# Patient Record
Sex: MALE | Race: WHITE | NOT HISPANIC OR LATINO | Employment: FULL TIME | URBAN - METROPOLITAN AREA
[De-identification: names, ages, dates, MRNs, and addresses within clinical notes are randomized per-mention and may not be internally consistent; named-entity substitution may affect disease eponyms.]

---

## 2017-01-10 ENCOUNTER — SLEEP CENTER VISIT (OUTPATIENT)
Dept: SLEEP MEDICINE | Facility: MEDICAL CENTER | Age: 47
End: 2017-01-10
Payer: COMMERCIAL

## 2017-01-10 VITALS
TEMPERATURE: 97.9 F | RESPIRATION RATE: 16 BRPM | DIASTOLIC BLOOD PRESSURE: 88 MMHG | BODY MASS INDEX: 38.19 KG/M2 | SYSTOLIC BLOOD PRESSURE: 140 MMHG | HEART RATE: 84 BPM | WEIGHT: 252 LBS | HEIGHT: 68 IN

## 2017-01-10 DIAGNOSIS — R53.82 CHRONIC FATIGUE: ICD-10-CM

## 2017-01-10 DIAGNOSIS — I48.0 PAROXYSMAL ATRIAL FIBRILLATION (HCC): ICD-10-CM

## 2017-01-10 DIAGNOSIS — I10 ESSENTIAL HYPERTENSION: ICD-10-CM

## 2017-01-10 DIAGNOSIS — Z79.01 CHRONIC ANTICOAGULATION: ICD-10-CM

## 2017-01-10 DIAGNOSIS — Z95.2 HISTORY OF MECHANICAL AORTIC VALVE REPLACEMENT: ICD-10-CM

## 2017-01-10 DIAGNOSIS — G47.33 OSA ON CPAP: Chronic | ICD-10-CM

## 2017-01-10 DIAGNOSIS — I34.2 NON-RHEUMATIC MITRAL VALVE STENOSIS: ICD-10-CM

## 2017-01-10 DIAGNOSIS — Z95.0 PRESENCE OF PERMANENT CARDIAC PACEMAKER: ICD-10-CM

## 2017-01-10 DIAGNOSIS — Z86.73 HISTORY OF TIA (TRANSIENT ISCHEMIC ATTACK): ICD-10-CM

## 2017-01-10 DIAGNOSIS — E66.9 OBESITY (BMI 35.0-39.9 WITHOUT COMORBIDITY): ICD-10-CM

## 2017-01-10 PROCEDURE — 99204 OFFICE O/P NEW MOD 45 MIN: CPT | Performed by: NURSE PRACTITIONER

## 2017-01-10 NOTE — PATIENT INSTRUCTIONS
1. We will request prior sleep study  2. Titration study ASAP  3. Follow-up after study to review results

## 2017-01-10 NOTE — MR AVS SNAPSHOT
"        Brandon Winters   1/10/2017 3:20 PM   Sleep Center Visit   MRN: [id number]    Department:  [facility]   Dept Phone:  [phone]    Description:  Male : 1970   Provider:  MARIBEL Champion           Reason for Visit     Establish Care TRIP Evaluation       Allergies as of 1/10/2017     Allergen Noted Reactions    Lactose 2016         You were diagnosed with     Chronic anticoagulation   [188394]       History of mechanical aortic valve replacement   [518494]       History of TIA (transient ischemic attack)   [732021]       Essential hypertension   [5053772]       Non-rheumatic mitral valve stenosis   [952886]       Obesity (BMI 35.0-39.9 without comorbidity) (HCC)   [515201]       TRIP on CPAP   [248654]       Paroxysmal atrial fibrillation (HCC)   [887135]       Presence of permanent cardiac pacemaker   [197995]       Chronic fatigue   [010155]         Vital Signs     Blood Pressure Pulse Temperature Respirations Height Weight    140/88 mmHg 84 36.6 °C (97.9 °F) (Temporal) 16 1.727 m (5' 8\") 114.306 kg (252 lb)    Body Mass Index Smoking Status                38.33 kg/m2 Never Smoker           Basic Information     Date Of Birth Sex Race Ethnicity Preferred Language    1970 Male White Non- English      Your appointments     2017  3:45 PM   Established Patient with [facility] EXAM 5   [facility]  (--)    [address] [address]   [phone]            2017  8:00 PM   Sleep Study with SLEEP TECH   [facility] (--)    [address] [id number] [phone]            2017  3:20 PM   Follow UP with MARIBEL Champion   [facility] (--)    [address]  [facility] [address] [phone]            2017  4:20 PM   FOLLOW UP with MARIBEL Isaacs   [facility] [place] [facility] (--)    [address], [address]  [place] [id number] [phone]              Problem List              ICD-10-CM Priority Class Noted - Resolved    Hypertension I10 Medium  Unknown - Present    History of mechanical aortic valve replacement Z95.2 Medium  Unknown - Present    Hyperlipoproteinemia E78.5 Medium  Unknown - Present    Presence of permanent cardiac pacemaker Z95.0 Medium  Unknown - Present    Chronic anticoagulation Z79.01 Medium  Unknown - Present    History of TIA (transient ischemic attack) Z86.73 Medium  Unknown - Present    TRIP on CPAP (Chronic) G47.33 Medium  Unknown - Present    Paroxysmal atrial fibrillation (HCC) I48.0 Medium  9/16/2015 - Present    Shortness of breath R06.02 Medium  9/9/2016 - Present    Generalized edema R60.1 Medium  9/9/2016 - Present    Non-rheumatic mitral valve stenosis I34.2 Medium  10/11/2016 - Present    Dyspnea on exertion R06.09   10/13/2016 - Present    Ventral hernia without obstruction or gangrene K43.9   10/13/2016 - Present    Obesity (BMI 35.0-39.9 without comorbidity) (HCC) E66.9   10/14/2016 - Present    Complications, mechanical, pacemaker, cardiac T82.111A   12/29/2016 - Present    Chronic fatigue R53.82   1/10/2017 - Present      Health Maintenance        Date Due Completion Dates    IMM DTaP/Tdap/Td Vaccine (1 - Tdap) 6/19/1990 ---    IMM INFLUENZA (1) 9/1/2016 ---            Current Immunizations     No immunizations on file.      Below and/or attached are the medications your provider expects you to take. Review all of your home medications and newly ordered medications with your provider and/or pharmacist. Follow medication instructions as directed by your provider and/or pharmacist. Please keep your medication list with you and share with your provider. Update the information when medications are discontinued, doses are changed, or new medications (including over-the-counter products) are added; and carry  medication information at all times in the event of emergency situations     Allergies:  LACTOSE - (reactions not documented)               Medications  Valid as of: January 10, 2017 -  4:01 PM    Generic Name Brand Name Tablet Size Instructions for use    Aspirin (Tablet Delayed Response) ECOTRIN 81 MG Take 1 Tab by mouth every day.        Atorvastatin Calcium (Tab) LIPITOR 20 MG Take 2 Tabs by mouth every day.        Doxycycline Hyclate (Tab) VIBRAMYCIN 100 MG Take 1 Tab by mouth 2 times a day.        Pantoprazole Sodium (Tablet Delayed Response) PROTONIX 40 MG Take 1 Tab by mouth every day.        Warfarin Sodium (Tab) COUMADIN 5 MG Take 1 Tab by mouth every day.        Warfarin Sodium (Tab) COUMADIN 2.5 MG Take 1 Tab by mouth every Monday, Wednesday, and Friday.        Warfarin Sodium (Tab) COUMADIN 5 MG Take 1.5 tablets (7.5 mg) on Mondays and Fridays and 5mg the rest of week or as directed by coumadin clinic        .                 Medicines prescribed today were sent to:     [facility] [address]    [facility] [address]    Phone: [phone] Fax: [phone]    Open 24 Hours?: No      Medication refill instructions:       If your prescription bottle indicates you have medication refills left, it is not necessary to call your provider’s office. Please contact your pharmacy and they will refill your medication.    If your prescription bottle indicates you do not have any refills left, you may request refills at any time through one of the following ways: The online [employer] system (except Urgent Care), by calling your provider’s office, or by asking your pharmacy to contact your provider’s office with a refill request. Medication refills are processed only during regular business hours and may not be available until the next business day. Your provider may request additional information or to have a follow-up visit with you prior to refilling your medication.   *Please Note:  Medication refills are assigned a new Rx number when refilled electronically. Your pharmacy may indicate that no refills were authorized even though a new prescription for the same medication is available at the pharmacy. Please request the medicine by name with the pharmacy before contacting your provider for a refill.        Your To Do List     Future Labs/Procedures Complete By Expires    POLYSOMNOGRAPHY TITRATION  As directed 1/10/2018      Instructions    1. We will request prior sleep study  2. Titration study ASAP  3. Follow-up after study to review results          [employer] Access Code: Activation code not generated  Current [employer] Status: Active

## 2017-01-11 NOTE — PROGRESS NOTES
Chief Complaint   Patient presents with   • Establish Care     TRIP Evaluation          HPI: This patient is a 46 y.o. male, who presents for evaluation and management of known sleep-disordered breathing. They moved here from [place] a couple of years ago. He has not had new mask or supplies in quite some time. Patient is currently using CPAP 10 cm H2O. His former sleep study was approximately 5 years ago through Dr. Zuñiga. We will try to obtain those records. Patient has a significant cardiac history including A. fib, permanent pacemaker, mechanical aortic valve replacement, anticoagulated with Coumadin. He is followed by Dr. Brandon Chawla cardiology. Despite using CPAP the patient complains of occasional resuscitative gasps, morning headaches, restless sleep, significant daytime fatigue. He tells me he will not off easily during quiet movements of the day and naps on his days off. ESS is 18. He is a never smoker. Denies ETOH use. He typically drinks 8 carbonated beverages per day but is trying to cut back. He denies symptoms of narcolepsy or cataplexy. He complains of symptoms of restless leg. He has gained approximately 17 pounds in the past year. He understands the importance of routine exercise and the relation of weight gain to sleep apnea. He is hoping once fatigue improves he will be able to exercise more frequently.    Past Medical History   Diagnosis Date   • Hypertension    • Hyperlipoproteinemia    • Sleep apnea      CPAP   • Anticoagulation monitoring, special range    • Paroxysmal atrial fibrillation (HCC)    • Myocardial infarct (HCC) 11/2006   • Pacemaker      AICD   • Breath shortness    • Snoring    • Stroke (HCC) 2010     TIA- no deficits   • Congestive heart failure (HCC) 2/2007     complication following heart surgery, resolved now       Social History   Substance Use Topics   • Smoking status: Never Smoker    • Smokeless tobacco: Never Used   • Alcohol Use: No      Comment: 2-3 times a  "year       Family History   Problem Relation Age of Onset   • Other Father      Hernia   • Heart Disease Father      Ascending Aortic Aneurysm Repair   • No Known Problems Mother    • No Known Problems Sister    • Cancer Paternal Uncle      Prostate Cancer   • Heart Disease Maternal Grandmother      ?Open Heart Surgery   • Dementia Maternal Grandmother    • Heart Disease Paternal Grandmother      ?Open Heart Surgery (CABG)   • Alzheimer's Disease Paternal Grandmother    • Heart Disease Paternal Grandfather      ?Aortic Aneurysm       Current medications as of today   Current Outpatient Prescriptions   Medication Sig Dispense Refill   • atorvastatin (LIPITOR) 20 MG Tab Take 2 Tabs by mouth every day. 180 Tab 3   • aspirin EC (ECOTRIN) 81 MG Tablet Delayed Response Take 1 Tab by mouth every day. 30 Tab    • warfarin (COUMADIN) 5 MG Tab Take 1 Tab by mouth every day. 100 Tab 3   • warfarin (COUMADIN) 2.5 MG Tab Take 1 Tab by mouth every Monday, Wednesday, and Friday. 100 Tab 3   • pantoprazole (PROTONIX) 40 MG Tablet Delayed Response Take 1 Tab by mouth every day. 90 Tab 3   • doxycycline (VIBRAMYCIN) 100 MG Tab Take 1 Tab by mouth 2 times a day. 10 Tab 0   • warfarin (COUMADIN) 5 MG Tab Take 1.5 tablets (7.5 mg) on Mondays and Fridays and 5mg the rest of week or as directed by coumadin clinic 35 Tab 2     No current facility-administered medications for this visit.       Allergies: Lactose    Blood pressure 140/88, pulse 84, temperature 36.6 °C (97.9 °F), temperature source Temporal, resp. rate 16, height 1.727 m (5' 8\"), weight 114.306 kg (252 lb).      ROS:   Constitutional: Denies fevers, chills, night sweats, weight loss. Positive for fatigue.  HEENT: Denies earache, difficulty hearing, tinnitus,hoarseness. Positive for nasal congestion.  Cardiovascular: Denies chest pain, tightness, palpitations, orthopnea. Positive for bilateral lower extremity edema.  Respiratory: Positive for exertional dyspnea, denies cough, " wheeze, hemoptysis.  Sleep: See HPI  GI: Denies heartburn, dysphagia, nausea, abdominal pain, diarrhea or constipation  : Denies frequent urination, hematuria, discharge or painful urination  Musculoskeletal: Positive for chronic back pain  Neurological: Denies weakness or headaches  Skin: No rashes    Physical exam:   Appearance: Well-nourished, well-developed, in no acute distress  HEENT: Normocephalic, atraumatic, white sclera, PERRLA  Respiratory: no intercostal retractions or accessory muscle use   Lungs auscultation: Clear to auscultation bilaterally  Cardiovascular: Regular rate and rhythm, positive for mechanical valve click  Gait: Normal  Digits: No clubbing, cyanosis  Motor: No focal deficits  Orientation: Oriented to time, person and place    Diagnosis:  1. Chronic anticoagulation     2. History of mechanical aortic valve replacement     3. History of TIA (transient ischemic attack)     4. Essential hypertension     5. Non-rheumatic mitral valve stenosis     6. Obesity (BMI 35.0-39.9 without comorbidity) ([facility])     7. TRIP on CPAP  POLYSOMNOGRAPHY TITRATION   8. Paroxysmal atrial fibrillation ([facility])     9. Presence of permanent cardiac pacemaker     10. Chronic fatigue  POLYSOMNOGRAPHY TITRATION       Plan:    The patient has a known history of sleep apnea currently being treated with CPAP. Patient's compliance download shows 100% compliance, average use of 6-1/2 hours per night, AHI of 2.7. He has not had new supplies in quite some time. We need to locate his initial sleep study in order to obtain new mask and supplies for him. Given his continued symptoms and weight gain, titration study is recommended, not only to verify that sleep apnea is adequately treated but to verify nocturnal saturations.    1. We will try to obtain initial sleep study from prior sleep physician  2. Titration study ASAP  3. Patient will continue CPAP 10 cm H2O in the meantime  4. Follow-up with cardiology as scheduled  5.  Follow-up here after sleep study to review results, patient's machine is approximately 5 years old. May require new machine or maintenance of his current machine.

## 2017-01-12 ENCOUNTER — ANTICOAGULATION VISIT (OUTPATIENT)
Dept: VASCULAR LAB | Facility: MEDICAL CENTER | Age: 47
End: 2017-01-12
Attending: NURSE PRACTITIONER
Payer: COMMERCIAL

## 2017-01-12 DIAGNOSIS — I48.0 PAROXYSMAL ATRIAL FIBRILLATION (HCC): ICD-10-CM

## 2017-01-12 DIAGNOSIS — Z79.01 CHRONIC ANTICOAGULATION: ICD-10-CM

## 2017-01-12 DIAGNOSIS — Z95.2 HISTORY OF MECHANICAL AORTIC VALVE REPLACEMENT: ICD-10-CM

## 2017-01-12 LAB — INR PPP: 3.5 (ref 2–3.5)

## 2017-01-12 PROCEDURE — 99212 OFFICE O/P EST SF 10 MIN: CPT | Performed by: NURSE PRACTITIONER

## 2017-01-12 PROCEDURE — 85610 PROTHROMBIN TIME: CPT

## 2017-01-12 NOTE — MR AVS SNAPSHOT
Brandon Winters   2017 3:45 PM   Anticoagulation Visit   MRN: [id number]    Department:  Vascular Medicine   Dept Phone:  [phone]    Description:  Male : 1970   Provider:  [facility] EXAM 5           Allergies as of 2017     Allergen Noted Reactions    Lactose 2016         You were diagnosed with     Paroxysmal atrial fibrillation (HCC)   [522964]       Chronic anticoagulation   [276832]       History of mechanical aortic valve replacement   [084656]         Vital Signs     Smoking Status                   Never Smoker            Basic Information     Date Of Birth Sex Race Ethnicity Preferred Language    1970 Male White Non- English      Your appointments     2017  3:45 PM   Established Patient with [facility] EXAM 5   [facility]  (--)    [address]  [place] [address]   [phone]            2017  3:45 PM   Established Patient with [facility] EXAM 4   [facility]  (--)    [address]  [place] [address]   [phone]            2017  8:00 PM   Sleep Study with SLEEP TECH   [facility] (--)    [address]  [place] [address] [phone]            2017  3:20 PM   Follow UP with ASHLEY ChampionPSOPHIA   [facility] (--)    [address]  [place] [address] [phone]            2017  4:20 PM   FOLLOW UP with MARIBEL Isaacs   [facility] (--)    [address], [address]  [place] [id number] [phone]              Problem List              ICD-10-CM Priority Class Noted - Resolved    Hypertension I10 Medium  Unknown - Present    History of mechanical aortic valve replacement Z95.2 Medium  Unknown - Present    Hyperlipoproteinemia E78.5 Medium  Unknown - Present    Presence of permanent cardiac pacemaker Z95.0 Medium  Unknown - Present    Chronic anticoagulation Z79.01 Medium  Unknown - Present    History of TIA (transient ischemic attack) Z86.73 Medium  Unknown - Present    TRIP on CPAP (Chronic) G47.33 Medium  Unknown - Present    Paroxysmal atrial fibrillation (HCC) I48.0 Medium  9/16/2015 - Present    Shortness of breath R06.02 Medium  9/9/2016 - Present    Generalized edema R60.1 Medium  9/9/2016 - Present    Non-rheumatic mitral valve stenosis I34.2 Medium  10/11/2016 - Present    Dyspnea on exertion R06.09   10/13/2016 - Present    Ventral hernia without obstruction or gangrene K43.9   10/13/2016 - Present    Obesity (BMI 35.0-39.9 without comorbidity) (HCC) E66.9   10/14/2016 - Present    Complications, mechanical, pacemaker, cardiac T82.111A   12/29/2016 - Present    Chronic fatigue R53.82   1/10/2017 - Present      Health Maintenance        Date Due Completion Dates    IMM DTaP/Tdap/Td Vaccine (1 - Tdap) 6/19/1990 ---    IMM INFLUENZA (1) 9/1/2016 ---            Results     POCT Protime      Component    INR    3.5                        Current Immunizations     No immunizations on file.      Below and/or attached are the medications your provider expects you to take. Review all of your home medications and newly ordered medications with your provider and/or pharmacist. Follow medication instructions as directed by your provider and/or pharmacist. Please keep your medication list with you and share with your provider. Update the information when medications are discontinued, doses are changed, or new medications (including over-the-counter products) are added; and carry medication information at all times in the event of emergency situations     Allergies:  LACTOSE - (reactions not documented)               Medications  Valid as of: January 12, 2017 -  3:44 PM    Generic Name Brand Name Tablet Size Instructions for use    Aspirin (Tablet Delayed Response) ECOTRIN 81  MG Take 1 Tab by mouth every day.        Atorvastatin Calcium (Tab) LIPITOR 20 MG Take 2 Tabs by mouth every day.        Doxycycline Hyclate (Tab) VIBRAMYCIN 100 MG Take 1 Tab by mouth 2 times a day.        Pantoprazole Sodium (Tablet Delayed Response) PROTONIX 40 MG Take 1 Tab by mouth every day.        Warfarin Sodium (Tab) COUMADIN 5 MG Take 1 Tab by mouth every day.        Warfarin Sodium (Tab) COUMADIN 2.5 MG Take 1 Tab by mouth every Monday, Wednesday, and Friday.        Warfarin Sodium (Tab) COUMADIN 5 MG Take 1.5 tablets (7.5 mg) on Mondays and Fridays and 5mg the rest of week or as directed by coumadin clinic        .                 Medicines prescribed today were sent to:     [facility] - [address]    [address] [place] [address]    Phone: [phone] Fax: [phone]    Open 24 Hours?: No      Medication refill instructions:       If your prescription bottle indicates you have medication refills left, it is not necessary to call your provider’s office. Please contact your pharmacy and they will refill your medication.    If your prescription bottle indicates you do not have any refills left, you may request refills at any time through one of the following ways: The online [employer] system (except Urgent Care), by calling your provider’s office, or by asking your pharmacy to contact your provider’s office with a refill request. Medication refills are processed only during regular business hours and may not be available until the next business day. Your provider may request additional information or to have a follow-up visit with you prior to refilling your medication.   *Please Note: Medication refills are assigned a new Rx number when refilled electronically. Your pharmacy may indicate that no refills were authorized even though a new prescription for the same medication is available at the pharmacy. Please request the medicine by name with the pharmacy before contacting your provider  for a refill.        Warfarin Dosing Calendar   January 2017 Details    Sun Mon Tue Wed Thu Fri Sat     1               2               3               4               5               6               7                 8               9               10               11               12   3.5   5 mg   See details      13      5 mg         14      5 mg           15      5 mg         16      7.5 mg         17      5 mg         18      5 mg         19      5 mg         20      5 mg         21      5 mg           22      5 mg         23      7.5 mg         24      5 mg         25      5 mg         26      5 mg         27               28                 29               30               31                    Date Details   01/12 This INR check   INR: 3.5       Date of next INR:  1/26/2017         How to take your warfarin dose     To take:  5 mg Take 1 of the 5 mg tablets.    To take:  7.5 mg Take 1.5 of the 5 mg tablets.              [employer] Access Code: Activation code not generated  Current [employer] Status: Active

## 2017-01-12 NOTE — PROGRESS NOTES
Anticoagulation Summary as of 1/12/2017     INR goal 2.5-3.5   Selected INR 3.5 (1/12/2017)   Maintenance plan 7.5 mg (5 mg x 1.5) on Mon; 5 mg (5 mg x 1) all other days   Weekly total 37.5 mg   Plan last modified ASHLEY BarryPARMANI (1/12/2017)   Next INR check 1/26/2017   Target end date Indefinite    Indications   Paroxysmal atrial fibrillation (HCC) [I48.0]  Chronic anticoagulation [Z79.01]  History of mechanical aortic valve replacement [Z95.2]         Anticoagulation Episode Summary     INR check location [facility]    Preferred lab     Send INR reminders to     Comments [facility]      Anticoagulation Care Providers     Provider Role Specialty Phone number    Brandon Chawla M.D. Referring Cardiology [phone]    [facility] Responsible  [phone]    Rashi Shook, PHARMD Responsible          Anticoagulation Patient Findings    Pt is therapeutic today. Denies any changes in medications or diet. Med rec completed and reviewed. Patient denies any s/sx of bleeding or clotting. Pt would like to try decreasing his dose for a couple weeks as he has been historically running supratherapeutic.  He states he does not eat much Vit K.  I feel this is a reasonable request with close follow up.  Will decrease dosing as outlined in calendar. Will follow-up with pt in 2 weeks.    Sadaf JENSEN

## 2017-01-13 LAB — INR BLD: 3.5 (ref 0.9–1.2)

## 2017-01-18 DIAGNOSIS — K21.9 GASTROESOPHAGEAL REFLUX DISEASE WITHOUT ESOPHAGITIS: ICD-10-CM

## 2017-01-18 RX ORDER — PANTOPRAZOLE SODIUM 40 MG/1
40 TABLET, DELAYED RELEASE ORAL DAILY
Qty: 90 TAB | Refills: 3 | OUTPATIENT
Start: 2017-01-18

## 2017-01-20 ENCOUNTER — NON-PROVIDER VISIT (OUTPATIENT)
Dept: CARDIOLOGY | Facility: MEDICAL CENTER | Age: 47
End: 2017-01-20
Payer: COMMERCIAL

## 2017-01-20 DIAGNOSIS — Z95.0 PRESENCE OF PERMANENT CARDIAC PACEMAKER: ICD-10-CM

## 2017-01-20 PROCEDURE — 93280 PM DEVICE PROGR EVAL DUAL: CPT | Performed by: INTERNAL MEDICINE

## 2017-01-20 NOTE — MR AVS SNAPSHOT
Brandon Winters   2017 3:00 PM   Appointment   MRN: [id number]    Department:  [facility]   Dept Phone:  [phone]    Description:  Male : 1970   Provider:  [facility]           Allergies as of 2017     Allergen Noted Reactions    Lactose 2016         Vital Signs     Smoking Status                   Never Smoker            Basic Information     Date Of Birth Sex Race Ethnicity Preferred Language    1970 Male White Non- English      Your appointments     2017  3:45 PM   Established Patient with [facility] EXAM 4   [facility]  (--)    [address]  [place] [address]   [phone]            2017  8:00 PM   Sleep Study with SLEEP TECH   [facility] Sleep Medicine (--)    [address]  [place] [address] [phone]            2017  3:20 PM   Follow UP with ASHLEY ChampionPSameeraRARMANI   [facility] Sleep Medicine (--)    [address]  [place] [address] [phone]            2017  4:20 PM   FOLLOW UP with ASHLEY IsaacsPSameeraRSameeraNSameera   [facility] (--)    [address], [address]  [place] [address] [phone]              Problem List              ICD-10-CM Priority Class Noted - Resolved    Hypertension I10 Medium  Unknown - Present    History of mechanical aortic valve replacement Z95.2 Medium  Unknown - Present    Hyperlipoproteinemia E78.5 Medium  Unknown - Present    Presence of permanent cardiac pacemaker Z95.0 Medium  Unknown - Present    Chronic anticoagulation Z79.01 Medium  Unknown - Present    History of TIA (transient ischemic attack) Z86.73 Medium  Unknown - Present    TRIP on CPAP (Chronic) G47.33 Medium  Unknown - Present    Paroxysmal atrial fibrillation (CMS-HCC) I48.0 Medium  2015 - Present    Shortness of breath R06.02 Medium  2016 -  Present    Generalized edema R60.1 Medium  9/9/2016 - Present    Non-rheumatic mitral valve stenosis I34.2 Medium  10/11/2016 - Present    Dyspnea on exertion R06.09   10/13/2016 - Present    Ventral hernia without obstruction or gangrene K43.9   10/13/2016 - Present    Obesity (BMI 35.0-39.9 without comorbidity) (HCC) E66.9   10/14/2016 - Present    Complications, mechanical, pacemaker, cardiac T82.111A   12/29/2016 - Present    Chronic fatigue R53.82   1/10/2017 - Present      Health Maintenance        Date Due Completion Dates    IMM DTaP/Tdap/Td Vaccine (1 - Tdap) 6/19/1990 ---    IMM INFLUENZA (1) 9/1/2016 ---            Current Immunizations     No immunizations on file.      Below and/or attached are the medications your provider expects you to take. Review all of your home medications and newly ordered medications with your provider and/or pharmacist. Follow medication instructions as directed by your provider and/or pharmacist. Please keep your medication list with you and share with your provider. Update the information when medications are discontinued, doses are changed, or new medications (including over-the-counter products) are added; and carry medication information at all times in the event of emergency situations     Allergies:  LACTOSE - (reactions not documented)               Medications  Valid as of: January 20, 2017 -  3:33 PM    Generic Name Brand Name Tablet Size Instructions for use    Aspirin (Tablet Delayed Response) ECOTRIN 81 MG Take 1 Tab by mouth every day.        Atorvastatin Calcium (Tab) LIPITOR 20 MG Take 2 Tabs by mouth every day.        Doxycycline Hyclate (Tab) VIBRAMYCIN 100 MG Take 1 Tab by mouth 2 times a day.        Pantoprazole Sodium (Tablet Delayed Response) PROTONIX 40 MG Take 1 Tab by mouth every day.        Warfarin Sodium (Tab) COUMADIN 5 MG Take 1 Tab by mouth every day.        Warfarin Sodium (Tab) COUMADIN 2.5 MG Take 1 Tab by mouth every Monday, Wednesday, and  Friday.        Warfarin Sodium (Tab) COUMADIN 5 MG Take 1.5 tablets (7.5 mg) on Mondays and Fridays and 5mg the rest of week or as directed by coumadin clinic        .                 Medicines prescribed today were sent to:     [facility] [place] [place] - [address]    [address] [place] [address]    Phone: [phone] Fax: [phone]    Open 24 Hours?: No      Medication refill instructions:       If your prescription bottle indicates you have medication refills left, it is not necessary to call your provider’s office. Please contact your pharmacy and they will refill your medication.    If your prescription bottle indicates you do not have any refills left, you may request refills at any time through one of the following ways: The online [employer] system (except Urgent Care), by calling your provider’s office, or by asking your pharmacy to contact your provider’s office with a refill request. Medication refills are processed only during regular business hours and may not be available until the next business day. Your provider may request additional information or to have a follow-up visit with you prior to refilling your medication.   *Please Note: Medication refills are assigned a new Rx number when refilled electronically. Your pharmacy may indicate that no refills were authorized even though a new prescription for the same medication is available at the pharmacy. Please request the medicine by name with the pharmacy before contacting your provider for a refill.           TrackR Access Code: Activation code not generated  Current TrackR Status: Active

## 2017-01-21 NOTE — NON-PROVIDER
S/p pacemaker generator replacement, implanted on 12-. Appropriate device function demonstrated. Wound site appears clear. Advised to watch for redness,swelling, oozing or fever. Pt verbalized understanding. See back in 2-3 weeks along with Dr. Recio to review parameters changes.

## 2017-01-26 ENCOUNTER — ANTICOAGULATION VISIT (OUTPATIENT)
Dept: VASCULAR LAB | Facility: MEDICAL CENTER | Age: 47
End: 2017-01-26
Attending: NURSE PRACTITIONER
Payer: COMMERCIAL

## 2017-01-26 DIAGNOSIS — Z95.2 HISTORY OF MECHANICAL AORTIC VALVE REPLACEMENT: ICD-10-CM

## 2017-01-26 DIAGNOSIS — I48.0 PAROXYSMAL ATRIAL FIBRILLATION (HCC): ICD-10-CM

## 2017-01-26 DIAGNOSIS — Z79.01 CHRONIC ANTICOAGULATION: ICD-10-CM

## 2017-01-26 LAB
INR BLD: 3.9 (ref 0.9–1.2)
INR PPP: 3.9 (ref 2–3.5)

## 2017-01-26 PROCEDURE — 85610 PROTHROMBIN TIME: CPT

## 2017-01-26 PROCEDURE — 99212 OFFICE O/P EST SF 10 MIN: CPT

## 2017-01-26 NOTE — PROGRESS NOTES
OP Anticoagulation Service Note    Date: 1/26/2017    Anticoagulation Summary as of 1/26/2017     INR goal 2.5-3.5   Selected INR 3.9! (1/26/2017)   Maintenance plan 5 mg (5 mg x 1) every day   Weekly total 35 mg   Plan last modified Ginger Nguyen PHARMD (1/26/2017)   Next INR check 2/9/2017   Target end date Indefinite    Indications   Paroxysmal atrial fibrillation (CMS-HCC) [I48.0]  Chronic anticoagulation [Z79.01]  History of mechanical aortic valve replacement [Z95.2]         Anticoagulation Episode Summary     INR check location [facility]    Preferred lab     Send INR reminders to     Comments [facility]      Anticoagulation Care Providers     Provider Role Specialty Phone number    Brandon Chawla M.D. Referring Cardiology [phone]    [facility] Responsible  [phone]    Rashi Shook, PHARMD Responsible          Anticoagulation Patient Findings   Negatives Missed Doses, Extra Doses, Medication Changes, Antibiotic Use, Diet Changes, Dental/Other Procedures, Hospitalization, Bleeding Gums, Nose Bleeds, Blood in Urine, Blood in Stool, Any Bruising, Other Complaints          Plan:  INR is high today. Confirmed dosing regimen. No missed or extra doses taken. Patient denies sign/symptoms of bleeding/clotting. No recent medication changes and patient has been eating a consistent diet. Instructed pt to call clinic with any concerns of bleeding or thrombosis. Will have pt take 2.5 mg tonight ONLY then begin decreased weekly regimen.  Follow up in 2 weeks        Govind Otto D

## 2017-01-26 NOTE — MR AVS SNAPSHOT
Brandon Winters   2017 3:45 PM   Anticoagulation Visit   MRN: [id number]    Department:  Vascular Medicine   Dept Phone:  [phone]    Description:  Male : 1970   Provider:  [facility] EXAM 4           Allergies as of 2017     Allergen Noted Reactions    Lactose 2016         You were diagnosed with     Paroxysmal atrial fibrillation (CMS-HCC)   [453817]       Chronic anticoagulation   [165808]       History of mechanical aortic valve replacement   [484911]         Vital Signs     Smoking Status                   Never Smoker            Basic Information     Date Of Birth Sex Race Ethnicity Preferred Language    1970 Male White Non- English      Your appointments     2017  3:45 PM   PACER CHECK ONLY with [facility]   [facility] (--)    [address]  [place] [address] [phone]            2017  4:00 PM   FOLLOW UP with Marcia Recio M.D.   [facility] (--)    [address]  [place] [address] [phone]            2017  4:30 PM   Established Patient with [facility] EXAM 5   [facility] for [facility] and [facility] Health  (--)    [address]  [place] [address]   [phone]            2017  8:00 PM   Sleep Study with SLEEP TECH   [facility] (--)    [address]  [place] [address] [phone]            2017  3:20 PM   Follow UP with MARIBEL Champion   [facility] (--)    [address]  [place] [address] [phone]            2017  4:20 PM   FOLLOW UP with MARIBEL Isaacs   [facility] (--)    [address]  [place] [address] [phone]              Problem List              ICD-10-CM Priority Class Noted - Resolved    Hypertension I10 Medium  Unknown - Present    History of mechanical aortic valve replacement Z95.2 Medium  Unknown - Present    Hyperlipoproteinemia E78.5 Medium  Unknown - Present    Presence of permanent cardiac pacemaker Z95.0 Medium  Unknown - Present    Chronic anticoagulation Z79.01 Medium  Unknown - Present    History of TIA (transient ischemic attack) Z86.73 Medium  Unknown - Present    TRIP on CPAP (Chronic) G47.33 Medium  Unknown - Present    Paroxysmal atrial fibrillation (CMS-[facility]) I48.0 Medium  9/16/2015 - Present    Shortness of breath R06.02 Medium  9/9/2016 - Present    Generalized edema R60.1 Medium  9/9/2016 - Present    Non-rheumatic mitral valve stenosis I34.2 Medium  10/11/2016 - Present    Dyspnea on exertion R06.09   10/13/2016 - Present    Ventral hernia without obstruction or gangrene K43.9   10/13/2016 - Present    Obesity (BMI 35.0-39.9 without comorbidity) ([facility]) E66.9   10/14/2016 - Present    Complications, mechanical, pacemaker, cardiac T82.111A   12/29/2016 - Present    Chronic fatigue R53.82   1/10/2017 - Present      Health Maintenance        Date Due Completion Dates    IMM DTaP/Tdap/Td Vaccine (1 - Tdap) 6/19/1990 ---    IMM INFLUENZA (1) 9/1/2016 ---            Results     POCT Protime      Component    INR    3.9                        Current Immunizations     No immunizations on file.      Below and/or attached are the medications your provider expects you to take. Review all of your home medications and newly ordered medications with your provider and/or pharmacist. Follow medication instructions as directed by your provider and/or pharmacist. Please keep your medication list with you and share with your provider. Update the information when medications are discontinued, doses are changed, or new medications (including over-the-counter products) are added; and carry medication information at all times in the event of emergency situations     Allergies:  LACTOSE - (reactions not  documented)               Medications  Valid as of: January 26, 2017 -  3:48 PM    Generic Name Brand Name Tablet Size Instructions for use    Aspirin (Tablet Delayed Response) ECOTRIN 81 MG Take 1 Tab by mouth every day.        Atorvastatin Calcium (Tab) LIPITOR 20 MG Take 2 Tabs by mouth every day.        Doxycycline Hyclate (Tab) VIBRAMYCIN 100 MG Take 1 Tab by mouth 2 times a day.        Pantoprazole Sodium (Tablet Delayed Response) PROTONIX 40 MG Take 1 Tab by mouth every day.        Warfarin Sodium (Tab) COUMADIN 5 MG Take 1 Tab by mouth every day.        Warfarin Sodium (Tab) COUMADIN 2.5 MG Take 1 Tab by mouth every Monday, Wednesday, and Friday.        Warfarin Sodium (Tab) COUMADIN 5 MG Take 1.5 tablets (7.5 mg) on Mondays and Fridays and 5mg the rest of week or as directed by coumadin clinic        .                 Medicines prescribed today were sent to:     [facility] [place] [place] - [address]    [address] [place] [address]    Phone: [phone] Fax: [phone]    Open 24 Hours?: No      Medication refill instructions:       If your prescription bottle indicates you have medication refills left, it is not necessary to call your provider’s office. Please contact your pharmacy and they will refill your medication.    If your prescription bottle indicates you do not have any refills left, you may request refills at any time through one of the following ways: The online [employer] system (except Urgent Care), by calling your provider’s office, or by asking your pharmacy to contact your provider’s office with a refill request. Medication refills are processed only during regular business hours and may not be available until the next business day. Your provider may request additional information or to have a follow-up visit with you prior to refilling your medication.   *Please Note: Medication refills are assigned a new Rx number when refilled electronically. Your pharmacy may indicate that no  refills were authorized even though a new prescription for the same medication is available at the pharmacy. Please request the medicine by name with the pharmacy before contacting your provider for a refill.        Warfarin Dosing Calendar   January 2017 Details    Sun Mon Tue Wed Thu Fri Sat     1               2               3               4               5               6               7                 8               9               10               11               12               13               14                 15               16               17               18               19               20               21                 22               23               24               25               26   3.9   2.5 mg   See details      27      5 mg         28      5 mg           29      5 mg         30      5 mg         31      5 mg              Date Details   01/26 This INR check   INR: 3.9               How to take your warfarin dose     To take:  2.5 mg Take 0.5 of a 5 mg tablet.    To take:  5 mg Take 1 of the 5 mg tablets.           Warfarin Dosing Calendar   February 2017 Details    Sun Mon Tue Wed Thu Fri Sat        1      5 mg         2      5 mg         3      5 mg         4      5 mg           5      5 mg         6      5 mg         7      5 mg         8      5 mg         9      5 mg         10               11                 12               13               14               15               16               17               18                 19               20               21               22               23               24               25                 26               27               28                    Date Details   No additional details    Date of next INR:  2/9/2017         How to take your warfarin dose     To take:  5 mg Take 1 of the 5 mg tablets.              [employer] Access Code: Activation code not generated  Current [employer] Status: Active

## 2017-02-03 ENCOUNTER — NON-PROVIDER VISIT (OUTPATIENT)
Dept: CARDIOLOGY | Facility: MEDICAL CENTER | Age: 47
End: 2017-02-03
Payer: COMMERCIAL

## 2017-02-03 ENCOUNTER — OFFICE VISIT (OUTPATIENT)
Dept: CARDIOLOGY | Facility: MEDICAL CENTER | Age: 47
End: 2017-02-03
Payer: COMMERCIAL

## 2017-02-03 VITALS
BODY MASS INDEX: 39.39 KG/M2 | HEIGHT: 67 IN | SYSTOLIC BLOOD PRESSURE: 122 MMHG | HEART RATE: 65 BPM | DIASTOLIC BLOOD PRESSURE: 84 MMHG | OXYGEN SATURATION: 95 % | WEIGHT: 251 LBS

## 2017-02-03 DIAGNOSIS — Z95.2 HISTORY OF MECHANICAL AORTIC VALVE REPLACEMENT: ICD-10-CM

## 2017-02-03 DIAGNOSIS — Z95.0 CARDIAC PACEMAKER IN SITU: ICD-10-CM

## 2017-02-03 DIAGNOSIS — Z95.0 PRESENCE OF PERMANENT CARDIAC PACEMAKER: ICD-10-CM

## 2017-02-03 DIAGNOSIS — I44.2 COMPLETE HEART BLOCK (HCC): ICD-10-CM

## 2017-02-03 PROCEDURE — 99024 POSTOP FOLLOW-UP VISIT: CPT | Mod: 25 | Performed by: INTERNAL MEDICINE

## 2017-02-03 PROCEDURE — 93280 PM DEVICE PROGR EVAL DUAL: CPT | Performed by: INTERNAL MEDICINE

## 2017-02-03 NOTE — MR AVS SNAPSHOT
Brandon Winters   2/3/2017 3:45 PM   Appointment   MRN: [id number]    Department:  [facility]   Dept Phone:  [phone]    Description:  Male : 1970   Provider:  [facility]           Allergies as of 2/3/2017     Allergen Noted Reactions    Lactose 2016         Vital Signs     Smoking Status                   Never Smoker            Basic Information     Date Of Birth Sex Race Ethnicity Preferred Language    1970 Male White Non- English      Your appointments     2017  4:30 PM   Established Patient with [facility] EXAM 5   [facility] for Heart and Vascular Health  (--)    [address]  [place] [address]   [phone]            2017  8:00 PM   Sleep Study with SLEEP TECH   [facility] Sleep Medicine (--)    [address]  [place] [address] [phone]            2017  3:20 PM   Follow UP with ASHLEY ChampionPSameeraRARMANI   [facility] Sleep [facility] (--)    [address]  [place] [address] [phone]            2017  4:20 PM   FOLLOW UP with Padmini Roche, ASHLEYPSameeraRSameeraNSameera   [facility] for Heart and Vascular Health[facility] (--)    [address]  [place] [address] [phone]              Problem List              ICD-10-CM Priority Class Noted - Resolved    Hypertension I10 Medium  Unknown - Present    History of mechanical aortic valve replacement Z95.2 Medium  Unknown - Present    Hyperlipoproteinemia E78.5 Medium  Unknown - Present    Presence of permanent cardiac pacemaker Z95.0 Medium  Unknown - Present    Chronic anticoagulation Z79.01 Medium  Unknown - Present    History of TIA (transient ischemic attack) Z86.73 Medium  Unknown - Present    TRIP on CPAP (Chronic) G47.33 Medium  Unknown - Present    Paroxysmal atrial fibrillation (CMS-HCC) I48.0 Medium  2015 - Present    Shortness of breath R06.02 Medium  2016 -  Present    Generalized edema R60.1 Medium  9/9/2016 - Present    Non-rheumatic mitral valve stenosis I34.2 Medium  10/11/2016 - Present    Dyspnea on exertion R06.09   10/13/2016 - Present    Ventral hernia without obstruction or gangrene K43.9   10/13/2016 - Present    Obesity (BMI 35.0-39.9 without comorbidity) (HCC) E66.9   10/14/2016 - Present    Complications, mechanical, pacemaker, cardiac T82.111A   12/29/2016 - Present    Chronic fatigue R53.82   1/10/2017 - Present      Health Maintenance        Date Due Completion Dates    IMM DTaP/Tdap/Td Vaccine (1 - Tdap) 6/19/1990 ---    IMM INFLUENZA (1) 9/1/2016 ---            Current Immunizations     No immunizations on file.      Below and/or attached are the medications your provider expects you to take. Review all of your home medications and newly ordered medications with your provider and/or pharmacist. Follow medication instructions as directed by your provider and/or pharmacist. Please keep your medication list with you and share with your provider. Update the information when medications are discontinued, doses are changed, or new medications (including over-the-counter products) are added; and carry medication information at all times in the event of emergency situations     Allergies:  LACTOSE - (reactions not documented)               Medications  Valid as of: February 03, 2017 -  4:09 PM    Generic Name Brand Name Tablet Size Instructions for use    Aspirin (Tablet Delayed Response) ECOTRIN 81 MG Take 1 Tab by mouth every day.        Atorvastatin Calcium (Tab) LIPITOR 20 MG Take 2 Tabs by mouth every day.        Doxycycline Hyclate (Tab) VIBRAMYCIN 100 MG Take 1 Tab by mouth 2 times a day.        Pantoprazole Sodium (Tablet Delayed Response) PROTONIX 40 MG Take 1 Tab by mouth every day.        Warfarin Sodium (Tab) COUMADIN 5 MG Take 1 Tab by mouth every day.        Warfarin Sodium (Tab) COUMADIN 2.5 MG Take 1 Tab by mouth every Monday, Wednesday, and  Friday.        Warfarin Sodium (Tab) COUMADIN 5 MG Take 1.5 tablets (7.5 mg) on Mondays and Fridays and 5mg the rest of week or as directed by coumadin clinic        .                 Medicines prescribed today were sent to:     [facility] [place] [place] - [address]    [address] [place] [address]    Phone: [phone] Fax: [phone]    Open 24 Hours?: No      Medication refill instructions:       If your prescription bottle indicates you have medication refills left, it is not necessary to call your provider’s office. Please contact your pharmacy and they will refill your medication.    If your prescription bottle indicates you do not have any refills left, you may request refills at any time through one of the following ways: The online [employer] system (except Urgent Care), by calling your provider’s office, or by asking your pharmacy to contact your provider’s office with a refill request. Medication refills are processed only during regular business hours and may not be available until the next business day. Your provider may request additional information or to have a follow-up visit with you prior to refilling your medication.   *Please Note: Medication refills are assigned a new Rx number when refilled electronically. Your pharmacy may indicate that no refills were authorized even though a new prescription for the same medication is available at the pharmacy. Please request the medicine by name with the pharmacy before contacting your provider for a refill.           [employer] Access Code: Activation code not generated  Current [employer] Status: Active

## 2017-02-03 NOTE — PROGRESS NOTES
"Arrhythmia Clinic Note (Established Patient)    Date of service:    Reason for visit/Chief complaint:    HPI:   Patient  Is a 47 yo male with history of mechanical AVR in 2007 (w/ MAZE), HTN, TIA, TRIP on CPAP, pAF, who is s/p PPM at the time of mechanical AVR. I did a generator change on him a couple of weeks ago. There was a question of him being 80% V paced before and now after gen change 100%. He is back for device interrogation and perhaps change in device parameters to avoid ventricular pacing.    ROS: Negative for orthopnea, PND, palpitations, chest pain    Physical Exam:  Filed Vitals:    02/03/17 1542   BP: 122/84   Pulse: 65   Height: 1.704 m (5' 7.09\")   Weight: 113.853 kg (251 lb)   SpO2: 95%     Gen: NAD, conversant  HEENT: PERRL, EOMI  LUNGS: CTA B, no w/r/r  CV: RRR, no m/r/g, no JVD  Abd: Soft, NT/ND, +BS  Ext: no edema, warm and well perfused    Labs reviewed    Device interrogation reviewed by me: stable lead parameters, no av conduction, 100% V paced    Impression/Recs:  1. History of mechanical aortic valve replacement     2. Complete heart block (CMS-HCC)     3. Cardiac pacemaker in situ       -He has no underlying conduction with VIP on and AV delay stretched out to 350 msec  -I think he will be completely PPM dependent  -Otherwise wound looks fine  -F/u as scheduled with device clinic    Marcia Recio MD    "

## 2017-02-03 NOTE — MR AVS SNAPSHOT
"Brandon Winters   2/3/2017 4:00 PM   Office Visit   MRN: [id number]    Department:  [facility]   Dept Phone:  [phone]    Description:  Male : 1970   Provider:  Marcia Recio M.D.           Reason for Visit     Follow-Up           Allergies as of 2/3/2017     Allergen Noted Reactions    Lactose 2016         You were diagnosed with     History of mechanical aortic valve replacement   [714289]       Complete heart block (CMS-HCC)   [666884]       Cardiac pacemaker in situ   [V45.01.ICD-9-CM]         Vital Signs     Blood Pressure Pulse Height Weight Body Mass Index Oxygen Saturation    122/84 mmHg 65 1.704 m (5' 7.09\") 113.853 kg (251 lb) 39.21 kg/m2 95%    Smoking Status                   Never Smoker            Basic Information     Date Of Birth Sex Race Ethnicity Preferred Language    1970 Male White Non- English      Your appointments     2017  4:30 PM   Established Patient with [facility] EXAM 5   [facility]  (--)    [address]  [place] [address]   [phone]            2017  8:00 PM   Sleep Study with SLEEP TECH   [facility] (--)    [address]  [place] [address] [phone]            2017  3:20 PM   Follow UP with MIRANDA ChampionRARMANI   [facility] (--)    [address]  [place] [address] [phone]            2017  4:20 PM   FOLLOW UP with MIRANDA IsaacsRARMANI   [facility] (--)    [address], [address]  [place] [address] [phone]              Problem List              ICD-10-CM Priority Class Noted - Resolved    Hypertension I10 Medium  Unknown - Present    History of mechanical aortic valve replacement Z95.2 Medium  Unknown - Present    Hyperlipoproteinemia E78.5 Medium  Unknown - Present    Presence of permanent " cardiac pacemaker Z95.0 Medium  Unknown - Present    Chronic anticoagulation Z79.01 Medium  Unknown - Present    History of TIA (transient ischemic attack) Z86.73 Medium  Unknown - Present    TRIP on CPAP (Chronic) G47.33 Medium  Unknown - Present    Paroxysmal atrial fibrillation (CMS-[facility]) I48.0 Medium  9/16/2015 - Present    Shortness of breath R06.02 Medium  9/9/2016 - Present    Generalized edema R60.1 Medium  9/9/2016 - Present    Non-rheumatic mitral valve stenosis I34.2 Medium  10/11/2016 - Present    Dyspnea on exertion R06.09   10/13/2016 - Present    Ventral hernia without obstruction or gangrene K43.9   10/13/2016 - Present    Obesity (BMI 35.0-39.9 without comorbidity) ([facility]) E66.9   10/14/2016 - Present    Complications, mechanical, pacemaker, cardiac T82.111A   12/29/2016 - Present    Chronic fatigue R53.82   1/10/2017 - Present      Health Maintenance        Date Due Completion Dates    IMM DTaP/Tdap/Td Vaccine (1 - Tdap) 6/19/1990 ---    IMM INFLUENZA (1) 9/1/2016 ---            Current Immunizations     No immunizations on file.      Below and/or attached are the medications your provider expects you to take. Review all of your home medications and newly ordered medications with your provider and/or pharmacist. Follow medication instructions as directed by your provider and/or pharmacist. Please keep your medication list with you and share with your provider. Update the information when medications are discontinued, doses are changed, or new medications (including over-the-counter products) are added; and carry medication information at all times in the event of emergency situations     Allergies:  LACTOSE - (reactions not documented)               Medications  Valid as of: February 03, 2017 -  4:09 PM    Generic Name Brand Name Tablet Size Instructions for use    Aspirin (Tablet Delayed Response) ECOTRIN 81 MG Take 1 Tab by mouth every day.        Atorvastatin Calcium (Tab) LIPITOR 20 MG Take 2 Tabs  by mouth every day.        Doxycycline Hyclate (Tab) VIBRAMYCIN 100 MG Take 1 Tab by mouth 2 times a day.        Pantoprazole Sodium (Tablet Delayed Response) PROTONIX 40 MG Take 1 Tab by mouth every day.        Warfarin Sodium (Tab) COUMADIN 5 MG Take 1 Tab by mouth every day.        Warfarin Sodium (Tab) COUMADIN 2.5 MG Take 1 Tab by mouth every Monday, Wednesday, and Friday.        Warfarin Sodium (Tab) COUMADIN 5 MG Take 1.5 tablets (7.5 mg) on Mondays and Fridays and 5mg the rest of week or as directed by coumadin clinic        .                 Medicines prescribed today were sent to:     [facility] [place] [place] - [address]    [address] [place] [address]    Phone: [phone] Fax: [phone]    Open 24 Hours?: No      Medication refill instructions:       If your prescription bottle indicates you have medication refills left, it is not necessary to call your provider’s office. Please contact your pharmacy and they will refill your medication.    If your prescription bottle indicates you do not have any refills left, you may request refills at any time through one of the following ways: The online [employer] system (except Urgent Care), by calling your provider’s office, or by asking your pharmacy to contact your provider’s office with a refill request. Medication refills are processed only during regular business hours and may not be available until the next business day. Your provider may request additional information or to have a follow-up visit with you prior to refilling your medication.   *Please Note: Medication refills are assigned a new Rx number when refilled electronically. Your pharmacy may indicate that no refills were authorized even though a new prescription for the same medication is available at the pharmacy. Please request the medicine by name with the pharmacy before contacting your provider for a refill.           [employer] Access Code: Activation code not generated  Current [employer]  Status: Active

## 2017-02-06 DIAGNOSIS — Z79.01 CHRONIC ANTICOAGULATION: ICD-10-CM

## 2017-02-06 RX ORDER — WARFARIN SODIUM 2.5 MG/1
TABLET ORAL
Qty: 100 TAB | Refills: 2 | Status: SHIPPED | OUTPATIENT
Start: 2017-02-06 | End: 2017-06-01

## 2017-02-09 ENCOUNTER — ANTICOAGULATION VISIT (OUTPATIENT)
Dept: VASCULAR LAB | Facility: MEDICAL CENTER | Age: 47
End: 2017-02-09
Attending: NURSE PRACTITIONER
Payer: COMMERCIAL

## 2017-02-09 DIAGNOSIS — Z95.2 HISTORY OF MECHANICAL AORTIC VALVE REPLACEMENT: ICD-10-CM

## 2017-02-09 DIAGNOSIS — I48.0 PAROXYSMAL ATRIAL FIBRILLATION (HCC): ICD-10-CM

## 2017-02-09 DIAGNOSIS — Z79.01 CHRONIC ANTICOAGULATION: ICD-10-CM

## 2017-02-09 LAB — INR PPP: 2.9 (ref 2–3.5)

## 2017-02-09 PROCEDURE — 99211 OFF/OP EST MAY X REQ PHY/QHP: CPT

## 2017-02-09 PROCEDURE — 85610 PROTHROMBIN TIME: CPT

## 2017-02-09 NOTE — MR AVS SNAPSHOT
Brandon Winters   2017 4:30 PM   Anticoagulation Visit   MRN: [id number]    Department:  Vascular Medicine   Dept Phone:  [phone]    Description:  Male : 1970   Provider:  [facility] EXAM 5           Allergies as of 2017     Allergen Noted Reactions    Lactose 2016         You were diagnosed with     Paroxysmal atrial fibrillation (CMS-HCC)   [647083]       Chronic anticoagulation   [188151]       History of mechanical aortic valve replacement   [692178]         Vital Signs     Smoking Status                   Never Smoker            Basic Information     Date Of Birth Sex Race Ethnicity Preferred Language    1970 Male White Non- English      Your appointments     2017  8:00 PM   Sleep Study with SLEEP TECH   [facility] (--)    [address]  [place] [address] [phone]            2017  3:20 PM   Follow UP with MARIBEL Champion   [facility] (--)    [address]  [place] [address] [phone]            Mar 09, 2017  4:00 PM   Established Patient with [facility] EXAM 5   [facility]  (--)    [address]  [place] [address]   [phone]            2017  4:20 PM   FOLLOW UP with MARIBEL Isaacs   [facility] (--)    [address], [address]  [place] [address] [phone]            May 03, 2017  4:00 PM   PACER CHECK ONLY with PACER CHECK-[facility]   [facility] (--)    [address], [address]  [place] [address] [phone]              Problem List              ICD-10-CM Priority Class Noted - Resolved    Hypertension I10 Medium  Unknown - Present    History of mechanical aortic valve replacement Z95.2 Medium  Unknown - Present    Hyperlipoproteinemia E78.5 Medium  Unknown - Present    Presence  of permanent cardiac pacemaker Z95.0 Medium  Unknown - Present    Chronic anticoagulation Z79.01 Medium  Unknown - Present    History of TIA (transient ischemic attack) Z86.73 Medium  Unknown - Present    TRIP on CPAP (Chronic) G47.33 Medium  Unknown - Present    Paroxysmal atrial fibrillation (CMS-[facility]) I48.0 Medium  9/16/2015 - Present    Shortness of breath R06.02 Medium  9/9/2016 - Present    Generalized edema R60.1 Medium  9/9/2016 - Present    Non-rheumatic mitral valve stenosis I34.2 Medium  10/11/2016 - Present    Dyspnea on exertion R06.09   10/13/2016 - Present    Ventral hernia without obstruction or gangrene K43.9   10/13/2016 - Present    Obesity (BMI 35.0-39.9 without comorbidity) ([facility]) E66.9   10/14/2016 - Present    Complications, mechanical, pacemaker, cardiac T82.111A   12/29/2016 - Present    Chronic fatigue R53.82   1/10/2017 - Present      Health Maintenance        Date Due Completion Dates    IMM DTaP/Tdap/Td Vaccine (1 - Tdap) 6/19/1990 ---    IMM INFLUENZA (1) 9/1/2016 ---            Results     POCT Protime      Component    INR    2.9                        Current Immunizations     No immunizations on file.      Below and/or attached are the medications your provider expects you to take. Review all of your home medications and newly ordered medications with your provider and/or pharmacist. Follow medication instructions as directed by your provider and/or pharmacist. Please keep your medication list with you and share with your provider. Update the information when medications are discontinued, doses are changed, or new medications (including over-the-counter products) are added; and carry medication information at all times in the event of emergency situations     Allergies:  LACTOSE - (reactions not documented)               Medications  Valid as of: February 09, 2017 -  4:40 PM    Generic Name Brand Name Tablet Size Instructions for use    Aspirin (Tablet Delayed Response) ECOTRIN 81 MG  Take 1 Tab by mouth every day.        Atorvastatin Calcium (Tab) LIPITOR 20 MG Take 2 Tabs by mouth every day.        Doxycycline Hyclate (Tab) VIBRAMYCIN 100 MG Take 1 Tab by mouth 2 times a day.        Pantoprazole Sodium (Tablet Delayed Response) PROTONIX 40 MG Take 1 Tab by mouth every day.        Warfarin Sodium (Tab) COUMADIN 5 MG Take 1 Tab by mouth every day.        Warfarin Sodium (Tab) COUMADIN 5 MG Take 1.5 tablets (7.5 mg) on Mondays and Fridays and 5mg the rest of week or as directed by coumadin clinic        Warfarin Sodium (Tab) COUMADIN 2.5 MG Take one tablet by mouth every monday, wednesday, and friday.        .                 Medicines prescribed today were sent to:     [facility] [place] - [address]    [address] [place] [address]    Phone: [phone] Fax: [phone]    Open 24 Hours?: No      Medication refill instructions:       If your prescription bottle indicates you have medication refills left, it is not necessary to call your provider’s office. Please contact your pharmacy and they will refill your medication.    If your prescription bottle indicates you do not have any refills left, you may request refills at any time through one of the following ways: The online [employer] system (except Urgent Care), by calling your provider’s office, or by asking your pharmacy to contact your provider’s office with a refill request. Medication refills are processed only during regular business hours and may not be available until the next business day. Your provider may request additional information or to have a follow-up visit with you prior to refilling your medication.   *Please Note: Medication refills are assigned a new Rx number when refilled electronically. Your pharmacy may indicate that no refills were authorized even though a new prescription for the same medication is available at the pharmacy. Please request the medicine by name with the pharmacy before contacting your  provider for a refill.        Warfarin Dosing Calendar   February 2017 Details    Sun Mon Tue Wed Thu Fri Sat        1               2               3               4                 5               6               7               8               9   2.9   5 mg   See details      10      5 mg         11      5 mg           12      5 mg         13      5 mg         14      5 mg         15      5 mg         16      5 mg         17      5 mg         18      5 mg           19      5 mg         20      5 mg         21      5 mg         22      5 mg         23      5 mg         24      5 mg         25      5 mg           26      5 mg         27      5 mg         28      5 mg              Date Details   02/09 This INR check   INR: 2.9               How to take your warfarin dose     To take:  5 mg Take 1 of the 5 mg tablets.           Warfarin Dosing Calendar March 2017 Details    Sun Mon Tue Wed Thu Fri Sat        1      5 mg         2      5 mg         3      5 mg         4      5 mg           5      5 mg         6      5 mg         7      5 mg         8      5 mg         9      5 mg         10               11                 12               13               14               15               16               17               18                 19               20               21               22               23               24               25                 26               27               28               29               30               31                 Date Details   No additional details    Date of next INR:  3/9/2017         How to take your warfarin dose     To take:  5 mg Take 1 of the 5 mg tablets.              [employer] Access Code: Activation code not generated  Current [employer] Status: Active

## 2017-02-10 NOTE — PROGRESS NOTES
Anticoagulation Summary as of 2/9/2017     INR goal 2.5-3.5   Selected INR 2.9 (2/9/2017)   Maintenance plan 5 mg (5 mg x 1) every day   Weekly total 35 mg   Plan last modified Ginger Nguyen, PHARMD (1/26/2017)   Next INR check 3/9/2017   Target end date Indefinite    Indications   Paroxysmal atrial fibrillation (CMS-HCC) [I48.0]  Chronic anticoagulation [Z79.01]  History of mechanical aortic valve replacement [Z95.2]         Anticoagulation Episode Summary     INR check location [facility]    Preferred lab     Send INR reminders to     Comments [facility]      Anticoagulation Care Providers     Provider Role Specialty Phone number    Brandon Chawla M.D. Referring Cardiology [phone]    [facility] Responsible  [phone]    Rashi Shook, PHARMD Responsible          Anticoagulation Patient Findings   Negatives Missed Doses, Extra Doses, Medication Changes, Antibiotic Use, Diet Changes, Dental/Other Procedures, Hospitalization, Bleeding Gums, Nose Bleeds, Blood in Urine, Blood in Stool, Any Bruising, Other Complaints          Brandon Winters seen in clinic today  INR  therapeutic.    Denies signs/symptoms of bleeding and/or thrombosis.    Denies changes to diet or medications.   Follow up appointment in 4 week(s).      Continue weekly warfarin dose as noted    Figueroa Mata, PHARMD

## 2017-02-11 ENCOUNTER — SLEEP STUDY (OUTPATIENT)
Dept: SLEEP MEDICINE | Facility: MEDICAL CENTER | Age: 47
End: 2017-02-11
Attending: NURSE PRACTITIONER
Payer: COMMERCIAL

## 2017-02-11 DIAGNOSIS — R53.82 CHRONIC FATIGUE: ICD-10-CM

## 2017-02-11 DIAGNOSIS — G47.33 OSA ON CPAP: Chronic | ICD-10-CM

## 2017-02-11 PROCEDURE — 95811 POLYSOM 6/>YRS CPAP 4/> PARM: CPT | Performed by: INTERNAL MEDICINE

## 2017-02-14 LAB — INR BLD: 2.9 (ref 0.9–1.2)

## 2017-02-14 NOTE — PROCEDURES
CLINICAL COMMENTS:  The patient underwent a split night polysomnogram with a CPAP titration using the standard montage for measurement of parameters of sleep, respiratory events, movement abnormalities, heart rate and rhythm. A microphone was used to monitor snoring.    INTERPRETATION: The diagnostic recording time was 190.6 minutes with a sleep period of 183.9 minutes.  Total sleep time was 140.4 minutes with a sleep efficiency of 73.6%.  The sleep latency was 6.7 minutes, and REM latency was N/A minutes.  The patient had 222 arousals in total, for an arousal index of 94.9.        RESPIRATORY: The patient had 264 apneas in total.  Of these, 9 were obstructive apneas, and 255 were central apneas.  This resulted in an apnea index (AI) of 112.8.  The patient had 29 hypopneas in total, which resulted in a hypopnea index of 12.4.  The overall AHI was 125.2, while the AHI during REM was N/A.  The supine AHI = 123.2.    OXIMETRY: Oxygen saturation monitoring showed a mean SpO2 of 90.4% for the diagnostic part of the study, with a minimum oxygen saturation of 72.0%.  Oxygen saturations were below 89% for 44.9% of sleep time.    CARDIAC: The highest heart rate for the first part of the study was 131.0 beats per minute.  The average heart rate during sleep was 60.0 bpm, while the highest heart rate was 64.0 bpm.    LIMB MOVEMENTS: There were a total of 32 periodic limb movements during sleep, of which 2 were PLMS arousals.  This resulted in a PLMS index of 13.7 and a PLMS arousal index of 0.9.    TREATMENT    Treatment recording time was 311.5 minutes with a total sleep time of 279.0min.  The patient had an arousal index of 7.1.      RESPIRATORY: The patient had 1 obstructive apneas, 13 central apneas, and 38 hypopneas for an overall AHI was 11.2.    OXIMETRY: The mean SpO2 during treatment was 93.2%, with a minimum oxygen saturation of 83.0%.      CPAP was tried from 9 to [id number].    Impression:    During the diagnostic  phase the patient experienced severe central apnea with an AHI of 125.2, a mean event duration of 16.2 seconds, and the longest event lasted 24.4 seconds. The patient did not experience any REM. The lowest saturation during sleep was 72% and saturations were less than 90% for 40% of the diagnostic recording.    Once the patient met protocol for split-night study, the technician performed a positive airway pressure titration. CPAP was initiated at 9 cm and increased to 14 cm in an attempt to control respiratory events and desaturations. In absolute terms patient did well on CPAP from 10-14 cm although respiratory events did not normalize. During the treatment phase, central events comprised 25% of the 52 respiratory events.  For example on CPAP at 13 cm the patient's apnea hypopnea index was 8.5, the minimum saturation 88.0, and the mean saturation 93.8%.    Recommendation:    Recommended a trial of auto titrating CPAP 10-15 cm using a mask of choice and heated humidification followed by clinical and compliance card review in 8 weeks. If the patient fares poorly, recommend an ASV titration.

## 2017-03-08 ENCOUNTER — SLEEP CENTER VISIT (OUTPATIENT)
Dept: SLEEP MEDICINE | Facility: MEDICAL CENTER | Age: 47
End: 2017-03-08
Payer: COMMERCIAL

## 2017-03-08 VITALS
HEART RATE: 90 BPM | BODY MASS INDEX: 38.04 KG/M2 | SYSTOLIC BLOOD PRESSURE: 116 MMHG | HEIGHT: 68 IN | RESPIRATION RATE: 14 BRPM | DIASTOLIC BLOOD PRESSURE: 84 MMHG | WEIGHT: 251 LBS

## 2017-03-08 DIAGNOSIS — I10 ESSENTIAL HYPERTENSION: ICD-10-CM

## 2017-03-08 DIAGNOSIS — E66.9 OBESITY (BMI 35.0-39.9 WITHOUT COMORBIDITY): ICD-10-CM

## 2017-03-08 DIAGNOSIS — Z95.0 PRESENCE OF PERMANENT CARDIAC PACEMAKER: ICD-10-CM

## 2017-03-08 DIAGNOSIS — Z95.2 HISTORY OF MECHANICAL AORTIC VALVE REPLACEMENT: ICD-10-CM

## 2017-03-08 DIAGNOSIS — Z86.73 HISTORY OF TIA (TRANSIENT ISCHEMIC ATTACK): ICD-10-CM

## 2017-03-08 DIAGNOSIS — G47.33 OSA ON CPAP: Chronic | ICD-10-CM

## 2017-03-08 DIAGNOSIS — R53.82 CHRONIC FATIGUE: ICD-10-CM

## 2017-03-08 DIAGNOSIS — Z79.01 CHRONIC ANTICOAGULATION: ICD-10-CM

## 2017-03-08 DIAGNOSIS — G47.31 CENTRAL SLEEP APNEA: ICD-10-CM

## 2017-03-08 DIAGNOSIS — I48.0 PAROXYSMAL ATRIAL FIBRILLATION (HCC): ICD-10-CM

## 2017-03-08 PROCEDURE — 99213 OFFICE O/P EST LOW 20 MIN: CPT | Performed by: NURSE PRACTITIONER

## 2017-03-08 NOTE — MR AVS SNAPSHOT
"        Brandon Winters   3/8/2017 2:00 PM   Sleep Center Visit   MRN: [id number]    Department:  [facility]   Dept Phone:  [phone]    Description:  Male : 1970   Provider:  MARIBEL Lundberg           Reason for Visit     Results Slep Study Results       Allergies as of 3/8/2017     Allergen Noted Reactions    Lactose 2016         You were diagnosed with     Central sleep apnea   [550548]       TRIP on CPAP   [747717]       Obesity (BMI 35.0-39.9 without comorbidity) ([facility])   [962160]       History of mechanical aortic valve replacement   [494318]       History of TIA (transient ischemic attack)   [097225]       Essential hypertension   [4176392]       Paroxysmal atrial fibrillation (CMS-HCC)   [766351]       Chronic anticoagulation   [413136]       Presence of permanent cardiac pacemaker   [604622]       Chronic fatigue   [644275]         Vital Signs     Blood Pressure Pulse Respirations Height Weight Body Mass Index    116/84 mmHg 90 14 1.727 m (5' 8\") 113.853 kg (251 lb) 38.17 kg/m2    Smoking Status                   Never Smoker            Basic Information     Date Of Birth Sex Race Ethnicity Preferred Language    1970 Male White Non- English      Your appointments     Mar 09, 2017  4:00 PM   Established Patient with [facility] EXAM 5   [facility]  (--)    [address] [facility] [address]   [phone]            2017  4:20 PM   FOLLOW UP with MARIBEL Isaacs   [facility] (--)    [address] [place] [id number] [phone]            May 03, 2017  4:00 PM   PACER CHECK ONLY with PACER CHECK-[facility]   [facility] (--)    [address] [place] [address] [id number] [phone]            May 09, 2017  3:40 PM   Follow UP with MARIBEL Lundberg   [facility] (--)    [address] [facility] [place] [address]   [phone]              Problem List              ICD-10-CM Priority Class Noted - Resolved    Hypertension I10 Medium  Unknown - Present    History of mechanical aortic valve replacement Z95.2 Medium  Unknown - Present    Hyperlipoproteinemia E78.5 Medium  Unknown - Present    Presence of permanent cardiac pacemaker Z95.0 Medium  Unknown - Present    Chronic anticoagulation Z79.01 Medium  Unknown - Present    History of TIA (transient ischemic attack) Z86.73 Medium  Unknown - Present    TRIP on CPAP (Chronic) G47.33 Medium  Unknown - Present    Paroxysmal atrial fibrillation (CMS-[facility]) I48.0 Medium  9/16/2015 - Present    Shortness of breath R06.02 Medium  9/9/2016 - Present    Generalized edema R60.1 Medium  9/9/2016 - Present    Non-rheumatic mitral valve stenosis I34.2 Medium  10/11/2016 - Present    Dyspnea on exertion R06.09   10/13/2016 - Present    Ventral hernia without obstruction or gangrene K43.9   10/13/2016 - Present    Obesity (BMI 35.0-39.9 without comorbidity) ([facility]) E66.9   10/14/2016 - Present    Complications, mechanical, pacemaker, cardiac T82.111A   12/29/2016 - Present    Chronic fatigue R53.82   1/10/2017 - Present      Health Maintenance        Date Due Completion Dates    IMM DTaP/Tdap/Td Vaccine (1 - Tdap) 6/19/1990 ---    IMM INFLUENZA (1) 9/1/2016 ---            Current Immunizations     No immunizations on file.      Below and/or attached are the medications your provider expects you to take. Review all of your home medications and newly ordered medications with your provider and/or pharmacist. Follow medication instructions as directed by your provider and/or pharmacist. Please keep your medication list with you and share with your provider. Update the information when medications are discontinued, doses are changed, or new medications (including over-the-counter products) are added; and carry medication information at  all times in the event of emergency situations     Allergies:  LACTOSE - (reactions not documented)               Medications  Valid as of: March 08, 2017 -  2:36 PM    Generic Name Brand Name Tablet Size Instructions for use    Aspirin (Tablet Delayed Response) ECOTRIN 81 MG Take 1 Tab by mouth every day.        Atorvastatin Calcium (Tab) LIPITOR 20 MG Take 2 Tabs by mouth every day.        Doxycycline Hyclate (Tab) VIBRAMYCIN 100 MG Take 1 Tab by mouth 2 times a day.        Pantoprazole Sodium (Tablet Delayed Response) PROTONIX 40 MG Take 1 Tab by mouth every day.        Warfarin Sodium (Tab) COUMADIN 5 MG Take 1 Tab by mouth every day.        Warfarin Sodium (Tab) COUMADIN 5 MG Take 1.5 tablets (7.5 mg) on Mondays and Fridays and 5mg the rest of week or as directed by coumadin clinic        Warfarin Sodium (Tab) COUMADIN 2.5 MG Take one tablet by mouth every monday, wednesday, and friday.        .                 Medicines prescribed today were sent to:     [facility] [place] [facility] [address]    [facility] [place] [address]    Phone: [phone] Fax: [phone]    Open 24 Hours?: No      Medication refill instructions:       If your prescription bottle indicates you have medication refills left, it is not necessary to call your provider’s office. Please contact your pharmacy and they will refill your medication.    If your prescription bottle indicates you do not have any refills left, you may request refills at any time through one of the following ways: The online [employer] system (except Urgent Care), by calling your provider’s office, or by asking your pharmacy to contact your provider’s office with a refill request. Medication refills are processed only during regular business hours and may not be available until the next business day. Your provider may request additional information or to have a follow-up visit with you prior to refilling your medication.   *Please Note: Medication refills are  assigned a new Rx number when refilled electronically. Your pharmacy may indicate that no refills were authorized even though a new prescription for the same medication is available at the pharmacy. Please request the medicine by name with the pharmacy before contacting your provider for a refill.           MyChart Access Code: Activation code not generated  Current [employer] Status: Active

## 2017-03-08 NOTE — PROGRESS NOTES
Chief Complaint   Patient presents with   • Results     Slep Study Results        HPI:  Brandon Winters is a 46 y.o. year old male here today for follow-up on sleep study results. They moved here from [place] a couple of years ago. He has not had new mask or supplies in quite some time. Patient is currently using CPAP 10 cm H2O. His former sleep study was approximately 5 years ago through Dr. Zuñiga.  Patient has a significant cardiac history including A. fib, permanent pacemaker, mechanical aortic valve replacement, anticoagulated with Coumadin. He is followed by Dr. Brandon Chawla cardiology. Despite using CPAP the patient complains of occasional resuscitative gasps, morning headaches, restless sleep, significant daytime fatigue. He tells me he will not off easily during quiet movements of the day and naps on his days off. ESS is 18. He is a never smoker. Denies ETOH use. He typically drinks 8 carbonated beverages per day but is trying to cut back. He denies symptoms of narcolepsy or cataplexy. He complains of symptoms of restless leg. He has gained approximately 17 pounds in the past year. He understands the importance of routine exercise and the relation of weight gain to sleep apnea. He is hoping once fatigue improves he will be able to exercise more frequently.    PSG split-night 2/11/2017 indicated severe central sleep apnea with an AHI of 125.2 and a minimum oxygen saturation of 72%. Patient spent 44.9% sleep time less than 89% oxygen saturation. PLMS index of 13.7. Patient was titrated on CPAP. Recommendation was auto CPAP 10-15 cm H2O. Oxygen levels were adequate. He also achieved REM sleep. IF patient has a poor response, may need ASV titration due to 87% central events. I reviewed testing with patient and he is amenable to a new machine.    He denies any changes in health since last OV. He denies fever, chills, night sweats, chest pain, dyspnea, cough, wheezing or hemoptysis. He has noted  pedal edema that is worse by the end of the day and mostly resolves by the morning. INR levels have been stable.    ROS: As per HPI and otherwise negative if not stated.    Past Medical History   Diagnosis Date   • Hypertension    • Hyperlipoproteinemia    • Sleep apnea      CPAP   • Anticoagulation monitoring, special range    • Paroxysmal atrial fibrillation (CMS-HCC)    • Myocardial infarct (CMS-HCC) 11/2006   • Pacemaker      AICD   • Breath shortness    • Snoring    • Stroke (CMS-HCC) 2010     TIA- no deficits   • Congestive heart failure (CMS-HCC) 2/2007     complication following heart surgery, resolved now       Past Surgical History   Procedure Laterality Date   • Aortic valve replacement       2007   • Aortic valve replacement       mechanical   • Pacemaker insertion     • Mitral valve repair         Family History   Problem Relation Age of Onset   • Other Father      Hernia   • Heart Disease Father      Ascending Aortic Aneurysm Repair   • No Known Problems Mother    • No Known Problems Sister    • Cancer Paternal Uncle      Prostate Cancer   • Heart Disease Maternal Grandmother      ?Open Heart Surgery   • Dementia Maternal Grandmother    • Heart Disease Paternal Grandmother      ?Open Heart Surgery (CABG)   • Alzheimer's Disease Paternal Grandmother    • Heart Disease Paternal Grandfather      ?Aortic Aneurysm       Social History     Social History   • Marital Status: Single     Spouse Name: N/A   • Number of Children: N/A   • Years of Education: N/A     Occupational History   • Not on file.     Social History Main Topics   • Smoking status: Never Smoker    • Smokeless tobacco: Never Used   • Alcohol Use: No      Comment: 2-3 times a year   • Drug Use: No   • Sexual Activity:     Partners: Female     Birth Control/ Protection: Surgical     Other Topics Concern   • Not on file     Social History Narrative       Allergies as of 03/08/2017 - Yamil as Reviewed 03/08/2017   Allergen Reaction Noted   •  "Lactose  12/27/2016        @Vital signs for this encounter:  Filed Vitals:    03/08/17 1407   Height: 1.727 m (5' 8\")   Weight: 113.853 kg (251 lb)   Weight % change since last entry.: 0 %   BP: 116/84   Pulse: 90   BMI (Calculated): 38.16   Resp: 14   O2 sat % room air: 95 %       Current medications as of today   Current Outpatient Prescriptions   Medication Sig Dispense Refill   • warfarin (COUMADIN) 2.5 MG Tab Take one tablet by mouth every monday, wednesday, and friday. 100 Tab 2   • doxycycline (VIBRAMYCIN) 100 MG Tab Take 1 Tab by mouth 2 times a day. 10 Tab 0   • warfarin (COUMADIN) 5 MG Tab Take 1.5 tablets (7.5 mg) on Mondays and Fridays and 5mg the rest of week or as directed by coumadin clinic 35 Tab 2   • atorvastatin (LIPITOR) 20 MG Tab Take 2 Tabs by mouth every day. 180 Tab 3   • aspirin EC (ECOTRIN) 81 MG Tablet Delayed Response Take 1 Tab by mouth every day. 30 Tab    • warfarin (COUMADIN) 5 MG Tab Take 1 Tab by mouth every day. 100 Tab 3   • pantoprazole (PROTONIX) 40 MG Tablet Delayed Response Take 1 Tab by mouth every day. 90 Tab 3     No current facility-administered medications for this visit.         Physical Exam:   Gen:           Alert and oriented, No apparent distress. Mood and affect appropriate, normal interaction with examiner.  Eyes:          PERRL, EOM intact, sclere white, conjunctive moist. Glasses.  Ears:          Not examined.   Hearing:     Grossly intact.  Nose:          Normal, no lesions or deformities.  Dentition:    Good dentition.  Oropharynx:   Tongue normal, posterior pharynx without erythema or exudate.  Mallampati Classification: 3  Neck:        Supple, trachea midline, no masses.  Respiratory Effort: No intercostal retractions or use of accessory muscles.   Lung Auscultation:      Clear to auscultation bilaterally; no rales, rhonchi or wheezing.  CV:            Regular rate and rhythm. No murmurs, rubs or gallops. Pacemaker - valve replaced.  Abd:           Not " examined. Overweight.  Lymphadenopathy: Not examined.  Gait and Station: Normal.  Digits and Nails: No clubbing, cyanosis, petechiae, or nodes.   Cranial Nerves: II-XII grossly intact.  Skin:        No rashes, lesions or ulcers noted.               Ext:           No cyanosis or edema.      Assessment:  1. TRIP on CPAP     2. Obesity (BMI 35.0-39.9 without comorbidity) ([facility])     3. History of mechanical aortic valve replacement     4. History of TIA (transient ischemic attack)     5. Essential hypertension     6. Paroxysmal atrial fibrillation (CMS-[facility])     7. Chronic anticoagulation     8. Presence of permanent cardiac pacemaker     9. Chronic fatigue         Immunizations:    Flu:not given  Pneumovax 23:not given  Prevnar 13:not given    Plan:  1. DME order Autocpap 10-15cm H20 to use nightly.  2. Discussed sleep hygiene.  3. Encouraged weight loss.  4. Follow up in 2 months with compliance card, sooner if needed. If poor response, recommend ASV titration study.

## 2017-03-09 ENCOUNTER — ANTICOAGULATION VISIT (OUTPATIENT)
Dept: VASCULAR LAB | Facility: MEDICAL CENTER | Age: 47
End: 2017-03-09
Attending: INTERNAL MEDICINE
Payer: COMMERCIAL

## 2017-03-09 DIAGNOSIS — I48.0 PAROXYSMAL ATRIAL FIBRILLATION (HCC): ICD-10-CM

## 2017-03-09 DIAGNOSIS — Z95.2 HISTORY OF MECHANICAL AORTIC VALVE REPLACEMENT: ICD-10-CM

## 2017-03-09 DIAGNOSIS — Z79.01 CHRONIC ANTICOAGULATION: ICD-10-CM

## 2017-03-09 LAB — INR PPP: 1.4 (ref 2–3.5)

## 2017-03-09 PROCEDURE — 85610 PROTHROMBIN TIME: CPT

## 2017-03-09 PROCEDURE — 99212 OFFICE O/P EST SF 10 MIN: CPT

## 2017-03-09 NOTE — MR AVS SNAPSHOT
Brandon Winters   3/9/2017 4:00 PM   Anticoagulation Visit   MRN: [id number]    Department:  Vascular Medicine   Dept Phone:  [phone]    Description:  Male : 1970   Provider:  [facility] EXAM 5           Allergies as of 3/9/2017     Allergen Noted Reactions    Lactose 2016         You were diagnosed with     Paroxysmal atrial fibrillation (CMS-HCC)   [902443]       Chronic anticoagulation   [271860]       History of mechanical aortic valve replacement   [366653]         Vital Signs     Smoking Status                   Never Smoker            Basic Information     Date Of Birth Sex Race Ethnicity Preferred Language    1970 Male White Non- English      Your appointments     Mar 09, 2017  4:00 PM   Established Patient with [facility] EXAM 5   [facility]  (--)    [address] [facility]  [place] [address]   [phone]            Mar 16, 2017  4:00 PM   Established Patient with [facility] EXAM 5   [facility]  (--)    [address] [facility]  [place] [address]   [phone]            2017  4:20 PM   FOLLOW UP with MARIBEL Isaacs   [facility] (--)    [address] [facility] [facility] [address]  [place] [address] [phone]            May 03, 2017  4:00 PM   PACER CHECK ONLY with PACER CHECK-CAM B   [facility] (--)    [address] [facility] [address]  [place] [id number] [phone]            May 09, 2017  3:40 PM   Follow UP with MARIBEL Lundberg   [facility] (--)    [address] [facility] [facility]  [place] [id number] [phone]              Problem List              ICD-10-CM Priority Class Noted - Resolved    Hypertension I10 Medium  Unknown - Present    History of mechanical aortic valve replacement Z95.2 Medium  Unknown - Present    Hyperlipoproteinemia E78.5 Medium   Unknown - Present    Presence of permanent cardiac pacemaker Z95.0 Medium  Unknown - Present    Chronic anticoagulation Z79.01 Medium  Unknown - Present    History of TIA (transient ischemic attack) Z86.73 Medium  Unknown - Present    TRIP on CPAP (Chronic) G47.33 Medium  Unknown - Present    Paroxysmal atrial fibrillation (CMS-[facility]) I48.0 Medium  9/16/2015 - Present    Shortness of breath R06.02 Medium  9/9/2016 - Present    Generalized edema R60.1 Medium  9/9/2016 - Present    Non-rheumatic mitral valve stenosis I34.2 Medium  10/11/2016 - Present    Dyspnea on exertion R06.09   10/13/2016 - Present    Ventral hernia without obstruction or gangrene K43.9   10/13/2016 - Present    Obesity (BMI 35.0-39.9 without comorbidity) ([facility]) E66.9   10/14/2016 - Present    Complications, mechanical, pacemaker, cardiac T82.111A   12/29/2016 - Present    Chronic fatigue R53.82   1/10/2017 - Present      Health Maintenance        Date Due Completion Dates    IMM DTaP/Tdap/Td Vaccine (1 - Tdap) 6/19/1990 ---    IMM INFLUENZA (1) 9/1/2016 ---            Results     POCT Protime      Component    INR    1.4                        Current Immunizations     No immunizations on file.      Below and/or attached are the medications your provider expects you to take. Review all of your home medications and newly ordered medications with your provider and/or pharmacist. Follow medication instructions as directed by your provider and/or pharmacist. Please keep your medication list with you and share with your provider. Update the information when medications are discontinued, doses are changed, or new medications (including over-the-counter products) are added; and carry medication information at all times in the event of emergency situations     Allergies:  LACTOSE - (reactions not documented)               Medications  Valid as of: March 09, 2017 -  3:48 PM    Generic Name Brand Name Tablet Size Instructions for use    Aspirin (Tablet  Delayed Response) ECOTRIN 81 MG Take 1 Tab by mouth every day.        Atorvastatin Calcium (Tab) LIPITOR 20 MG Take 2 Tabs by mouth every day.        Doxycycline Hyclate (Tab) VIBRAMYCIN 100 MG Take 1 Tab by mouth 2 times a day.        Pantoprazole Sodium (Tablet Delayed Response) PROTONIX 40 MG Take 1 Tab by mouth every day.        Warfarin Sodium (Tab) COUMADIN 5 MG Take 1 Tab by mouth every day.        Warfarin Sodium (Tab) COUMADIN 5 MG Take 1.5 tablets (7.5 mg) on Mondays and Fridays and 5mg the rest of week or as directed by coumadin clinic        Warfarin Sodium (Tab) COUMADIN 2.5 MG Take one tablet by mouth every monday, wednesday, and friday.        .                 Medicines prescribed today were sent to:     [facility] [place] - [address]    [address] [place] [address]    Phone: [phone] Fax: [phone]    Open 24 Hours?: No      Medication refill instructions:       If your prescription bottle indicates you have medication refills left, it is not necessary to call your provider’s office. Please contact your pharmacy and they will refill your medication.    If your prescription bottle indicates you do not have any refills left, you may request refills at any time through one of the following ways: The online [employer] system (except Urgent Care), by calling your provider’s office, or by asking your pharmacy to contact your provider’s office with a refill request. Medication refills are processed only during regular business hours and may not be available until the next business day. Your provider may request additional information or to have a follow-up visit with you prior to refilling your medication.   *Please Note: Medication refills are assigned a new Rx number when refilled electronically. Your pharmacy may indicate that no refills were authorized even though a new prescription for the same medication is available at the pharmacy. Please request the medicine by name with the  pharmacy before contacting your provider for a refill.        Warfarin Dosing Calendar   March 2017 Details    Sun Mon Tue Wed Thu Fri Sat        1               2               3               4                 5               6               7               8               9   1.4   10 mg   See details      10      10 mg         11      10 mg           12      5 mg         13      5 mg         14      5 mg         15      5 mg         16      5 mg         17               18                 19               20               21               22               23               24               25                 26               27               28               29               30               31                 Date Details   03/09 This INR check   INR: 1.4       Date of next INR:  3/16/2017         How to take your warfarin dose     To take:  5 mg Take 1 of the 5 mg tablets.    To take:  10 mg Take 2 of the 5 mg tablets.              [employer] Access Code: Activation code not generated  Current [employer] Status: Active

## 2017-03-09 NOTE — PROGRESS NOTES
Anticoagulation Summary as of 3/9/2017     INR goal 2.5-3.5   Selected INR 1.4! (3/9/2017)   Maintenance plan 5 mg (5 mg x 1) every day   Weekly total 35 mg   Plan last modified Ginger Nguyen, PHARMD (1/26/2017)   Next INR check 3/16/2017   Target end date Indefinite    Indications   Paroxysmal atrial fibrillation (CMS-HCC) [I48.0]  Chronic anticoagulation [Z79.01]  History of mechanical aortic valve replacement [Z95.2]         Anticoagulation Episode Summary     INR check location [facility]    Preferred lab     Send INR reminders to     Comments [facility]      Anticoagulation Care Providers     Provider Role Specialty Phone number    Brandon Chawla M.D. Referring Cardiology [phone]    [facility] Responsible  [phone]    Rashi Shook, PHARMD Responsible          Anticoagulation Patient Findings      Brandon Winters seen in clinic today  INR  sub-therapeutic.    Denies signs/symptoms of bleeding and/or thrombosis.    Denies changes to diet or medications.   Follow up appointment in 1 week(s).    10mg x 3 days then continue weekly warfarin dose as noted    Figueroa Mata, PHARMD

## 2017-03-16 ENCOUNTER — APPOINTMENT (OUTPATIENT)
Dept: VASCULAR LAB | Facility: MEDICAL CENTER | Age: 47
End: 2017-03-16
Attending: INTERNAL MEDICINE
Payer: COMMERCIAL

## 2017-03-23 ENCOUNTER — TELEPHONE (OUTPATIENT)
Dept: VASCULAR LAB | Facility: MEDICAL CENTER | Age: 47
End: 2017-03-23

## 2017-03-23 NOTE — TELEPHONE ENCOUNTER
[facility]    Left a voicemail to remind pt to obtain next INR.     Rashi Shook, PHARMD

## 2017-04-13 ENCOUNTER — TELEPHONE (OUTPATIENT)
Dept: VASCULAR LAB | Facility: MEDICAL CENTER | Age: 47
End: 2017-04-13

## 2017-04-26 ENCOUNTER — TELEPHONE (OUTPATIENT)
Dept: VASCULAR LAB | Facility: MEDICAL CENTER | Age: 47
End: 2017-04-26

## 2017-04-26 DIAGNOSIS — Z79.01 CHRONIC ANTICOAGULATION: ICD-10-CM

## 2017-04-26 DIAGNOSIS — I48.0 PAROXYSMAL ATRIAL FIBRILLATION (HCC): ICD-10-CM

## 2017-04-26 DIAGNOSIS — Z95.2 HISTORY OF MECHANICAL AORTIC VALVE REPLACEMENT: ICD-10-CM

## 2017-04-26 RX ORDER — WARFARIN SODIUM 5 MG/1
TABLET ORAL
Qty: 30 TAB | Refills: 0 | Status: SHIPPED | OUTPATIENT
Start: 2017-04-26 | End: 2017-06-01

## 2017-04-26 NOTE — TELEPHONE ENCOUNTER
[facility]    Spoke with pt and refilled warfarin for 1 month while awaiting pt to obtain INR.  Pt states he is too busy to obtain INR, but agreed to going into a [facility] lab in the next week on his free time to obtain INR.      Rashi Shook, PHARMD

## 2017-05-05 ENCOUNTER — TELEPHONE (OUTPATIENT)
Dept: VASCULAR LAB | Facility: MEDICAL CENTER | Age: 47
End: 2017-05-05

## 2017-05-05 NOTE — TELEPHONE ENCOUNTER
[facility]    Spoke with pt.  He still has not obtained an INR even though he committed to if his warfarin was refilled.  Educated pt importance of monitoring.  Pt states he is well aware because he has been on the medication for 12 years.    Rashi Shook, PHARMD

## 2017-05-09 ENCOUNTER — SLEEP CENTER VISIT (OUTPATIENT)
Dept: SLEEP MEDICINE | Facility: MEDICAL CENTER | Age: 47
End: 2017-05-09
Payer: COMMERCIAL

## 2017-05-09 VITALS
DIASTOLIC BLOOD PRESSURE: 79 MMHG | WEIGHT: 243 LBS | OXYGEN SATURATION: 96 % | BODY MASS INDEX: 36.83 KG/M2 | RESPIRATION RATE: 16 BRPM | HEIGHT: 68 IN | SYSTOLIC BLOOD PRESSURE: 138 MMHG | HEART RATE: 94 BPM

## 2017-05-09 DIAGNOSIS — Z79.01 CHRONIC ANTICOAGULATION: ICD-10-CM

## 2017-05-09 DIAGNOSIS — Z95.0 PRESENCE OF PERMANENT CARDIAC PACEMAKER: ICD-10-CM

## 2017-05-09 DIAGNOSIS — I48.0 PAROXYSMAL ATRIAL FIBRILLATION (HCC): ICD-10-CM

## 2017-05-09 DIAGNOSIS — Z95.2 HISTORY OF MECHANICAL AORTIC VALVE REPLACEMENT: ICD-10-CM

## 2017-05-09 DIAGNOSIS — I10 ESSENTIAL HYPERTENSION: ICD-10-CM

## 2017-05-09 DIAGNOSIS — E66.9 OBESITY (BMI 35.0-39.9 WITHOUT COMORBIDITY): ICD-10-CM

## 2017-05-09 DIAGNOSIS — G47.33 OSA ON CPAP: Chronic | ICD-10-CM

## 2017-05-09 PROCEDURE — 99213 OFFICE O/P EST LOW 20 MIN: CPT | Performed by: NURSE PRACTITIONER

## 2017-05-09 NOTE — MR AVS SNAPSHOT
"        Brandon Winters   2017 3:40 PM   Sleep Center Visit   MRN: [id number]    Department:  [facility]   Dept Phone:  [phone]    Description:  Male : 1970   Provider:  MARIBEL Lundberg           Reason for Visit     Follow-Up           Allergies as of 2017     Allergen Noted Reactions    Lactose 2016         You were diagnosed with     TRIP on CPAP   [397842]       Obesity (BMI 35.0-39.9 without comorbidity) (HCC)   [680503]       History of mechanical aortic valve replacement   [243090]       Paroxysmal atrial fibrillation (CMS-HCC)   [308734]       Chronic anticoagulation   [659278]       Essential hypertension   [5588011]       Presence of permanent cardiac pacemaker   [555415]         Vital Signs     Blood Pressure Pulse Respirations Height Weight Body Mass Index    138/79 mmHg 94 16 1.727 m (5' 7.99\") 110.224 kg (243 lb) 36.96 kg/m2    Oxygen Saturation Smoking Status                96% Never Smoker           Basic Information     Date Of Birth Sex Race Ethnicity Preferred Language    1970 Male White Non- English      Your appointments     2017  4:20 PM   Follow UP with MARIBEL Alfaro   [facility] (--)    [address]  [place] [id number] [phone]              Problem List              ICD-10-CM Priority Class Noted - Resolved    Hypertension I10 Medium  Unknown - Present    History of mechanical aortic valve replacement Z95.2 Medium  Unknown - Present    Hyperlipoproteinemia E78.5 Medium  Unknown - Present    Presence of permanent cardiac pacemaker Z95.0 Medium  Unknown - Present    Chronic anticoagulation Z79.01 Medium  Unknown - Present    History of TIA (transient ischemic attack) Z86.73 Medium  Unknown - Present    TRIP on CPAP (Chronic) G47.33, Z99.89 Medium  Unknown - Present    Paroxysmal atrial fibrillation (CMS-HCC) I48.0 Medium  2015 - Present    Shortness of " breath R06.02 Medium  9/9/2016 - Present    Generalized edema R60.1 Medium  9/9/2016 - Present    Non-rheumatic mitral valve stenosis I34.2 Medium  10/11/2016 - Present    Dyspnea on exertion R06.09   10/13/2016 - Present    Ventral hernia without obstruction or gangrene K43.9   10/13/2016 - Present    Obesity (BMI 35.0-39.9 without comorbidity) (HCC) E66.9   10/14/2016 - Present    Complications, mechanical, pacemaker, cardiac T82.111A   12/29/2016 - Present    Chronic fatigue R53.82   1/10/2017 - Present      Health Maintenance        Date Due Completion Dates    IMM DTaP/Tdap/Td Vaccine (1 - Tdap) 6/19/1990 ---            Current Immunizations     No immunizations on file.      Below and/or attached are the medications your provider expects you to take. Review all of your home medications and newly ordered medications with your provider and/or pharmacist. Follow medication instructions as directed by your provider and/or pharmacist. Please keep your medication list with you and share with your provider. Update the information when medications are discontinued, doses are changed, or new medications (including over-the-counter products) are added; and carry medication information at all times in the event of emergency situations     Allergies:  LACTOSE - (reactions not documented)               Medications  Valid as of: May 09, 2017 -  4:18 PM    Generic Name Brand Name Tablet Size Instructions for use    Aspirin (Tablet Delayed Response) ECOTRIN 81 MG Take 1 Tab by mouth every day.        Atorvastatin Calcium (Tab) LIPITOR 20 MG Take 2 Tabs by mouth every day.        Doxycycline Hyclate (Tab) VIBRAMYCIN 100 MG Take 1 Tab by mouth 2 times a day.        Pantoprazole Sodium (Tablet Delayed Response) PROTONIX 40 MG Take 1 Tab by mouth every day.        Warfarin Sodium (Tab) COUMADIN 5 MG Take 1 Tab by mouth every day.        Warfarin Sodium (Tab) COUMADIN 2.5 MG Take one tablet by mouth every monday, wednesday, and  friday.        Warfarin Sodium (Tab) COUMADIN 5 MG Take 1 tablet by mouth daily or as directed by coumadin clinic        .                 Medicines prescribed today were sent to:     [facility] [place] [place] - [address]    [address] [place] [address]    Phone: [phone] Fax: [phone]    Open 24 Hours?: No      Medication refill instructions:       If your prescription bottle indicates you have medication refills left, it is not necessary to call your provider’s office. Please contact your pharmacy and they will refill your medication.    If your prescription bottle indicates you do not have any refills left, you may request refills at any time through one of the following ways: The online [employer] system (except Urgent Care), by calling your provider’s office, or by asking your pharmacy to contact your provider’s office with a refill request. Medication refills are processed only during regular business hours and may not be available until the next business day. Your provider may request additional information or to have a follow-up visit with you prior to refilling your medication.   *Please Note: Medication refills are assigned a new Rx number when refilled electronically. Your pharmacy may indicate that no refills were authorized even though a new prescription for the same medication is available at the pharmacy. Please request the medicine by name with the pharmacy before contacting your provider for a refill.           [employer] Access Code: Activation code not generated  Current [employer] Status: Active

## 2017-05-09 NOTE — PROGRESS NOTES
Chief Complaint   Patient presents with   • Follow-Up       HPI:  Yanab Radha Nolasco is a 46 y.o. year old male here today for follow-up on TRIP. This is his first compliance check. He does not have his compliance card ([facility] [place], [place] did not provide one in machine) and unable to download wirelessly.     He moved here from [place] a couple of years ago. He has not had new mask or supplies in quite some time. Patient is currently using CPAP 10 cm H2O. His former sleep study was approximately 5 years ago through Dr. Zuñiga.  Patient has a significant cardiac history including A. fib, permanent pacemaker, mechanical aortic valve replacement, anticoagulated with Coumadin. He is followed by Dr. Brandon Chawla cardiology. Despite using CPAP the patient complains of occasional resuscitative gasps, morning headaches, restless sleep, significant daytime fatigue. He tells me he will not off easily during quiet movements of the day and naps on his days off. ESS is 18. He is a never smoker. Denies ETOH use. He typically drinks 8 carbonated beverages per day but is trying to cut back. He denies symptoms of narcolepsy or cataplexy. He complains of symptoms of restless leg. He has gained approximately 17 pounds in the past year. He understands the importance of routine exercise and the relation of weight gain to sleep apnea. He is hoping once fatigue improves he will be able to exercise more frequently.    PSG split-night 2/11/2017 indicated severe central sleep apnea with an AHI of 125.2 and a minimum oxygen saturation of 72%. Patient spent 44.9% sleep time less than 89% oxygen saturation. PLMS index of 13.7. Patient was titrated on CPAP. Recommendation was auto CPAP 10-15 cm H2O. Oxygen levels were adequate. He also achieved REM sleep. IF patient has a poor response, may need ASV titration due to 87% central events. He was started on Autocpap 10-15cm H20 nightly. He likes his new machine. He is disappointed with service from [facility] [place] [place]  due to one representative at the business that was inappropriate and was not helpful. He is using nightly for 7-8hrs and AHI last night was 1.1 according to [employer]. He tolerates mask/pressure well. He denies EDS or morning headaches.    He denies any changes in health since last OV. He denies fever, chills, night sweats, chest pain, dyspnea, cough, wheezing or hemoptysis. He has noted pedal edema that is worse by the end of the day and mostly resolves by the morning. INR levels have been stable.      ROS: As per HPI and otherwise negative if not stated.    Past Medical History   Diagnosis Date   • Hypertension    • Hyperlipoproteinemia    • Sleep apnea      CPAP   • Anticoagulation monitoring, special range    • Paroxysmal atrial fibrillation (CMS-HCC)    • Myocardial infarct (CMS-HCC) 11/2006   • Pacemaker      AICD   • Breath shortness    • Snoring    • Stroke (CMS-HCC) 2010     TIA- no deficits   • Congestive heart failure (CMS-HCC) 2/2007     complication following heart surgery, resolved now       Past Surgical History   Procedure Laterality Date   • Aortic valve replacement       2007   • Aortic valve replacement       mechanical   • Pacemaker insertion     • Mitral valve repair         Family History   Problem Relation Age of Onset   • Other Father      Hernia   • Heart Disease Father      Ascending Aortic Aneurysm Repair   • No Known Problems Mother    • No Known Problems Sister    • Cancer Paternal Uncle      Prostate Cancer   • Heart Disease Maternal Grandmother      ?Open Heart Surgery   • Dementia Maternal Grandmother    • Heart Disease Paternal Grandmother      ?Open Heart Surgery (CABG)   • Alzheimer's Disease Paternal Grandmother    • Heart Disease Paternal Grandfather      ?Aortic Aneurysm       Social History     Social History   • Marital Status: Single     Spouse Name: N/A   • Number of Children: N/A   • Years of Education: N/A     Occupational History   • Not on file.     Social History  "Main Topics   • Smoking status: Never Smoker    • Smokeless tobacco: Never Used   • Alcohol Use: No      Comment: 2-3 times a year   • Drug Use: No   • Sexual Activity:     Partners: Female     Birth Control/ Protection: Surgical     Other Topics Concern   • Not on file     Social History Narrative       Allergies as of 05/09/2017 - Yamil as Reviewed 05/09/2017   Allergen Reaction Noted   • Lactose  12/27/2016        @Vital signs for this encounter:  Filed Vitals:    05/09/17 1535   Height: 1.727 m (5' 7.99\")   Weight: 110.224 kg (243 lb)   Weight % change since last entry.: 0 %   BP: 138/79   Pulse: 94   BMI (Calculated): 36.96   Resp: 16   O2 sat % room air: 96 %       Current medications as of today   Current Outpatient Prescriptions   Medication Sig Dispense Refill   • warfarin (COUMADIN) 5 MG Tab Take 1 Tab by mouth every day. 100 Tab 3   • warfarin (COUMADIN) 5 MG Tab Take 1 tablet by mouth daily or as directed by coumadin clinic 30 Tab 0   • warfarin (COUMADIN) 2.5 MG Tab Take one tablet by mouth every monday, wednesday, and friday. (Patient not taking: Reported on 5/9/2017) 100 Tab 2   • doxycycline (VIBRAMYCIN) 100 MG Tab Take 1 Tab by mouth 2 times a day. (Patient not taking: Reported on 5/9/2017) 10 Tab 0   • atorvastatin (LIPITOR) 20 MG Tab Take 2 Tabs by mouth every day. (Patient not taking: Reported on 5/9/2017) 180 Tab 3   • aspirin EC (ECOTRIN) 81 MG Tablet Delayed Response Take 1 Tab by mouth every day. 30 Tab    • pantoprazole (PROTONIX) 40 MG Tablet Delayed Response Take 1 Tab by mouth every day. 90 Tab 3     No current facility-administered medications for this visit.         Physical Exam:   Gen:           Alert and oriented, No apparent distress. Mood and affect appropriate, normal interaction with examiner.  Eyes:          PERRL, EOM intact, sclere white, conjunctive moist. Glasses.  Ears:          Not examined.   Hearing:     Grossly intact.  Nose:          Normal, no lesions or " deformities.  Dentition:    Good dentition.  Oropharynx:   Tongue normal, posterior pharynx without erythema or exudate.  Mallampati Classification: 3  Neck:        Supple, trachea midline, no masses.  Respiratory Effort: No intercostal retractions or use of accessory muscles.   Lung Auscultation:      Clear to auscultation bilaterally; no rales, rhonchi or wheezing.  CV:            Regular rate and rhythm. No murmurs, rubs or gallops.  Abd:           Not examined.   Lymphadenopathy: Not examined.  Gait and Station: Normal.  Digits and Nails: No clubbing, cyanosis, petechiae, or nodes.   Cranial Nerves: II-XII grossly intact.  Skin:        No rashes, lesions or ulcers noted.               Ext:           No cyanosis or edema.      Assessment:  1. TRIP on CPAP     2. Obesity (BMI 35.0-39.9 without comorbidity) ([facility])     3. History of mechanical aortic valve replacement     4. Paroxysmal atrial fibrillation (CMS-[facility])     5. Chronic anticoagulation     6. Essential hypertension     7. Presence of permanent cardiac pacemaker         Immunizations:    Flu:not given  Pneumovax 23:not given  Prevnar 13:not given    Plan:  1. Continue CPAP 10-15cm H20 nightly.  2. DME order SIM card now and AHI download.  3. Discussed sleep hygiene.  4. Follow up in 6 months with compliance card, sooner if needed.

## 2017-05-16 ENCOUNTER — TELEPHONE (OUTPATIENT)
Dept: VASCULAR LAB | Facility: MEDICAL CENTER | Age: 47
End: 2017-05-16

## 2017-05-16 NOTE — TELEPHONE ENCOUNTER
Patient was referred to vascular medicine by cardio thoracic surgery for medical management and surveillance of ascending aortic aneurysm status post repair and aVR.  When reached by scheduling, he refused to make an appointment  He does see cardiology on a regular basis    CT surgery is recommending continued medical management and echocardiogram surveillance.    Until or unless patient is willing to make an appointment to see us in the office, we will defer all medical management and risk factor modification to cardiology and PCP.    Based on CT surgery's recommendations, we will continue to assist in making sure the patient has appropriate yearly echocardiograms for review of cardiology    Michael J. Bloch, MD  Vascular Care    CC:  Brandon Recio

## 2017-05-25 ENCOUNTER — TELEPHONE (OUTPATIENT)
Dept: MEDICAL GROUP | Facility: MEDICAL CENTER | Age: 47
End: 2017-05-25

## 2017-05-26 ENCOUNTER — TELEPHONE (OUTPATIENT)
Dept: VASCULAR LAB | Facility: MEDICAL CENTER | Age: 47
End: 2017-05-26

## 2017-05-26 NOTE — TELEPHONE ENCOUNTER
"[facility]    Pt will obtain INR when he \"gets around to it\".  Difficult to motivate pt to obtain INR.    Rashi Shook, REYESD   "

## 2017-05-31 ENCOUNTER — HOSPITAL ENCOUNTER (OUTPATIENT)
Dept: LAB | Facility: MEDICAL CENTER | Age: 47
End: 2017-05-31
Attending: NURSE PRACTITIONER
Payer: COMMERCIAL

## 2017-05-31 LAB
INR PPP: 3.23 (ref 0.87–1.13)
PROTHROMBIN TIME: 34 SEC (ref 12–14.6)

## 2017-05-31 PROCEDURE — 36415 COLL VENOUS BLD VENIPUNCTURE: CPT

## 2017-05-31 PROCEDURE — 85610 PROTHROMBIN TIME: CPT

## 2017-06-01 ENCOUNTER — ANTICOAGULATION MONITORING (OUTPATIENT)
Dept: VASCULAR LAB | Facility: MEDICAL CENTER | Age: 47
End: 2017-06-01

## 2017-06-01 DIAGNOSIS — I48.0 PAROXYSMAL ATRIAL FIBRILLATION (HCC): ICD-10-CM

## 2017-06-01 DIAGNOSIS — Z79.01 CHRONIC ANTICOAGULATION: ICD-10-CM

## 2017-06-01 DIAGNOSIS — Z95.2 MECHANICAL HEART VALVE PRESENT: ICD-10-CM

## 2017-06-01 DIAGNOSIS — Z95.2 HISTORY OF MECHANICAL AORTIC VALVE REPLACEMENT: ICD-10-CM

## 2017-06-01 RX ORDER — WARFARIN SODIUM 5 MG/1
TABLET ORAL
Qty: 90 TAB | Refills: 1 | Status: SHIPPED | OUTPATIENT
Start: 2017-06-01 | End: 2017-12-13 | Stop reason: SDUPTHER

## 2017-06-01 NOTE — PROGRESS NOTES
Anticoagulation Summary as of 6/1/2017     INR goal 2.5-3.5   Selected INR 3.23 (5/31/2017)   Maintenance plan 5 mg (5 mg x 1) every day   Weekly total 35 mg   Plan last modified REYES ZapataD (1/26/2017)   Next INR check 7/12/2017   Target end date Indefinite    Indications   Paroxysmal atrial fibrillation (CMS-HCC) [I48.0]  Chronic anticoagulation [Z79.01]  History of mechanical aortic valve replacement [Z95.2]         Anticoagulation Episode Summary     [facility] check [facility]    Preferred lab     Send INR reminders to     Comments [facility]      Anticoagulation Care Providers     Provider Role Specialty Phone number    Brandon Chawla M.D. Referring Cardiology [phone]    [facility] Responsible  [phone]    Rashi Shook, PHARMD Responsible          Spoke with Shailesh to report a therapeutic INR of 3.23. Pt is to continue with current warfarin dosing regimen.  Pt denies any unusual s/s of bleeding, bruising, clotting or any changes to diet or medications.  Follow up in 6 weeks.    Yanelis Dong, PHARMD

## 2017-07-14 ENCOUNTER — TELEPHONE (OUTPATIENT)
Dept: MEDICAL GROUP | Facility: MEDICAL CENTER | Age: 47
End: 2017-07-14

## 2017-07-14 NOTE — TELEPHONE ENCOUNTER
ESTABLISHED PATIENT PRE-VISIT PLANNING     Note: Patient will not be contacted if there is no indication to call.     1.  Reviewed notes from the last few office visits within the medical group: Yes 10/13/2016    2.  If any orders were placed at last visit or intended to be done for this visit (i.e. 6 mos follow-up), do we have Results/Consult Notes?        •  Labs - Labs were not ordered at last office visit.       •  Imaging - Imaging was not ordered at last office visit.       •  Referrals - referral to cardiology auth     3. Is this appointment scheduled as a Hospital Follow-Up? No    4.  Immunizations were updated in Epic using WebIZ?: No WebIZ record       •  Web Iz Recommendations:n/a    5.  Patient is due for the following Health Maintenance Topics:   Health Maintenance Due   Topic Date Due   • IMM DTaP/Tdap/Td Vaccine (1 - Tdap) 06/19/1990           6.  Patient was NOT informed to arrive 15 min prior to their scheduled appointment and bring in their medication bottles.

## 2017-07-17 ENCOUNTER — APPOINTMENT (OUTPATIENT)
Dept: MEDICAL GROUP | Facility: MEDICAL CENTER | Age: 47
End: 2017-07-17
Payer: COMMERCIAL

## 2017-07-26 ENCOUNTER — HOSPITAL ENCOUNTER (OUTPATIENT)
Dept: LAB | Facility: MEDICAL CENTER | Age: 47
End: 2017-07-26
Attending: NURSE PRACTITIONER
Payer: COMMERCIAL

## 2017-07-26 ENCOUNTER — TELEPHONE (OUTPATIENT)
Dept: VASCULAR LAB | Facility: MEDICAL CENTER | Age: 47
End: 2017-07-26

## 2017-07-26 LAB
INR PPP: 1.87 (ref 0.87–1.13)
PROTHROMBIN TIME: 22.1 SEC (ref 12–14.6)

## 2017-07-26 PROCEDURE — 85610 PROTHROMBIN TIME: CPT

## 2017-07-26 PROCEDURE — 36415 COLL VENOUS BLD VENIPUNCTURE: CPT

## 2017-07-27 ENCOUNTER — ANTICOAGULATION MONITORING (OUTPATIENT)
Dept: VASCULAR LAB | Facility: MEDICAL CENTER | Age: 47
End: 2017-07-27

## 2017-07-27 DIAGNOSIS — I48.0 PAROXYSMAL ATRIAL FIBRILLATION (HCC): ICD-10-CM

## 2017-07-27 DIAGNOSIS — Z79.01 CHRONIC ANTICOAGULATION: ICD-10-CM

## 2017-07-27 DIAGNOSIS — Z95.2 HISTORY OF MECHANICAL AORTIC VALVE REPLACEMENT: ICD-10-CM

## 2017-07-27 NOTE — PROGRESS NOTES
Anticoagulation Summary as of 7/27/2017     INR goal 2.5-3.5   Selected INR 1.87! (7/26/2017)   Maintenance plan 5 mg (5 mg x 1) every day   Weekly total 35 mg   Plan last modified Ginger Nguyen, PHARMD (1/26/2017)   Next INR check 8/2/2017   Target end date Indefinite    Indications   Paroxysmal atrial fibrillation (CMS-HCC) [I48.0]  Chronic anticoagulation [Z79.01]  History of mechanical aortic valve replacement [Z95.2]         Anticoagulation Episode Summary     [facility]    Preferred lab     Send INR reminders to     Comments [facility]      Anticoagulation Care Providers     Provider Role Specialty Phone number    Brandon Chawla M.D. Referring Cardiology [phone]    [facility] Responsible  [phone]    Rashi Shook, PHARMD Responsible          Anticoagulation Patient Findings      Spoke with pateint.  INR is SUB therapeutic.   Pt missed his warfarin dose a couple days ago.   Pt denies any unusual s/s of bleeding, bruising, clotting or any changes to diet or medications. Denies any etoh, cranberries, supplements, or illness.   Pt verifies warfarin weekly dosing.     Discussed benefit vs risk with low INR, and lovenox. Pt declines lovenox, but will boost warfarin dose to 10mg today and then resume normal weekly dose.     Repeat INR in 1 week.     Rosy Mathew, PHARMD    8/11/2017    Concur with plan outlined above    Javier Treviño, GovindD

## 2017-08-08 ENCOUNTER — TELEPHONE (OUTPATIENT)
Dept: VASCULAR LAB | Facility: MEDICAL CENTER | Age: 47
End: 2017-08-08

## 2017-08-16 ENCOUNTER — ANTICOAGULATION MONITORING (OUTPATIENT)
Dept: VASCULAR LAB | Facility: MEDICAL CENTER | Age: 47
End: 2017-08-16

## 2017-08-16 DIAGNOSIS — Z79.01 CHRONIC ANTICOAGULATION: ICD-10-CM

## 2017-08-16 DIAGNOSIS — I48.0 PAROXYSMAL ATRIAL FIBRILLATION (HCC): ICD-10-CM

## 2017-08-16 DIAGNOSIS — Z95.2 HISTORY OF MECHANICAL AORTIC VALVE REPLACEMENT: ICD-10-CM

## 2017-08-17 ENCOUNTER — TELEPHONE (OUTPATIENT)
Dept: VASCULAR LAB | Facility: MEDICAL CENTER | Age: 47
End: 2017-08-17

## 2017-08-17 DIAGNOSIS — Z95.2 HISTORY OF MECHANICAL AORTIC VALVE REPLACEMENT: ICD-10-CM

## 2017-08-17 DIAGNOSIS — I71.21 ASCENDING AORTIC ANEURYSM (HCC): ICD-10-CM

## 2017-08-17 DIAGNOSIS — I34.2 NON-RHEUMATIC MITRAL VALVE STENOSIS: ICD-10-CM

## 2017-09-05 ENCOUNTER — TELEPHONE (OUTPATIENT)
Dept: VASCULAR LAB | Facility: MEDICAL CENTER | Age: 47
End: 2017-09-05

## 2017-09-12 ENCOUNTER — HOSPITAL ENCOUNTER (OUTPATIENT)
Dept: LAB | Facility: MEDICAL CENTER | Age: 47
End: 2017-09-12
Attending: NURSE PRACTITIONER
Payer: COMMERCIAL

## 2017-09-12 DIAGNOSIS — Z95.2 HISTORY OF MECHANICAL AORTIC VALVE REPLACEMENT: ICD-10-CM

## 2017-09-12 LAB
INR PPP: 3.38 (ref 0.87–1.13)
PROTHROMBIN TIME: 35.2 SEC (ref 12–14.6)

## 2017-09-12 PROCEDURE — 36415 COLL VENOUS BLD VENIPUNCTURE: CPT

## 2017-09-12 PROCEDURE — 85610 PROTHROMBIN TIME: CPT

## 2017-09-13 ENCOUNTER — ANTICOAGULATION MONITORING (OUTPATIENT)
Dept: VASCULAR LAB | Facility: MEDICAL CENTER | Age: 47
End: 2017-09-13

## 2017-09-13 DIAGNOSIS — Z95.2 HISTORY OF MECHANICAL AORTIC VALVE REPLACEMENT: ICD-10-CM

## 2017-09-13 DIAGNOSIS — I48.0 PAROXYSMAL ATRIAL FIBRILLATION (HCC): ICD-10-CM

## 2017-09-13 DIAGNOSIS — Z79.01 CHRONIC ANTICOAGULATION: ICD-10-CM

## 2017-09-13 NOTE — PROGRESS NOTES
Anticoagulation Summary  As of 9/13/2017    INR goal:   2.5-3.5   TTR:   47.8 % (2 y)   Today's INR:   3.38 (9/12/2017)   Maintenance plan:   5 mg (5 mg x 1) every day   Weekly total:   35 mg   Plan last modified:   Ginger Nguyen PharmD (1/26/2017)   Next INR check:   10/18/2017   Target end date:   Indefinite    Indications    Paroxysmal atrial fibrillation (CMS-HCC) [I48.0]  Chronic anticoagulation [Z79.01]  History of mechanical aortic valve replacement [Z95.2]             Anticoagulation Episode Summary     [facility]   [facility]    Preferred lab:       Send INR reminders to:       Comments:   [facility]      Anticoagulation Care Providers     Provider Role Specialty Phone number    Brandon Chawla M.D. Referring Cardiology [phone]    [facility] Responsible  [phone]    Govind SolisD Responsible          Anticoagulation Patient Findings    Spoke with patient to report a therapeutic INR.    Pt instructed to continue with current warfarin dosing regimen, confirms dosing.   Pt denies any s/s of bleeding, bruising, clotting or any changes to diet or medication.    Will follow up in 5 weeks.     Rosy Mathew, PharmD

## 2017-09-25 ENCOUNTER — TELEPHONE (OUTPATIENT)
Dept: VASCULAR LAB | Facility: MEDICAL CENTER | Age: 47
End: 2017-09-25

## 2017-09-25 NOTE — TELEPHONE ENCOUNTER
Spoke with the pt, and gave him the number for the scheduling dept and our office number if he has any questions. Reminded that his last echo was 1 yr ago and is due.  GURDEEP Paulino.

## 2017-10-12 ENCOUNTER — TELEPHONE (OUTPATIENT)
Dept: VASCULAR LAB | Facility: MEDICAL CENTER | Age: 47
End: 2017-10-12

## 2017-10-30 ENCOUNTER — TELEPHONE (OUTPATIENT)
Dept: VASCULAR LAB | Facility: MEDICAL CENTER | Age: 47
End: 2017-10-30

## 2017-10-30 NOTE — TELEPHONE ENCOUNTER
LM on VM to schedule his echo through scheduling office or to call our office if he needs the echo ordered through a different facility. GURDEEP Paulino.

## 2017-11-02 ENCOUNTER — TELEPHONE (OUTPATIENT)
Dept: VASCULAR LAB | Facility: MEDICAL CENTER | Age: 47
End: 2017-11-02

## 2017-11-08 ENCOUNTER — APPOINTMENT (OUTPATIENT)
Dept: SLEEP MEDICINE | Facility: MEDICAL CENTER | Age: 47
End: 2017-11-08
Payer: COMMERCIAL

## 2017-11-09 DIAGNOSIS — I48.0 PAROXYSMAL ATRIAL FIBRILLATION (HCC): ICD-10-CM

## 2017-11-16 ENCOUNTER — TELEPHONE (OUTPATIENT)
Dept: VASCULAR LAB | Facility: MEDICAL CENTER | Age: 47
End: 2017-11-16

## 2017-11-16 ENCOUNTER — HOSPITAL ENCOUNTER (OUTPATIENT)
Dept: LAB | Facility: MEDICAL CENTER | Age: 47
End: 2017-11-16
Attending: NURSE PRACTITIONER
Payer: COMMERCIAL

## 2017-11-16 LAB
INR PPP: 2.28 (ref 0.87–1.13)
PROTHROMBIN TIME: 24.8 SEC (ref 12–14.6)

## 2017-11-16 PROCEDURE — 36415 COLL VENOUS BLD VENIPUNCTURE: CPT

## 2017-11-16 PROCEDURE — 85610 PROTHROMBIN TIME: CPT

## 2017-11-17 ENCOUNTER — ANTICOAGULATION MONITORING (OUTPATIENT)
Dept: VASCULAR LAB | Facility: MEDICAL CENTER | Age: 47
End: 2017-11-17

## 2017-11-17 DIAGNOSIS — Z95.2 HISTORY OF MECHANICAL AORTIC VALVE REPLACEMENT: ICD-10-CM

## 2017-11-17 DIAGNOSIS — I48.0 PAROXYSMAL ATRIAL FIBRILLATION (HCC): ICD-10-CM

## 2017-11-17 DIAGNOSIS — Z79.01 CHRONIC ANTICOAGULATION: ICD-10-CM

## 2017-11-17 NOTE — PROGRESS NOTES
Anticoagulation Summary  As of 11/17/2017    INR goal:   2.5-3.5   TTR:   50.5 % (2.1 y)   Today's INR:   2.28! (11/16/2017)   Maintenance plan:   5 mg (5 mg x 1) every day   Weekly total:   35 mg   Plan last modified:   Ginger Nguyen PharmD (1/26/2017)   Next INR check:   12/15/2017   Target end date:   Indefinite    Indications    Paroxysmal atrial fibrillation (CMS-HCC) [I48.0]  Chronic anticoagulation [Z79.01]  History of mechanical aortic valve replacement [Z95.2]             Anticoagulation Episode Summary     [facility]   [facility]    Preferred lab:       Send INR reminders to:       Comments:   [facility]      Anticoagulation Care Providers     Provider Role Specialty Phone number    Brandon Chawla M.D. Referring Cardiology [phone]    [facility] Responsible  [phone]    Rashi Shook, PharmD Responsible          Anticoagulation Patient Findings      HPI:  Brandon Winters, on anticoagulation therapy with warfarin for MVR.   Changes to current medical/health status since last appt: None  Denies signs/symptoms of bleeding and/or thrombosis since the last appt.    Denies any interval changes to diet  Denies any interval changes to medications since last appt.   Denies any complications or cost restrictions with current therapy.     A/P   INR  SUB-therapeutic.   Bolus today then Pt is to continue with current warfarin dosing regimen.     Next INR in 4 week(s) per pt preference.    Rashi Shook, GovindD

## 2017-11-28 ENCOUNTER — TELEPHONE (OUTPATIENT)
Dept: VASCULAR LAB | Facility: MEDICAL CENTER | Age: 47
End: 2017-11-28

## 2017-11-28 NOTE — TELEPHONE ENCOUNTER
LM on VM that his echo is due . Phone to office and scheduling office provided. GURDEEP Paulino.

## 2017-12-14 ENCOUNTER — TELEPHONE (OUTPATIENT)
Dept: VASCULAR LAB | Facility: MEDICAL CENTER | Age: 47
End: 2017-12-14

## 2017-12-14 NOTE — TELEPHONE ENCOUNTER
Pt will check out his options for co-pay and let me know where he needs the order sent.  Phone to office and scheduling office provided. GURDEEP Paulino.

## 2017-12-26 ENCOUNTER — TELEPHONE (OUTPATIENT)
Dept: VASCULAR LAB | Facility: MEDICAL CENTER | Age: 47
End: 2017-12-26

## 2017-12-26 NOTE — TELEPHONE ENCOUNTER
LM on VM that he can call back to let us know where he would like to have his echo done at. Office number provided and scheduling number provided. GURDEEP Paulino.

## 2018-01-04 ENCOUNTER — TELEPHONE (OUTPATIENT)
Dept: VASCULAR LAB | Facility: MEDICAL CENTER | Age: 48
End: 2018-01-04

## 2018-01-07 ENCOUNTER — APPOINTMENT (OUTPATIENT)
Dept: RADIOLOGY | Facility: MEDICAL CENTER | Age: 48
DRG: 987 | End: 2018-01-07
Attending: EMERGENCY MEDICINE
Payer: COMMERCIAL

## 2018-01-07 ENCOUNTER — RESOLUTE PROFESSIONAL BILLING HOSPITAL PROF FEE (OUTPATIENT)
Dept: HOSPITALIST | Facility: MEDICAL CENTER | Age: 48
End: 2018-01-07
Payer: COMMERCIAL

## 2018-01-07 ENCOUNTER — HOSPITAL ENCOUNTER (INPATIENT)
Facility: MEDICAL CENTER | Age: 48
LOS: 7 days | DRG: 987 | End: 2018-01-14
Attending: EMERGENCY MEDICINE | Admitting: HOSPITALIST
Payer: COMMERCIAL

## 2018-01-07 DIAGNOSIS — R10.11 RIGHT UPPER QUADRANT PAIN: ICD-10-CM

## 2018-01-07 DIAGNOSIS — K81.0 ACUTE CHOLECYSTITIS: ICD-10-CM

## 2018-01-07 PROBLEM — J10.1 INFLUENZA B: Status: ACTIVE | Noted: 2018-01-07

## 2018-01-07 LAB
ALBUMIN SERPL BCP-MCNC: 4.2 G/DL (ref 3.2–4.9)
ALBUMIN/GLOB SERPL: 1.4 G/DL
ALP SERPL-CCNC: 58 U/L (ref 30–99)
ALT SERPL-CCNC: 66 U/L (ref 2–50)
ANION GAP SERPL CALC-SCNC: 9 MMOL/L (ref 0–11.9)
APPEARANCE UR: CLEAR
APTT PPP: 49.3 SEC (ref 24.7–36)
AST SERPL-CCNC: 55 U/L (ref 12–45)
BASOPHILS # BLD AUTO: 0.5 % (ref 0–1.8)
BASOPHILS # BLD: 0.03 K/UL (ref 0–0.12)
BILIRUB SERPL-MCNC: 3.9 MG/DL (ref 0.1–1.5)
BILIRUB UR QL STRIP.AUTO: ABNORMAL
BNP SERPL-MCNC: 63 PG/ML (ref 0–100)
BUN SERPL-MCNC: 15 MG/DL (ref 8–22)
CALCIUM SERPL-MCNC: 8.7 MG/DL (ref 8.4–10.2)
CHLORIDE SERPL-SCNC: 105 MMOL/L (ref 96–112)
CO2 SERPL-SCNC: 25 MMOL/L (ref 20–33)
COLOR UR: YELLOW
CREAT SERPL-MCNC: 0.91 MG/DL (ref 0.5–1.4)
EKG IMPRESSION: NORMAL
EOSINOPHIL # BLD AUTO: 0.08 K/UL (ref 0–0.51)
EOSINOPHIL NFR BLD: 1.3 % (ref 0–6.9)
ERYTHROCYTE [DISTWIDTH] IN BLOOD BY AUTOMATED COUNT: 42.3 FL (ref 35.9–50)
FLUAV RNA SPEC QL NAA+PROBE: NEGATIVE
FLUAV+FLUBV AG SPEC QL IA: ABNORMAL
FLUBV RNA SPEC QL NAA+PROBE: POSITIVE
GFR SERPL CREATININE-BSD FRML MDRD: >60 ML/MIN/1.73 M 2
GLOBULIN SER CALC-MCNC: 2.9 G/DL (ref 1.9–3.5)
GLUCOSE SERPL-MCNC: 118 MG/DL (ref 65–99)
GLUCOSE UR STRIP.AUTO-MCNC: NEGATIVE MG/DL
HCT VFR BLD AUTO: 39.5 % (ref 42–52)
HGB BLD-MCNC: 13.9 G/DL (ref 14–18)
IMM GRANULOCYTES # BLD AUTO: 0.08 K/UL (ref 0–0.11)
IMM GRANULOCYTES NFR BLD AUTO: 1.3 % (ref 0–0.9)
INR PPP: 3.02 (ref 0.87–1.13)
KETONES UR STRIP.AUTO-MCNC: NEGATIVE MG/DL
LACTATE BLD-SCNC: 1.16 MMOL/L (ref 0.5–2)
LACTATE BLD-SCNC: 1.23 MMOL/L (ref 0.5–2)
LEUKOCYTE ESTERASE UR QL STRIP.AUTO: NEGATIVE
LIPASE SERPL-CCNC: 18 U/L (ref 7–58)
LYMPHOCYTES # BLD AUTO: 0.59 K/UL (ref 1–4.8)
LYMPHOCYTES NFR BLD: 9.8 % (ref 22–41)
MCH RBC QN AUTO: 30.9 PG (ref 27–33)
MCHC RBC AUTO-ENTMCNC: 35.2 G/DL (ref 33.7–35.3)
MCV RBC AUTO: 87.8 FL (ref 81.4–97.8)
MICRO URNS: ABNORMAL
MONOCYTES # BLD AUTO: 0.66 K/UL (ref 0–0.85)
MONOCYTES NFR BLD AUTO: 10.9 % (ref 0–13.4)
NEUTROPHILS # BLD AUTO: 4.61 K/UL (ref 1.82–7.42)
NEUTROPHILS NFR BLD: 76.2 % (ref 44–72)
NITRITE UR QL STRIP.AUTO: NEGATIVE
NRBC # BLD AUTO: 0 K/UL
NRBC BLD-RTO: 0 /100 WBC
PH UR STRIP.AUTO: 6 [PH]
PLATELET # BLD AUTO: 119 K/UL (ref 164–446)
PMV BLD AUTO: 9.3 FL (ref 9–12.9)
POTASSIUM SERPL-SCNC: 4.3 MMOL/L (ref 3.6–5.5)
PROT SERPL-MCNC: 7.1 G/DL (ref 6–8.2)
PROT UR QL STRIP: NEGATIVE MG/DL
PROTHROMBIN TIME: 30.8 SEC (ref 12–14.6)
RBC # BLD AUTO: 4.5 M/UL (ref 4.7–6.1)
RBC UR QL AUTO: NEGATIVE
SIGNIFICANT IND 70042: ABNORMAL
SITE SITE: ABNORMAL
SODIUM SERPL-SCNC: 139 MMOL/L (ref 135–145)
SOURCE SOURCE: ABNORMAL
SP GR UR STRIP.AUTO: 1.01
SPECIMEN DRAWN FROM PATIENT: NORMAL
SPECIMEN DRAWN FROM PATIENT: NORMAL
TROPONIN I SERPL-MCNC: <0.02 NG/ML (ref 0–0.04)
WBC # BLD AUTO: 6.1 K/UL (ref 4.8–10.8)

## 2018-01-07 PROCEDURE — 85730 THROMBOPLASTIN TIME PARTIAL: CPT

## 2018-01-07 PROCEDURE — 700105 HCHG RX REV CODE 258: Performed by: HOSPITALIST

## 2018-01-07 PROCEDURE — A9270 NON-COVERED ITEM OR SERVICE: HCPCS | Performed by: EMERGENCY MEDICINE

## 2018-01-07 PROCEDURE — 36415 COLL VENOUS BLD VENIPUNCTURE: CPT

## 2018-01-07 PROCEDURE — 700102 HCHG RX REV CODE 250 W/ 637 OVERRIDE(OP): Performed by: EMERGENCY MEDICINE

## 2018-01-07 PROCEDURE — 93005 ELECTROCARDIOGRAM TRACING: CPT | Performed by: EMERGENCY MEDICINE

## 2018-01-07 PROCEDURE — 700111 HCHG RX REV CODE 636 W/ 250 OVERRIDE (IP)

## 2018-01-07 PROCEDURE — 96374 THER/PROPH/DIAG INJ IV PUSH: CPT

## 2018-01-07 PROCEDURE — 76705 ECHO EXAM OF ABDOMEN: CPT

## 2018-01-07 PROCEDURE — 700111 HCHG RX REV CODE 636 W/ 250 OVERRIDE (IP): Performed by: EMERGENCY MEDICINE

## 2018-01-07 PROCEDURE — 87400 INFLUENZA A/B EACH AG IA: CPT

## 2018-01-07 PROCEDURE — 83880 ASSAY OF NATRIURETIC PEPTIDE: CPT

## 2018-01-07 PROCEDURE — 87040 BLOOD CULTURE FOR BACTERIA: CPT

## 2018-01-07 PROCEDURE — 81003 URINALYSIS AUTO W/O SCOPE: CPT

## 2018-01-07 PROCEDURE — 94660 CPAP INITIATION&MGMT: CPT

## 2018-01-07 PROCEDURE — 770020 HCHG ROOM/CARE - TELE (206)

## 2018-01-07 PROCEDURE — 94760 N-INVAS EAR/PLS OXIMETRY 1: CPT

## 2018-01-07 PROCEDURE — 71045 X-RAY EXAM CHEST 1 VIEW: CPT

## 2018-01-07 PROCEDURE — A9270 NON-COVERED ITEM OR SERVICE: HCPCS | Performed by: HOSPITALIST

## 2018-01-07 PROCEDURE — 700111 HCHG RX REV CODE 636 W/ 250 OVERRIDE (IP): Performed by: HOSPITALIST

## 2018-01-07 PROCEDURE — 96375 TX/PRO/DX INJ NEW DRUG ADDON: CPT

## 2018-01-07 PROCEDURE — 700102 HCHG RX REV CODE 250 W/ 637 OVERRIDE(OP): Performed by: HOSPITALIST

## 2018-01-07 PROCEDURE — 80053 COMPREHEN METABOLIC PANEL: CPT

## 2018-01-07 PROCEDURE — 99285 EMERGENCY DEPT VISIT HI MDM: CPT

## 2018-01-07 PROCEDURE — 85610 PROTHROMBIN TIME: CPT

## 2018-01-07 PROCEDURE — 99223 1ST HOSP IP/OBS HIGH 75: CPT | Performed by: HOSPITALIST

## 2018-01-07 PROCEDURE — 700101 HCHG RX REV CODE 250

## 2018-01-07 PROCEDURE — 83605 ASSAY OF LACTIC ACID: CPT | Mod: 91

## 2018-01-07 PROCEDURE — 85025 COMPLETE CBC W/AUTO DIFF WBC: CPT

## 2018-01-07 PROCEDURE — 84484 ASSAY OF TROPONIN QUANT: CPT

## 2018-01-07 PROCEDURE — 83690 ASSAY OF LIPASE: CPT

## 2018-01-07 PROCEDURE — 87502 INFLUENZA DNA AMP PROBE: CPT

## 2018-01-07 PROCEDURE — 700105 HCHG RX REV CODE 258

## 2018-01-07 RX ORDER — POLYETHYLENE GLYCOL 3350 17 G/17G
1 POWDER, FOR SOLUTION ORAL
Status: DISCONTINUED | OUTPATIENT
Start: 2018-01-07 | End: 2018-01-14 | Stop reason: HOSPADM

## 2018-01-07 RX ORDER — SODIUM CHLORIDE 9 MG/ML
500 INJECTION, SOLUTION INTRAVENOUS
Status: DISCONTINUED | OUTPATIENT
Start: 2018-01-07 | End: 2018-01-14 | Stop reason: HOSPADM

## 2018-01-07 RX ORDER — NAPROXEN SODIUM 220 MG
440 TABLET ORAL PRN
Status: ON HOLD | COMMUNITY
End: 2018-01-14

## 2018-01-07 RX ORDER — BISACODYL 10 MG
10 SUPPOSITORY, RECTAL RECTAL
Status: DISCONTINUED | OUTPATIENT
Start: 2018-01-07 | End: 2018-01-14 | Stop reason: HOSPADM

## 2018-01-07 RX ORDER — PHYTONADIONE 5 MG/1
5 TABLET ORAL ONCE
Status: COMPLETED | OUTPATIENT
Start: 2018-01-07 | End: 2018-01-07

## 2018-01-07 RX ORDER — SODIUM CHLORIDE 9 MG/ML
1000 INJECTION, SOLUTION INTRAVENOUS ONCE
Status: DISCONTINUED | OUTPATIENT
Start: 2018-01-07 | End: 2018-01-07

## 2018-01-07 RX ORDER — OXYCODONE HYDROCHLORIDE 5 MG/1
5 TABLET ORAL
Status: DISCONTINUED | OUTPATIENT
Start: 2018-01-07 | End: 2018-01-09

## 2018-01-07 RX ORDER — OXYCODONE HYDROCHLORIDE 5 MG/1
2.5 TABLET ORAL
Status: DISCONTINUED | OUTPATIENT
Start: 2018-01-07 | End: 2018-01-09

## 2018-01-07 RX ORDER — AMOXICILLIN 250 MG
2 CAPSULE ORAL 2 TIMES DAILY
Status: DISCONTINUED | OUTPATIENT
Start: 2018-01-07 | End: 2018-01-14 | Stop reason: HOSPADM

## 2018-01-07 RX ORDER — ONDANSETRON 2 MG/ML
4 INJECTION INTRAMUSCULAR; INTRAVENOUS ONCE
Status: COMPLETED | OUTPATIENT
Start: 2018-01-07 | End: 2018-01-07

## 2018-01-07 RX ORDER — ACETAMINOPHEN 325 MG/1
650 TABLET ORAL EVERY 6 HOURS PRN
Status: DISCONTINUED | OUTPATIENT
Start: 2018-01-07 | End: 2018-01-14 | Stop reason: HOSPADM

## 2018-01-07 RX ORDER — ZOLPIDEM TARTRATE 5 MG/1
5 TABLET ORAL
Status: DISCONTINUED | OUTPATIENT
Start: 2018-01-07 | End: 2018-01-14 | Stop reason: HOSPADM

## 2018-01-07 RX ORDER — LABETALOL HYDROCHLORIDE 5 MG/ML
10 INJECTION, SOLUTION INTRAVENOUS EVERY 4 HOURS PRN
Status: DISCONTINUED | OUTPATIENT
Start: 2018-01-07 | End: 2018-01-14 | Stop reason: HOSPADM

## 2018-01-07 RX ADMIN — OXYCODONE HYDROCHLORIDE 2.5 MG: 5 TABLET ORAL at 19:32

## 2018-01-07 RX ADMIN — STANDARDIZED SENNA CONCENTRATE AND DOCUSATE SODIUM 2 TABLET: 8.6; 5 TABLET, FILM COATED ORAL at 16:08

## 2018-01-07 RX ADMIN — PHYTONADIONE 5 MG: 5 TABLET ORAL at 14:20

## 2018-01-07 RX ADMIN — PHYTONADIONE 5 MG: 10 INJECTION, EMULSION INTRAMUSCULAR; INTRAVENOUS; SUBCUTANEOUS at 16:08

## 2018-01-07 RX ADMIN — ONDANSETRON 4 MG: 2 INJECTION INTRAMUSCULAR; INTRAVENOUS at 13:12

## 2018-01-07 RX ADMIN — HYDROMORPHONE HYDROCHLORIDE 1 MG: 1 INJECTION, SOLUTION INTRAMUSCULAR; INTRAVENOUS; SUBCUTANEOUS at 13:12

## 2018-01-07 RX ADMIN — PIPERACILLIN AND TAZOBACTAM: 4; .5 INJECTION, POWDER, LYOPHILIZED, FOR SOLUTION INTRAVENOUS; PARENTERAL at 14:21

## 2018-01-07 RX ADMIN — PIPERACILLIN AND TAZOBACTAM 3.38 G: 3; .375 INJECTION, POWDER, FOR SOLUTION INTRAVENOUS at 19:39

## 2018-01-07 RX ADMIN — OXYCODONE HYDROCHLORIDE 5 MG: 5 TABLET ORAL at 22:33

## 2018-01-07 ASSESSMENT — ENCOUNTER SYMPTOMS
ABDOMINAL PAIN: 1
CHILLS: 1
NAUSEA: 1
SHORTNESS OF BREATH: 0
PND: 0
BLOOD IN STOOL: 0
FALLS: 0
LOSS OF CONSCIOUSNESS: 0
PSYCHIATRIC NEGATIVE: 1
DIARRHEA: 0
VOMITING: 0
PALPITATIONS: 0
CONSTIPATION: 0
HEADACHES: 0
FEVER: 1

## 2018-01-07 ASSESSMENT — PATIENT HEALTH QUESTIONNAIRE - PHQ9
7. TROUBLE CONCENTRATING ON THINGS, SUCH AS READING THE NEWSPAPER OR WATCHING TELEVISION: SEVERAL DAYS
4. FEELING TIRED OR HAVING LITTLE ENERGY: NEARLY EVERY DAY
SUM OF ALL RESPONSES TO PHQ QUESTIONS 1-9: 10
3. TROUBLE FALLING OR STAYING ASLEEP OR SLEEPING TOO MUCH: NOT AT ALL
2. FEELING DOWN, DEPRESSED, IRRITABLE, OR HOPELESS: SEVERAL DAYS
9. THOUGHTS THAT YOU WOULD BE BETTER OFF DEAD, OR OF HURTING YOURSELF: NOT AT ALL
6. FEELING BAD ABOUT YOURSELF - OR THAT YOU ARE A FAILURE OR HAVE LET YOURSELF OR YOUR FAMILY DOWN: SEVERAL DAYS
5. POOR APPETITE OR OVEREATING: SEVERAL DAYS
SUM OF ALL RESPONSES TO PHQ9 QUESTIONS 1 AND 2: 4
8. MOVING OR SPEAKING SO SLOWLY THAT OTHER PEOPLE COULD HAVE NOTICED. OR THE OPPOSITE, BEING SO FIGETY OR RESTLESS THAT YOU HAVE BEEN MOVING AROUND A LOT MORE THAN USUAL: NOT AT ALL
1. LITTLE INTEREST OR PLEASURE IN DOING THINGS: NEARLY EVERY DAY

## 2018-01-07 ASSESSMENT — LIFESTYLE VARIABLES
ALCOHOL_USE: NO
EVER_SMOKED: NEVER
EVER_SMOKED: NEVER

## 2018-01-07 ASSESSMENT — PAIN SCALES - GENERAL
PAINLEVEL_OUTOF10: 4
PAINLEVEL_OUTOF10: 2
PAINLEVEL_OUTOF10: 2
PAINLEVEL_OUTOF10: 6
PAINLEVEL_OUTOF10: 0
PAINLEVEL_OUTOF10: 5

## 2018-01-07 NOTE — ED NOTES
"Chief Complaint   Patient presents with   • Shortness of Breath     \"for quite a while\"   • Low Back Pain     HX of same, started getting worse yesterday   • Abdominal Pain     \"for quite a while\"     /84   Pulse (!) 104   Temp 37.2 °C (99 °F)   Resp 20   Ht 1.702 m (5' 7\")   Wt 113.6 kg (250 lb 7.1 oz)   SpO2 92%   BMI 39.22 kg/m²     Pt currently denies c/o CP.  "

## 2018-01-07 NOTE — ASSESSMENT & PLAN NOTE
Rising LFTs. US gallbladder showed cholelithiasis with wall thickening. No signs of sepsis.  - continue zosyn  - cholecystectomy with Dr. Mcallister tomorrow 6pm  - clear diet, NPO after breakfast tomorrow  - pain control  - blood cultures neg  For surgery today.  S/p lap madhavi on 1/9, did well. Pain better controlled today.   Pain is well controlled. Had erythema below umbilical surgical site no tender will continue monitoring.   Stable.

## 2018-01-07 NOTE — ASSESSMENT & PLAN NOTE
pAFib and h/o mechanical aortic valve replacement. INR goal 2.5-3.5. But plans for surgery tomorrow night. INR 1.6 after vit K.   - given risks of thrombosis with mech valve and pAfib, will bridge on heparin until surgery tomorrow  Will restart heparin and warfarin when ok with surgery.   1/10 as per surgery ok to start warfarin today but wants to hold on full A/C until hb stable. Will keep monitoring if INR not therapeutic tomorrow and hb stable will probably restart heparin drip if ok with surgery.   Will start low dose lovenox today for d/c prophylaxis as per surgery recommendation.   INR 2.4 today.

## 2018-01-07 NOTE — ASSESSMENT & PLAN NOTE
Mild interstitial markings on CXR with symptoms of PND. Only trace LE edema and no JVD present. Last echo was done in Sept 2016 showed EF 60% and moderate mitral stenosis. Influenza B is positive.  - limit IVF as possible  - repeat echo  - tamiflu  cxr on 1/9 showing worsening infiltrates, possible pneumonia vs ARDS, on vanco, zosyn. o2 per protocol.   1/10 procalcitonin is elevated, continue vanc and zosyn for now, repeat cxr in am.   Due to complicated bacterial pneumonia on patient with influenza.  Improving, will get new cxr in am.  cxr did not show much improvement but he is feeling better trying to wean off o2. Will stop vanco since cx neg, procalcitonin trending down, continue zosyn.

## 2018-01-07 NOTE — ED NOTES
We are unable to collect urine sample presently.  Pt is aware of importance to obtain specimen when possible and to call for assistance prior to voiding.  Clean catch procedure has been explained; pt verbalizes understanding.

## 2018-01-07 NOTE — ED NOTES
US of gallbladder was completed uneventfully.  Pt has been unable to produce a urine sample thus far.

## 2018-01-07 NOTE — ED PROVIDER NOTES
"ED Provider Note    CHIEF COMPLAINT  Chief Complaint   Patient presents with   • Shortness of Breath     \"for quite a while\"   • Low Back Pain     HX of same, started getting worse yesterday   • Abdominal Pain     \"for quite a while\"       HPI  Brandon Winters is a 47 y.o. male who presents for evaluation of shortness of breath abdominal pain, discomfort low back pain. The patient has extensive medical history including underlying heart failure, pacemaker, he is on anticoagulation history of atrial fibrillation, obstructive sleep apnea. No report of abdominal surgery. He denies chest pain but does report exertional shortness of breath and orthopnea. No associated hemoptysis but he does report some mild leg swelling. No hematemesis or hematochezia no other symptoms reported    REVIEW OF SYSTEMS  See HPI for further details.   No reported Fevers chills night sweats weight loss numbness tingling or weakness All other systems are negative.     PAST MEDICAL HISTORY  Past Medical History:   Diagnosis Date   • Stroke (CMS-HCC) 2010    TIA- no deficits   • Congestive heart failure (CMS-HCC) 2/2007    complication following heart surgery, resolved now   • Myocardial infarct 11/2006   • Anticoagulation monitoring, special range    • Breath shortness    • Hyperlipoproteinemia    • Hypertension    • Pacemaker     AICD   • Paroxysmal atrial fibrillation (CMS-HCC)    • Sleep apnea     CPAP   • Snoring        FAMILY HISTORY  No history of bleeding disorder    SOCIAL HISTORY  Social History     Social History   • Marital status: Single     Spouse name: N/A   • Number of children: N/A   • Years of education: N/A     Social History Main Topics   • Smoking status: Never Smoker   • Smokeless tobacco: Never Used   • Alcohol use Yes   • Drug use: No   • Sexual activity: Not Currently     Partners: Female     Birth control/ protection: Surgical     Other Topics Concern   • Not on file     Social History Narrative   • No narrative " "on file   Nonsmoker no IV drugs     SURGICAL HISTORY  Past Surgical History:   Procedure Laterality Date   • AORTIC VALVE REPLACEMENT      2007   • AORTIC VALVE REPLACEMENT      mechanical   • MITRAL VALVE REPAIR     • PACEMAKER INSERTION         CURRENT MEDICATIONS    Current Facility-Administered Medications:   •  piperacillin-tazobactam (ZOSYN) 4.5 g in  mL IVPB, 4.5 g, Intravenous, Once, Will Javier M.D.  •  phytonadione (MEPHYTON) tablet 5 mg, 5 mg, Oral, Once, Will Javier M.D.    Current Outpatient Prescriptions:   •  naproxen (ALEVE) 220 MG tablet, Take 440 mg by mouth as needed (For back pain)., Disp: , Rfl:   •  warfarin (COUMADIN) 5 MG Tab, Take one tablet by mouth once daily as instructed by [facility] anticoagulation services, Disp: 90 Tab, Rfl: 1      ALLERGIES  Allergies   Allergen Reactions   • Lactose Diarrhea     Pt states \"I get diarrhea and upset stomach\".         PHYSICAL EXAM  VITAL SIGNS: /84   Pulse 61   Temp 37.2 °C (99 °F)   Resp 17   Ht 1.702 m (5' 7\")   Wt 113.6 kg (250 lb 7.1 oz)   SpO2 93%   BMI 39.22 kg/m²  Room air O2: 92    Constitutional: Well developed, Well nourished, No acute distress, Non-toxic appearance.   HENT: Normocephalic, Atraumatic, Bilateral external ears normal, Oropharynx moist, No oral exudates, Nose normal.   Eyes: PERRLA, EOMI, Conjunctiva normal, No discharge.   Neck: Normal range of motion, No tenderness, Supple, No stridor.   Lymphatic: No lymphadenopathy noted.   Cardiovascular: Normal heart rate, Normal rhythm, No murmurs, No rubs, No gallops.   Thorax & Lungs: Normal breath sounds, No respiratory distress, No wheezing, No chest tenderness.   Abdomen: Bowel sounds normal,Reproducible right upper quadrant tenderness  Skin: Warm, Dry, No erythema, No rash.   Back: No tenderness, No CVA tenderness.   Extremities: Intact distal pulses, No edema, No tenderness, No cyanosis, No clubbing.   Musculoskeletal: Good range of motion in all major " joints. No tenderness to palpation or major deformities noted.   Neurologic: Alert & oriented x 3, Normal motor function, Normal sensory function, No focal deficits noted.   Psychiatric: Anxious    EKG  Atrial paced rhythm rate 60. No significant change from prior tracing on 12/29/2016    RADIOLOGY/PROCEDURES  US-GALLBLADDER   Final Result      1.  Cholelithiasis with gallbladder wall thickening. Findings are consistent with acute cholecystitis in the appropriate clinical setting.      2.  Hepatomegaly      3.  Prominent intrahepatic IVC and hepatic veins. Bidirectional portal venous flow. Findings are suggestive of volume overload. Valvular disease could have a similar appearance.      DX-CHEST-PORTABLE (1 VIEW)   Final Result      1.  Mild cardiomegaly.      2.  Increased perihilar interstitial markings are suggestive of interstitial edema. Atypical infection could have a similar appearance.        Results for orders placed or performed during the hospital encounter of 01/07/18   CBC WITH DIFFERENTIAL   Result Value Ref Range    WBC 6.1 4.8 - 10.8 K/uL    RBC 4.50 (L) 4.70 - 6.10 M/uL    Hemoglobin 13.9 (L) 14.0 - 18.0 g/dL    Hematocrit 39.5 (L) 42.0 - 52.0 %    MCV 87.8 81.4 - 97.8 fL    MCH 30.9 27.0 - 33.0 pg    MCHC 35.2 33.7 - 35.3 g/dL    RDW 42.3 35.9 - 50.0 fL    Platelet Count 119 (L) 164 - 446 K/uL    MPV 9.3 9.0 - 12.9 fL    Neutrophils-Polys 76.20 (H) 44.00 - 72.00 %    Lymphocytes 9.80 (L) 22.00 - 41.00 %    Monocytes 10.90 0.00 - 13.40 %    Eosinophils 1.30 0.00 - 6.90 %    Basophils 0.50 0.00 - 1.80 %    Immature Granulocytes 1.30 (H) 0.00 - 0.90 %    Nucleated RBC 0.00 /100 WBC    Neutrophils (Absolute) 4.61 1.82 - 7.42 K/uL    Lymphs (Absolute) 0.59 (L) 1.00 - 4.80 K/uL    Monos (Absolute) 0.66 0.00 - 0.85 K/uL    Eos (Absolute) 0.08 0.00 - 0.51 K/uL    Baso (Absolute) 0.03 0.00 - 0.12 K/uL    Immature Granulocytes (abs) 0.08 0.00 - 0.11 K/uL    NRBC (Absolute) 0.00 K/uL   COMP METABOLIC PANEL    Result Value Ref Range    Sodium 139 135 - 145 mmol/L    Potassium 4.3 3.6 - 5.5 mmol/L    Chloride 105 96 - 112 mmol/L    Co2 25 20 - 33 mmol/L    Anion Gap 9.0 0.0 - 11.9    Glucose 118 (H) 65 - 99 mg/dL    Bun 15 8 - 22 mg/dL    Creatinine 0.91 0.50 - 1.40 mg/dL    Calcium 8.7 8.4 - 10.2 mg/dL    AST(SGOT) 55 (H) 12 - 45 U/L    ALT(SGPT) 66 (H) 2 - 50 U/L    Alkaline Phosphatase 58 30 - 99 U/L    Total Bilirubin 3.9 (H) 0.1 - 1.5 mg/dL    Albumin 4.2 3.2 - 4.9 g/dL    Total Protein 7.1 6.0 - 8.2 g/dL    Globulin 2.9 1.9 - 3.5 g/dL    A-G Ratio 1.4 g/dL   LIPASE   Result Value Ref Range    Lipase 18 7 - 58 U/L   TROPONIN   Result Value Ref Range    Troponin I <0.02 0.00 - 0.04 ng/mL   PROTHROMBIN TIME   Result Value Ref Range    PT 30.8 (H) 12.0 - 14.6 sec    INR 3.02 (H) 0.87 - 1.13   APTT   Result Value Ref Range    APTT 49.3 (H) 24.7 - 36.0 sec   BTYPE NATRIURETIC PEPTIDE   Result Value Ref Range    B Natriuretic Peptide 63 0 - 100 pg/mL   URINALYSIS   Result Value Ref Range    Micro Urine Req see below     Color Yellow     Character Clear     Specific Gravity 1.015 <1.035    Ph 6.0 5.0 - 8.0    Glucose Negative Negative mg/dL    Ketones Negative Negative mg/dL    Protein Negative Negative mg/dL    Bilirubin Small (A) Negative    Nitrite Negative Negative    Leukocyte Esterase Negative Negative    Occult Blood Negative Negative   ESTIMATED GFR   Result Value Ref Range    GFR If African American >60 >60 mL/min/1.73 m 2    GFR If Non African American >60 >60 mL/min/1.73 m 2   EKG (ER)   Result Value Ref Range    Report       [facility] Emergency Dept.    Test Date:  2018  Pt Name:    SHERIDAN COHN            Department: [facility]  MRN:        [id number]                      [facility]  Gender:     Male                         Technician: JESS  :        1970                   Requested By:OFELIA DIEZ  Order #:    [id number]                    Avery MD: OFELIA KNIGHT  MD RG    Measurements  Intervals                                Axis  Rate:       60                           P:  CO:                                      QRS:        107  QRSD:       143                          T:          -30  QT:         416  QTc:        416    Interpretive Statements  Junctional rhythm  Nonspecific intraventricular conduction delay  Lateral infarct, age indeterminate  Anterior infarct, possibly acute  Compared to ECG 12/29/2016 09:09:43  Junctional rhythm now present  Intraventricular conduction delay now present  Myocardial infarct finding now present  Atrial-paced complex( es) or rhythm no longer present  Ventricular-paced complex(es) or rhythm no longer present    Electronically Signed On 1-7-2018 10:38:36 PST by WILL JAVIER MD          COURSE & MEDICAL DECISION MAKING  Pertinent Labs & Imaging studies reviewed. (See chart for details)  An IV was established. The patient was given some pain and nausea medicine. He has several complaints but consistently reported right upper quadrant pain. Here he had evidence of mild transaminitis left shift slight bilirubin elevation and a gallbladder ultrasound which demonstrated what appeared to be acute cholecystitis I reviewed these findings with  with general surgery who feels the patient will need to be admitted to the hospital he does not feel, and I agree that emergent laparotomy is indicated. He has recommended admission for IV antibiotics, oral vitamin K to correct his coagulopathy due to Coumadin usage and he will evaluate the patient and consider surgery tomorrow    FINAL IMPRESSION  1. Biliary colic versus acute cholecystitis    Admission         Electronically signed by: Will Javier, 1/7/2018 10:24 AM

## 2018-01-07 NOTE — ASSESSMENT & PLAN NOTE
Positive swab. Symptoms have been improving, per patient.   - given hypoxia, started on tamiflu  Now probably complicated with bacterial pneumonia, added vancomycin since there is risk for MRSA infection. Continue zosyn.   Stable .  cxr today showing stable b/l infiltrate L>R pulmonologist consulted continue current atb.   cxr today stable.

## 2018-01-07 NOTE — H&P
" Hospital Medicine History and Physical    Date of Service  1/7/2018    Chief Complaint  Chief Complaint   Patient presents with   • Shortness of Breath     \"for quite a while\"   • Low Back Pain     HX of same, started getting worse yesterday   • Abdominal Pain     \"for quite a while\"       History of Presenting Illness  47 y.o. male who presented 1/7/2018 with multiple concerns including abdominal pain and SOB. He states that he has early satiety, and abdominal pain associated with food intake. He has noted darker urine and lighter colored stool over the last few days as well. Decreased appetite and food intake over the last month secondary to these symptoms. Then, for the last 2-3 days he has noted SOB and PND. Has never had symptoms like this before.    Primary Care Physician  Brandon Arreola M.D.    Consultants  Gen Surgery    Code Status  Full    Review of Systems  Review of Systems   Constitutional: Positive for chills, fever and malaise/fatigue.   Respiratory: Negative for shortness of breath.    Cardiovascular: Positive for leg swelling. Negative for chest pain, palpitations and PND.   Gastrointestinal: Positive for abdominal pain and nausea. Negative for blood in stool, constipation, diarrhea and vomiting.   Genitourinary: Negative for dysuria.   Musculoskeletal: Positive for joint pain. Negative for falls.   Neurological: Negative for loss of consciousness and headaches.   Psychiatric/Behavioral: Negative.    All other systems reviewed and are negative.       Past Medical History  Past Medical History:   Diagnosis Date   • Stroke (CMS-HCC) 2010    TIA- no deficits   • Congestive heart failure (CMS-HCC) 2/2007    complication following heart surgery, resolved now   • Myocardial infarct 11/2006   • Anticoagulation monitoring, special range    • Breath shortness    • Hyperlipoproteinemia    • Hypertension    • Pacemaker     AICD   • Paroxysmal atrial fibrillation (CMS-HCC)    • Sleep apnea     CPAP   • " "Snoring        Surgical History  Past Surgical History:   Procedure Laterality Date   • AORTIC VALVE REPLACEMENT         • AORTIC VALVE REPLACEMENT      mechanical   • MITRAL VALVE REPAIR     • PACEMAKER INSERTION         Medications  No current facility-administered medications on file prior to encounter.      Current Outpatient Prescriptions on File Prior to Encounter   Medication Sig Dispense Refill   • warfarin (COUMADIN) 5 MG Tab Take one tablet by mouth once daily as instructed by [facility] anticoagulation services 90 Tab 1       Family History  Family History   Problem Relation Age of Onset   • Other Father      Hernia   • Heart Disease Father      Ascending Aortic Aneurysm Repair   • No Known Problems Mother    • No Known Problems Sister    • Heart Disease Maternal Grandmother      ?Open Heart Surgery   • Dementia Maternal Grandmother    • Heart Disease Paternal Grandmother      ?Open Heart Surgery (CABG)   • Alzheimer's Disease Paternal Grandmother    • Heart Disease Paternal Grandfather      ?Aortic Aneurysm   • Cancer Paternal Uncle      Prostate Cancer   Strong family history of heart disease.    Social History  Social History   Substance Use Topics   • Smoking status: Never Smoker   • Smokeless tobacco: Never Used   • Alcohol use Yes   Denies tobacco or illicit drug use. Occasional alcohol use.    Allergies  Allergies   Allergen Reactions   • Lactose Diarrhea     Pt states \"I get diarrhea and upset stomach\".          Physical Exam  Laboratory   Hemodynamics  Temp (24hrs), Av.2 °C (99 °F), Min:37.2 °C (99 °F), Max:37.2 °C (99 °F)   Temperature: 37.2 °C (99 °F)  Pulse  Av  Min: 61  Max: 104 Heart Rate (Monitored): 64  Blood Pressure: 149/84, NIBP: 142/76      Respiratory      Respiration: 19, Pulse Oximetry: 98 %             Physical Exam   Constitutional: He is oriented to person, place, and time. He appears well-developed. No distress.   HENT:   Mouth/Throat: Oropharynx is clear and moist. "   Eyes: EOM are normal. Pupils are equal, round, and reactive to light. Scleral icterus is present.   Neck: Normal range of motion. Neck supple. No JVD present.   Cardiovascular: Normal rate, regular rhythm and intact distal pulses.    Valve click; pacemaker on left chest and prior sternotomy scar well healed   Pulmonary/Chest: Effort normal. No respiratory distress. He has no wheezes. He has rales (at the bases).   Abdominal: Bowel sounds are normal. There is tenderness in the right upper quadrant and epigastric area. There is no rebound.   Tympanic abdomen   Musculoskeletal: He exhibits edema (trace LE edema).   Lymphadenopathy:     He has no cervical adenopathy.   Neurological: He is alert and oriented to person, place, and time.   Skin: Skin is warm and dry. No erythema.   Psychiatric: He has a normal mood and affect. His behavior is normal.   Nursing note and vitals reviewed.      Recent Labs      01/07/18   1015   WBC  6.1   RBC  4.50*   HEMOGLOBIN  13.9*   HEMATOCRIT  39.5*   MCV  87.8   MCH  30.9   MCHC  35.2   RDW  42.3   PLATELETCT  119*   MPV  9.3     Recent Labs      01/07/18   1015   SODIUM  139   POTASSIUM  4.3   CHLORIDE  105   CO2  25   GLUCOSE  118*   BUN  15   CREATININE  0.91   CALCIUM  8.7     Recent Labs      01/07/18   1015   ALTSGPT  66*   ASTSGOT  55*   ALKPHOSPHAT  58   TBILIRUBIN  3.9*   LIPASE  18   GLUCOSE  118*     Recent Labs      01/07/18   1015   APTT  49.3*   INR  3.02*     Recent Labs      01/07/18   1015   BNPBTYPENAT  63         Lab Results   Component Value Date    TROPONINI <0.02 01/07/2018     Urinalysis:    Lab Results  Component Value Date/Time   SPECGRAVITY 1.015 01/07/2018 1045   GLUCOSEUR Negative 01/07/2018 1045   KETONES Negative 01/07/2018 1045   NITRITE Negative 01/07/2018 1045        Imaging  US-GALLBLADDER   Final Result      1.  Cholelithiasis with gallbladder wall thickening. Findings are consistent with acute cholecystitis in the appropriate clinical setting.       2.  Hepatomegaly      3.  Prominent intrahepatic IVC and hepatic veins. Bidirectional portal venous flow. Findings are suggestive of volume overload. Valvular disease could have a similar appearance.      DX-CHEST-PORTABLE (1 VIEW)   Final Result      1.  Mild cardiomegaly.      2.  Increased perihilar interstitial markings are suggestive of interstitial edema. Atypical infection could have a similar appearance.      ECHOCARDIOGRAM COMP W/O CONT    (Results Pending)        Assessment/Plan     I anticipate this patient will require at least two midnights for appropriate medical management, necessitating inpatient admission.    * Acute cholecystitis- (present on admission)   Assessment & Plan    Presented without sepsis but is tachycardic. US gallbladder showed cholelithiasis with wall thickening.  - Gen Surg consulted, greatly appreciate their recs  - monitor on tele  - MIVF x1 L, limiting intake given that he is slightly fluid overloaded  - clear liquid diet today then NPO at midnight  - pain control  - continue with zosyn  - blood cultures and lactic acid pending        SOB (shortness of breath)- (present on admission)   Assessment & Plan    Mild interstitial markings on CXR with symptoms of PND. Fortunately, only trace LE edema and no JVD present. Last echo was done in Sept 2016 showed EF 60% and moderate mitral stenosis  - limit IVF as possible  - repeat echo        Chronic anticoagulation- (present on admission)   Assessment & Plan    pAFib and h/o mechanical aortic valve replacement. Currently therapeutic but there is the possibility of surgery in the AM. No signs of active bleeding.  - vit K 5mg IV x1  - repeat INR in AM        TRIP on CPAP- (present on admission)   Assessment & Plan    On CPAP at home. Wife will bring for this evening.  - RT to follow        Hypertension- (present on admission)   Assessment & Plan    Normotensive. Currently not on any home medications.  - continue to monitor  - labetalol IV  PRN            VTE prophylaxis: SCDs.

## 2018-01-07 NOTE — ED NOTES
Assumed care of pt at this specific time; no acute exacerbations in condition are noted since last evaluation.  Call light is within reach and pt is strongly encouraged to call for any assistance.

## 2018-01-07 NOTE — ED NOTES
"Pt presents w/ multiple complaints as progressed through Triage process including increasing short of breath and increasing abd pain \"for quite a while.\"  Pt currently c/o CP  "

## 2018-01-08 LAB
ALBUMIN SERPL BCP-MCNC: 3.4 G/DL (ref 3.2–4.9)
ALBUMIN/GLOB SERPL: 1.3 G/DL
ALP SERPL-CCNC: 47 U/L (ref 30–99)
ALT SERPL-CCNC: 51 U/L (ref 2–50)
ANION GAP SERPL CALC-SCNC: 8 MMOL/L (ref 0–11.9)
APTT PPP: 120.1 SEC (ref 24.7–36)
APTT PPP: 43.9 SEC (ref 24.7–36)
AST SERPL-CCNC: 37 U/L (ref 12–45)
BILIRUB SERPL-MCNC: 7 MG/DL (ref 0.1–1.5)
BUN SERPL-MCNC: 14 MG/DL (ref 8–22)
CALCIUM SERPL-MCNC: 8.2 MG/DL (ref 8.4–10.2)
CHLORIDE SERPL-SCNC: 104 MMOL/L (ref 96–112)
CO2 SERPL-SCNC: 25 MMOL/L (ref 20–33)
CREAT SERPL-MCNC: 1.12 MG/DL (ref 0.5–1.4)
ERYTHROCYTE [DISTWIDTH] IN BLOOD BY AUTOMATED COUNT: 43.6 FL (ref 35.9–50)
GFR SERPL CREATININE-BSD FRML MDRD: >60 ML/MIN/1.73 M 2
GLOBULIN SER CALC-MCNC: 2.6 G/DL (ref 1.9–3.5)
GLUCOSE SERPL-MCNC: 103 MG/DL (ref 65–99)
HCT VFR BLD AUTO: 36.2 % (ref 42–52)
HGB BLD-MCNC: 12.3 G/DL (ref 14–18)
INR PPP: 1.67 (ref 0.87–1.13)
LV EJECT FRACT  99904: 65
LV EJECT FRACT MOD 2C 99903: 73.29
LV EJECT FRACT MOD 4C 99902: 50.76
LV EJECT FRACT MOD BP 99901: 64.11
MCH RBC QN AUTO: 30.3 PG (ref 27–33)
MCHC RBC AUTO-ENTMCNC: 34 G/DL (ref 33.7–35.3)
MCV RBC AUTO: 89.2 FL (ref 81.4–97.8)
PLATELET # BLD AUTO: 120 K/UL (ref 164–446)
PMV BLD AUTO: 9.9 FL (ref 9–12.9)
POTASSIUM SERPL-SCNC: 4.2 MMOL/L (ref 3.6–5.5)
PROT SERPL-MCNC: 6 G/DL (ref 6–8.2)
PROTHROMBIN TIME: 19.2 SEC (ref 12–14.6)
RBC # BLD AUTO: 4.06 M/UL (ref 4.7–6.1)
SODIUM SERPL-SCNC: 137 MMOL/L (ref 135–145)
WBC # BLD AUTO: 6.8 K/UL (ref 4.8–10.8)

## 2018-01-08 PROCEDURE — 94760 N-INVAS EAR/PLS OXIMETRY 1: CPT

## 2018-01-08 PROCEDURE — 93306 TTE W/DOPPLER COMPLETE: CPT | Mod: 26 | Performed by: INTERNAL MEDICINE

## 2018-01-08 PROCEDURE — 700102 HCHG RX REV CODE 250 W/ 637 OVERRIDE(OP): Performed by: INTERNAL MEDICINE

## 2018-01-08 PROCEDURE — 700111 HCHG RX REV CODE 636 W/ 250 OVERRIDE (IP): Performed by: INTERNAL MEDICINE

## 2018-01-08 PROCEDURE — 99233 SBSQ HOSP IP/OBS HIGH 50: CPT | Performed by: INTERNAL MEDICINE

## 2018-01-08 PROCEDURE — A9270 NON-COVERED ITEM OR SERVICE: HCPCS | Performed by: HOSPITALIST

## 2018-01-08 PROCEDURE — 700111 HCHG RX REV CODE 636 W/ 250 OVERRIDE (IP): Performed by: HOSPITALIST

## 2018-01-08 PROCEDURE — 94660 CPAP INITIATION&MGMT: CPT

## 2018-01-08 PROCEDURE — 85610 PROTHROMBIN TIME: CPT

## 2018-01-08 PROCEDURE — 700105 HCHG RX REV CODE 258: Performed by: HOSPITALIST

## 2018-01-08 PROCEDURE — A9270 NON-COVERED ITEM OR SERVICE: HCPCS | Performed by: INTERNAL MEDICINE

## 2018-01-08 PROCEDURE — 770020 HCHG ROOM/CARE - TELE (206)

## 2018-01-08 PROCEDURE — 93306 TTE W/DOPPLER COMPLETE: CPT

## 2018-01-08 PROCEDURE — 700102 HCHG RX REV CODE 250 W/ 637 OVERRIDE(OP): Performed by: HOSPITALIST

## 2018-01-08 PROCEDURE — 85730 THROMBOPLASTIN TIME PARTIAL: CPT

## 2018-01-08 PROCEDURE — 80053 COMPREHEN METABOLIC PANEL: CPT

## 2018-01-08 PROCEDURE — 85027 COMPLETE CBC AUTOMATED: CPT

## 2018-01-08 RX ORDER — OSELTAMIVIR PHOSPHATE 75 MG/1
75 CAPSULE ORAL EVERY 12 HOURS
Status: COMPLETED | OUTPATIENT
Start: 2018-01-08 | End: 2018-01-12

## 2018-01-08 RX ORDER — HEPARIN SODIUM 1000 [USP'U]/ML
7000 INJECTION, SOLUTION INTRAVENOUS; SUBCUTANEOUS ONCE
Status: COMPLETED | OUTPATIENT
Start: 2018-01-08 | End: 2018-01-08

## 2018-01-08 RX ORDER — HEPARIN SODIUM 1000 [USP'U]/ML
3800 INJECTION, SOLUTION INTRAVENOUS; SUBCUTANEOUS PRN
Status: DISCONTINUED | OUTPATIENT
Start: 2018-01-08 | End: 2018-01-09

## 2018-01-08 RX ADMIN — HEPARIN SODIUM 1450 UNITS/HR: 5000 INJECTION, SOLUTION INTRAVENOUS at 22:31

## 2018-01-08 RX ADMIN — OSELTAMIVIR PHOSPHATE 75 MG: 75 CAPSULE ORAL at 20:58

## 2018-01-08 RX ADMIN — PIPERACILLIN AND TAZOBACTAM 3.38 G: 3; .375 INJECTION, POWDER, FOR SOLUTION INTRAVENOUS at 04:04

## 2018-01-08 RX ADMIN — HYDROMORPHONE HYDROCHLORIDE 0.25 MG: 1 INJECTION, SOLUTION INTRAMUSCULAR; INTRAVENOUS; SUBCUTANEOUS at 19:31

## 2018-01-08 RX ADMIN — OXYCODONE HYDROCHLORIDE 5 MG: 5 TABLET ORAL at 04:57

## 2018-01-08 RX ADMIN — HEPARIN SODIUM 1450 UNITS/HR: 5000 INJECTION, SOLUTION INTRAVENOUS at 14:51

## 2018-01-08 RX ADMIN — ACETAMINOPHEN 650 MG: 325 TABLET, FILM COATED ORAL at 14:22

## 2018-01-08 RX ADMIN — HYDROMORPHONE HYDROCHLORIDE 0.25 MG: 1 INJECTION, SOLUTION INTRAMUSCULAR; INTRAVENOUS; SUBCUTANEOUS at 16:26

## 2018-01-08 RX ADMIN — PIPERACILLIN AND TAZOBACTAM 3.38 G: 3; .375 INJECTION, POWDER, FOR SOLUTION INTRAVENOUS at 14:06

## 2018-01-08 RX ADMIN — HEPARIN SODIUM 7000 UNITS: 1000 INJECTION, SOLUTION INTRAVENOUS; SUBCUTANEOUS at 14:43

## 2018-01-08 RX ADMIN — HYDROMORPHONE HYDROCHLORIDE 0.25 MG: 1 INJECTION, SOLUTION INTRAMUSCULAR; INTRAVENOUS; SUBCUTANEOUS at 12:30

## 2018-01-08 RX ADMIN — HYDROMORPHONE HYDROCHLORIDE 0.25 MG: 1 INJECTION, SOLUTION INTRAMUSCULAR; INTRAVENOUS; SUBCUTANEOUS at 08:14

## 2018-01-08 RX ADMIN — STANDARDIZED SENNA CONCENTRATE AND DOCUSATE SODIUM 2 TABLET: 8.6; 5 TABLET, FILM COATED ORAL at 20:59

## 2018-01-08 RX ADMIN — PIPERACILLIN AND TAZOBACTAM 3.38 G: 3; .375 INJECTION, POWDER, FOR SOLUTION INTRAVENOUS at 20:58

## 2018-01-08 RX ADMIN — OSELTAMIVIR PHOSPHATE 75 MG: 75 CAPSULE ORAL at 08:23

## 2018-01-08 RX ADMIN — HYDROMORPHONE HYDROCHLORIDE 0.25 MG: 1 INJECTION, SOLUTION INTRAMUSCULAR; INTRAVENOUS; SUBCUTANEOUS at 22:32

## 2018-01-08 ASSESSMENT — PAIN SCALES - GENERAL
PAINLEVEL_OUTOF10: 5
PAINLEVEL_OUTOF10: 6
PAINLEVEL_OUTOF10: 7
PAINLEVEL_OUTOF10: 2
PAINLEVEL_OUTOF10: 0
PAINLEVEL_OUTOF10: 8
PAINLEVEL_OUTOF10: 6
PAINLEVEL_OUTOF10: 2

## 2018-01-08 ASSESSMENT — ENCOUNTER SYMPTOMS
VOMITING: 0
SHORTNESS OF BREATH: 0
DIARRHEA: 0
FOCAL WEAKNESS: 0
HEADACHES: 0
DOUBLE VISION: 0
FEVER: 1
BLURRED VISION: 0
ROS SKIN COMMENTS: JAUNDICE
CHILLS: 1
ABDOMINAL PAIN: 1
DIZZINESS: 0
COUGH: 1
NAUSEA: 0

## 2018-01-08 NOTE — PROGRESS NOTES
Gave bedside report to NOC RNMartita.  Discussed POC.  Pt resting in bed, semi-fowlers, no s/s of acute distress, respirations even and mild labored, on 2 L of oxygen via NC, pt's spouse at bedside, personal belongings w/in reach, safety precautions in place.

## 2018-01-08 NOTE — PROGRESS NOTES
Received call from lab w/critical result, pt is influenza B +.      Called Dr. Ziegler and informed her, no new orders received.

## 2018-01-08 NOTE — PROGRESS NOTES
[facility] Hospitalist Progress Note    Date of Service: 2018    Chief Complaint  47 y.o. male admitted 2018 with CVA, CAD, HTN, apfib, pacemaker, TRIP who presented with acute cholecystitis and influenza B.     Interval Problem Update  Continues to have RUQ pain, on Zosyn. Cholecystectomy with Dr. Mcallister tomorrow.  CXR with edema. Hypoxia on 3-4L O2. On Tamiflu  TTE pending  INR 1.67 after vit K. Heparin bridge for mech valve until surgery.    Consultants/Specialty  General surgery    Disposition  Home pending cholecystectomy        Review of Systems   Constitutional: Positive for chills and fever.   Eyes: Negative for blurred vision and double vision.   Respiratory: Positive for cough. Negative for shortness of breath.    Cardiovascular: Negative for chest pain and leg swelling.   Gastrointestinal: Positive for abdominal pain. Negative for diarrhea, nausea and vomiting.   Genitourinary: Negative for dysuria.        Dark urine   Skin:        jaundice   Neurological: Negative for dizziness, focal weakness and headaches.      Physical Exam  Laboratory/Imaging   Hemodynamics  Temp (24hrs), Av.5 °C (99.5 °F), Min:36.9 °C (98.4 °F), Max:38.4 °C (101.2 °F)   Temperature: (!) 38.4 °C (101.2 °F)  Pulse  Av.6  Min: 59  Max: 104 Heart Rate (Monitored): 70  Blood Pressure: 145/72      Respiratory      Respiration: 18, Pulse Oximetry: 95 %, O2 Daily Delivery Respiratory :  (bleed in to cpap)        RUL Breath Sounds: Clear, RML Breath Sounds: Clear;Diminished, RLL Breath Sounds: Diminished, DELORES Breath Sounds: Clear, LLL Breath Sounds: Diminished    Fluids    Intake/Output Summary (Last 24 hours) at 18 1356  Last data filed at 18 0400   Gross per 24 hour   Intake              746 ml   Output                0 ml   Net              746 ml       Nutrition  Orders Placed This Encounter   Procedures   • DIET ORDER     Standing Status:   Standing     Number of Occurrences:   1     Order Specific Question:    Diet:     Answer:   Clear Liquid [10]   • DIET NPO     Standing Status:   Standing     Number of Occurrences:   1     Order Specific Question:   Restrict to:     Answer:   Strict [1]     Physical Exam   Constitutional: He is oriented to person, place, and time. He appears well-developed and well-nourished. He is cooperative.   HENT:   Head: Normocephalic and atraumatic.   Eyes: Conjunctivae and EOM are normal. Scleral icterus is present.   Cardiovascular: Normal rate and regular rhythm.    Systolic click   Pulmonary/Chest: Effort normal. He has no wheezes. He has no rales.   Decreased breath sound at bases   Abdominal: Soft. Bowel sounds are normal. There is no rebound and no guarding.   RUQ tenderness   Musculoskeletal: He exhibits no edema.   Neurological: He is alert and oriented to person, place, and time.   Skin: Skin is warm and dry.   Nursing note and vitals reviewed.      Recent Labs      01/07/18   1015  01/08/18   0450   WBC  6.1  6.8   RBC  4.50*  4.06*   HEMOGLOBIN  13.9*  12.3*   HEMATOCRIT  39.5*  36.2*   MCV  87.8  89.2   MCH  30.9  30.3   MCHC  35.2  34.0   RDW  42.3  43.6   PLATELETCT  119*  120*   MPV  9.3  9.9     Recent Labs      01/07/18   1015  01/08/18   0450   SODIUM  139  137   POTASSIUM  4.3  4.2   CHLORIDE  105  104   CO2  25  25   GLUCOSE  118*  103*   BUN  15  14   CREATININE  0.91  1.12   CALCIUM  8.7  8.2*     Recent Labs      01/07/18   1015  01/08/18   0450   APTT  49.3*   --    INR  3.02*  1.67*     Recent Labs      01/07/18   1015   BNPBTYPENAT  63              Assessment/Plan     * Acute cholecystitis- (present on admission)   Assessment & Plan    Rising LFTs. US gallbladder showed cholelithiasis with wall thickening. No signs of sepsis.  - continue zosyn  - cholecystectomy with Dr. Mcallister tomorrow 6pm  - clear diet, NPO after breakfast tomorrow  - pain control  - blood cultures pending        SOB (shortness of breath)- (present on admission)   Assessment & Plan    Mild  interstitial markings on CXR with symptoms of PND. Only trace LE edema and no JVD present. Last echo was done in Sept 2016 showed EF 60% and moderate mitral stenosis. Influenza B is positive.  - limit IVF as possible  - repeat echo  - tamiflu        Chronic anticoagulation- (present on admission)   Assessment & Plan    pAFib and h/o mechanical aortic valve replacement. INR goal 2.5-3.5. But plans for surgery tomorrow night. INR 1.6 after vit K.   - given risks of thrombosis with mech valve and pAfib, will bridge on heparin until surgery tomorrow        Influenza B- (present on admission)   Assessment & Plan    Positive swab. Symptoms have been improving, per patient.   - given hypoxia, started on tamiflu        TRIP on CPAP- (present on admission)   Assessment & Plan    Home CPAP at bedside.        Hypertension- (present on admission)   Assessment & Plan    Normotensive. Currently not on any home medications.  - continue to monitor  - labetalol IV PRN            Reviewed items::  EKG reviewed, Labs reviewed, Medications reviewed and Radiology images reviewed  Bhatia catheter::  No Bhatia  DVT prophylaxis pharmacological::  Not indicated at this time, ambulatory  DVT prophylaxis - mechanical:  SCDs

## 2018-01-08 NOTE — PROGRESS NOTES
Report received from Day RN, assumed care of pt at this time. POC and medications reviewed with pt. Pt verbalized understanding. Pt c/o 4/10 pain to mid back. Will medicate per MAR. Pt denies SOB or dizziness at this time. Safety measures in place. Will continue to monitor.

## 2018-01-08 NOTE — PROGRESS NOTES
Appears to be candidate for cholecystectomy   Will need Intraoperative cholangiography   Elevated and increasing  bilirubin   Probably tomorrow   Will see preop

## 2018-01-08 NOTE — FLOWSHEET NOTE
01/08/18 0401   Chest Exam   Respiration 18   Heart Rate (Monitored) 70   Oximetry   #Pulse Oximetry (Single Determination) Yes   Oxygen   Home O2 Use Prior To Admission? No   Pulse Oximetry 95 %   O2 (LPM) 4  (weaned down to 3L, RN is aware)   PAtient remains on CPAP, patient demonstrates ability to remove mask if they need to

## 2018-01-08 NOTE — CARE PLAN
Problem: Nutritional:  Goal: Achieve adequate nutritional intake  Diet advancement   Outcome: PROGRESSING AS EXPECTED

## 2018-01-08 NOTE — CARE PLAN
Problem: Venous Thromboembolism (VTW)/Deep Vein Thrombosis (DVT) Prevention:  Goal: Patient will participate in Venous Thrombosis (VTE)/Deep Vein Thrombosis (DVT)Prevention Measures  Outcome: PROGRESSING AS EXPECTED      Problem: Pain Management  Goal: Pain level will decrease to patient's comfort goal  Outcome: PROGRESSING AS EXPECTED  Pt's pain assessed. Pt medicated for pain per MAR. Will reassess pain level.

## 2018-01-09 ENCOUNTER — APPOINTMENT (OUTPATIENT)
Dept: RADIOLOGY | Facility: MEDICAL CENTER | Age: 48
DRG: 987 | End: 2018-01-09
Attending: HOSPITALIST
Payer: COMMERCIAL

## 2018-01-09 LAB
ALBUMIN SERPL BCP-MCNC: 3.5 G/DL (ref 3.2–4.9)
ALBUMIN/GLOB SERPL: 1 G/DL
ALP SERPL-CCNC: 43 U/L (ref 30–99)
ALT SERPL-CCNC: 45 U/L (ref 2–50)
ANION GAP SERPL CALC-SCNC: 9 MMOL/L (ref 0–11.9)
APTT PPP: 64.6 SEC (ref 24.7–36)
APTT PPP: 96.6 SEC (ref 24.7–36)
AST SERPL-CCNC: 37 U/L (ref 12–45)
BASOPHILS # BLD AUTO: 0.4 % (ref 0–1.8)
BASOPHILS # BLD: 0.03 K/UL (ref 0–0.12)
BILIRUB SERPL-MCNC: 9.1 MG/DL (ref 0.1–1.5)
BNP SERPL-MCNC: 108 PG/ML (ref 0–100)
BUN SERPL-MCNC: 19 MG/DL (ref 8–22)
CALCIUM SERPL-MCNC: 8.3 MG/DL (ref 8.4–10.2)
CHLORIDE SERPL-SCNC: 101 MMOL/L (ref 96–112)
CO2 SERPL-SCNC: 24 MMOL/L (ref 20–33)
CREAT SERPL-MCNC: 0.82 MG/DL (ref 0.5–1.4)
EOSINOPHIL # BLD AUTO: 0.03 K/UL (ref 0–0.51)
EOSINOPHIL NFR BLD: 0.4 % (ref 0–6.9)
ERYTHROCYTE [DISTWIDTH] IN BLOOD BY AUTOMATED COUNT: 42.5 FL (ref 35.9–50)
GLOBULIN SER CALC-MCNC: 3.6 G/DL (ref 1.9–3.5)
GLUCOSE SERPL-MCNC: 116 MG/DL (ref 65–99)
HCT VFR BLD AUTO: 34.2 % (ref 42–52)
HGB BLD-MCNC: 12 G/DL (ref 14–18)
IMM GRANULOCYTES # BLD AUTO: 0.2 K/UL (ref 0–0.11)
IMM GRANULOCYTES NFR BLD AUTO: 2.6 % (ref 0–0.9)
INR PPP: 1.4 (ref 0.87–1.13)
LYMPHOCYTES # BLD AUTO: 0.7 K/UL (ref 1–4.8)
LYMPHOCYTES NFR BLD: 9.2 % (ref 22–41)
MCH RBC QN AUTO: 30.8 PG (ref 27–33)
MCHC RBC AUTO-ENTMCNC: 35.1 G/DL (ref 33.7–35.3)
MCV RBC AUTO: 87.7 FL (ref 81.4–97.8)
MONOCYTES # BLD AUTO: 0.8 K/UL (ref 0–0.85)
MONOCYTES NFR BLD AUTO: 10.5 % (ref 0–13.4)
NEUTROPHILS # BLD AUTO: 5.85 K/UL (ref 1.82–7.42)
NEUTROPHILS NFR BLD: 76.9 % (ref 44–72)
NRBC # BLD AUTO: 0 K/UL
NRBC BLD-RTO: 0 /100 WBC
PATHOLOGY CONSULT NOTE: NORMAL
PLATELET # BLD AUTO: 117 K/UL (ref 164–446)
PMV BLD AUTO: 9.7 FL (ref 9–12.9)
POTASSIUM SERPL-SCNC: 4.2 MMOL/L (ref 3.6–5.5)
PROT SERPL-MCNC: 7.1 G/DL (ref 6–8.2)
PROTHROMBIN TIME: 16.7 SEC (ref 12–14.6)
RBC # BLD AUTO: 3.9 M/UL (ref 4.7–6.1)
SODIUM SERPL-SCNC: 134 MMOL/L (ref 135–145)
WBC # BLD AUTO: 7.6 K/UL (ref 4.8–10.8)

## 2018-01-09 PROCEDURE — 99233 SBSQ HOSP IP/OBS HIGH 50: CPT | Performed by: HOSPITALIST

## 2018-01-09 PROCEDURE — 501577 HCHG TROCAR, STEP 11MM: Performed by: SURGERY

## 2018-01-09 PROCEDURE — 700111 HCHG RX REV CODE 636 W/ 250 OVERRIDE (IP): Performed by: INTERNAL MEDICINE

## 2018-01-09 PROCEDURE — 700111 HCHG RX REV CODE 636 W/ 250 OVERRIDE (IP): Performed by: HOSPITALIST

## 2018-01-09 PROCEDURE — 700102 HCHG RX REV CODE 250 W/ 637 OVERRIDE(OP): Performed by: INTERNAL MEDICINE

## 2018-01-09 PROCEDURE — 85025 COMPLETE CBC W/AUTO DIFF WBC: CPT

## 2018-01-09 PROCEDURE — 94660 CPAP INITIATION&MGMT: CPT

## 2018-01-09 PROCEDURE — 80053 COMPREHEN METABOLIC PANEL: CPT

## 2018-01-09 PROCEDURE — 700102 HCHG RX REV CODE 250 W/ 637 OVERRIDE(OP): Performed by: HOSPITALIST

## 2018-01-09 PROCEDURE — 88304 TISSUE EXAM BY PATHOLOGIST: CPT

## 2018-01-09 PROCEDURE — 700105 HCHG RX REV CODE 258: Performed by: HOSPITALIST

## 2018-01-09 PROCEDURE — A9270 NON-COVERED ITEM OR SERVICE: HCPCS | Performed by: INTERNAL MEDICINE

## 2018-01-09 PROCEDURE — 94760 N-INVAS EAR/PLS OXIMETRY 1: CPT

## 2018-01-09 PROCEDURE — 501583 HCHG TROCAR, THRD CAN&SEAL 5X100: Performed by: SURGERY

## 2018-01-09 PROCEDURE — 160039 HCHG SURGERY MINUTES - EA ADDL 1 MIN LEVEL 3: Performed by: SURGERY

## 2018-01-09 PROCEDURE — 85610 PROTHROMBIN TIME: CPT

## 2018-01-09 PROCEDURE — 700111 HCHG RX REV CODE 636 W/ 250 OVERRIDE (IP)

## 2018-01-09 PROCEDURE — 502571 HCHG PACK, LAP CHOLE: Performed by: SURGERY

## 2018-01-09 PROCEDURE — 500800 HCHG LAPAROSCOPIC J/L HOOK: Performed by: SURGERY

## 2018-01-09 PROCEDURE — 501838 HCHG SUTURE GENERAL: Performed by: SURGERY

## 2018-01-09 PROCEDURE — A9270 NON-COVERED ITEM OR SERVICE: HCPCS | Performed by: HOSPITALIST

## 2018-01-09 PROCEDURE — 160028 HCHG SURGERY MINUTES - 1ST 30 MINS LEVEL 3: Performed by: SURGERY

## 2018-01-09 PROCEDURE — 160035 HCHG PACU - 1ST 60 MINS PHASE I: Performed by: SURGERY

## 2018-01-09 PROCEDURE — 83880 ASSAY OF NATRIURETIC PEPTIDE: CPT

## 2018-01-09 PROCEDURE — 85730 THROMBOPLASTIN TIME PARTIAL: CPT

## 2018-01-09 PROCEDURE — 160048 HCHG OR STATISTICAL LEVEL 1-5: Performed by: SURGERY

## 2018-01-09 PROCEDURE — 501572 HCHG TROCAR, SHIELD OBTU 5X100: Performed by: SURGERY

## 2018-01-09 PROCEDURE — 770022 HCHG ROOM/CARE - ICU (200)

## 2018-01-09 PROCEDURE — 0FT44ZZ RESECTION OF GALLBLADDER, PERCUTANEOUS ENDOSCOPIC APPROACH: ICD-10-PCS | Performed by: SURGERY

## 2018-01-09 PROCEDURE — 500002 HCHG ADHESIVE, DERMABOND: Performed by: SURGERY

## 2018-01-09 PROCEDURE — 160009 HCHG ANES TIME/MIN: Performed by: SURGERY

## 2018-01-09 PROCEDURE — 160002 HCHG RECOVERY MINUTES (STAT): Performed by: SURGERY

## 2018-01-09 PROCEDURE — 700101 HCHG RX REV CODE 250

## 2018-01-09 PROCEDURE — 71045 X-RAY EXAM CHEST 1 VIEW: CPT

## 2018-01-09 PROCEDURE — A6402 STERILE GAUZE <= 16 SQ IN: HCPCS | Performed by: SURGERY

## 2018-01-09 PROCEDURE — 500697 HCHG HEMOCLIP, LARGE (ORANGE): Performed by: SURGERY

## 2018-01-09 RX ORDER — OXYCODONE HYDROCHLORIDE 5 MG/1
5 TABLET ORAL
Status: DISCONTINUED | OUTPATIENT
Start: 2018-01-09 | End: 2018-01-14 | Stop reason: HOSPADM

## 2018-01-09 RX ORDER — FUROSEMIDE 10 MG/ML
INJECTION INTRAMUSCULAR; INTRAVENOUS
Status: ACTIVE
Start: 2018-01-09 | End: 2018-01-09

## 2018-01-09 RX ORDER — OXYCODONE HYDROCHLORIDE 5 MG/1
2.5 TABLET ORAL
Status: DISCONTINUED | OUTPATIENT
Start: 2018-01-09 | End: 2018-01-14 | Stop reason: HOSPADM

## 2018-01-09 RX ORDER — BUPIVACAINE HYDROCHLORIDE AND EPINEPHRINE 2.5; 5 MG/ML; UG/ML
INJECTION, SOLUTION INFILTRATION; PERINEURAL
Status: DISCONTINUED | OUTPATIENT
Start: 2018-01-09 | End: 2018-01-09 | Stop reason: HOSPADM

## 2018-01-09 RX ORDER — FUROSEMIDE 10 MG/ML
40 INJECTION INTRAMUSCULAR; INTRAVENOUS ONCE
Status: COMPLETED | OUTPATIENT
Start: 2018-01-09 | End: 2018-01-09

## 2018-01-09 RX ADMIN — HEPARIN SODIUM 1250 UNITS/HR: 5000 INJECTION, SOLUTION INTRAVENOUS at 08:36

## 2018-01-09 RX ADMIN — HYDROMORPHONE HYDROCHLORIDE 0.5 MG: 1 INJECTION, SOLUTION INTRAMUSCULAR; INTRAVENOUS; SUBCUTANEOUS at 20:48

## 2018-01-09 RX ADMIN — PIPERACILLIN AND TAZOBACTAM 3.38 G: 3; .375 INJECTION, POWDER, FOR SOLUTION INTRAVENOUS at 05:03

## 2018-01-09 RX ADMIN — HYDROMORPHONE HYDROCHLORIDE 0.25 MG: 1 INJECTION, SOLUTION INTRAMUSCULAR; INTRAVENOUS; SUBCUTANEOUS at 08:40

## 2018-01-09 RX ADMIN — VANCOMYCIN HYDROCHLORIDE 2800 MG: 10 INJECTION, POWDER, LYOPHILIZED, FOR SOLUTION INTRAVENOUS at 22:00

## 2018-01-09 RX ADMIN — HYDROMORPHONE HYDROCHLORIDE 0.25 MG: 1 INJECTION, SOLUTION INTRAMUSCULAR; INTRAVENOUS; SUBCUTANEOUS at 05:04

## 2018-01-09 RX ADMIN — PIPERACILLIN AND TAZOBACTAM 3.38 G: 3; .375 INJECTION, POWDER, FOR SOLUTION INTRAVENOUS at 12:46

## 2018-01-09 RX ADMIN — FUROSEMIDE 40 MG: 10 INJECTION, SOLUTION INTRAVENOUS at 10:22

## 2018-01-09 RX ADMIN — OSELTAMIVIR PHOSPHATE 75 MG: 75 CAPSULE ORAL at 08:31

## 2018-01-09 RX ADMIN — POLYETHYLENE GLYCOL 3350 1 PACKET: 17 POWDER, FOR SOLUTION ORAL at 05:17

## 2018-01-09 RX ADMIN — HYDROMORPHONE HYDROCHLORIDE 0.25 MG: 1 INJECTION, SOLUTION INTRAMUSCULAR; INTRAVENOUS; SUBCUTANEOUS at 14:57

## 2018-01-09 RX ADMIN — OSELTAMIVIR PHOSPHATE 75 MG: 75 CAPSULE ORAL at 21:21

## 2018-01-09 RX ADMIN — PIPERACILLIN AND TAZOBACTAM 3.38 G: 3; .375 INJECTION, POWDER, FOR SOLUTION INTRAVENOUS at 21:21

## 2018-01-09 RX ADMIN — HYDROMORPHONE HYDROCHLORIDE 0.25 MG: 1 INJECTION, SOLUTION INTRAMUSCULAR; INTRAVENOUS; SUBCUTANEOUS at 01:57

## 2018-01-09 RX ADMIN — ALBUTEROL SULFATE 2.5 MG: 2.5 SOLUTION RESPIRATORY (INHALATION) at 20:00

## 2018-01-09 RX ADMIN — HYDROMORPHONE HYDROCHLORIDE 0.25 MG: 1 INJECTION, SOLUTION INTRAMUSCULAR; INTRAVENOUS; SUBCUTANEOUS at 11:48

## 2018-01-09 RX ADMIN — HYDROMORPHONE HYDROCHLORIDE 0.5 MG: 1 INJECTION, SOLUTION INTRAMUSCULAR; INTRAVENOUS; SUBCUTANEOUS at 23:41

## 2018-01-09 ASSESSMENT — PAIN SCALES - GENERAL
PAINLEVEL_OUTOF10: 4
PAINLEVEL_OUTOF10: 5
PAINLEVEL_OUTOF10: ASSUMED PAIN PRESENT
PAINLEVEL_OUTOF10: 8
PAINLEVEL_OUTOF10: 4
PAINLEVEL_OUTOF10: 8
PAINLEVEL_OUTOF10: 4
PAINLEVEL_OUTOF10: 2
PAINLEVEL_OUTOF10: 8
PAINLEVEL_OUTOF10: 4

## 2018-01-09 ASSESSMENT — ENCOUNTER SYMPTOMS
VOMITING: 0
DIZZINESS: 0
DIARRHEA: 0
PALPITATIONS: 0
HEADACHES: 0
COUGH: 1
BLURRED VISION: 0
ABDOMINAL PAIN: 1
NAUSEA: 0
FOCAL WEAKNESS: 0
SHORTNESS OF BREATH: 1
FEVER: 0

## 2018-01-09 ASSESSMENT — PATIENT HEALTH QUESTIONNAIRE - PHQ9
3. TROUBLE FALLING OR STAYING ASLEEP OR SLEEPING TOO MUCH: NOT AT ALL
SUM OF ALL RESPONSES TO PHQ9 QUESTIONS 1 AND 2: 0
2. FEELING DOWN, DEPRESSED, IRRITABLE, OR HOPELESS: NOT AT ALL
1. LITTLE INTEREST OR PLEASURE IN DOING THINGS: NOT AT ALL
SUM OF ALL RESPONSES TO PHQ QUESTIONS 1-9: 0

## 2018-01-09 NOTE — FLOWSHEET NOTE
01/08/18 1930   Events/Summary/Plan   Events/Summary/Plan (reservoir on home cpap refilled-pt placed back:nasal cannula)   General Vent Information   Pulse Oximetry 96 %   CPAP/BiPAP TRIP Group   Nocturnal CPAP or BiPAP CPAP   #System Evaluation Yes  (reservoir refilled with sterile H20)   Home Unit Used? Yes   Equipment Inspected for Cleanliness, Operation, and Safety? Yes   Settings (If Known) 10-15   FiO2 or LPM 3   Home Mask Used? Yes   Chest Exam   Work Of Breathing / Effort Mild

## 2018-01-09 NOTE — CONSULTS
DATE OF SERVICE:  01/08/2018    REASON FOR CONSULTATION:  Acute cholecystitis.    HISTORY OF PRESENT ILLNESS:  The patient is a 47-year-old male.  He has had   somewhat chronic and exacerbating abdominal pain for the last year.  Over the   last month, he has had significant postprandial pain radiating into the right   upper quadrant associated with feeling of early satiety and bloating.  Over   the last several days, patient has noted dark urine.  He developed fever over   the weekend and had marked increase in his right upper quadrant abdominal pain   ultimately leading him to be evaluated and admitted from the emergency room   yesterday.  At that time, he was found to have jaundice and also an elevated   bilirubin, the bilirubin has actually gone up overnight.  He had mild   elevation of his liver function tests.  His ultrasound demonstrated gallstones   and a thickened gallbladder wall consistent with acute cholecystitis.  He did   not have intrahepatic or extrahepatic biliary dilatation.  He does not have   evidence of pancreatitis.  The patient is felt to be a candidate for   laparoscopic cholecystectomy, he was anticoagulated with Coumadin and this has   been reversed partially with administration of vitamin K and should normalize   tomorrow permitting surgical intervention.    PAST MEDICAL HISTORY:  He has not had previous abdominal surgeries.  His past   medical history is significant for organic heart disease.  He had an ascending   aortic aneurysm and an aortic valve and mitral valve repair several years   ago.  This was complicated by complete heart block and he now has a pacemaker.    The patient has had a normal ejection fraction.  He did have an   echocardiogram today, which continues to show an ejection fraction of 65%.  He   moved to this area several years ago and has had shortness of breath at this   elevation, which seems to be getting worse.  He does have ankle edema.  His   gallbladder  ultrasound showed dilatation of the inferior vena cava without   respiratory variation and bidirectional flow in both cava and the portal vein   which was consistent with possible volume overload or cardiac disease.    Echocardiogram did show moderate tricuspid regurgitation today with somewhat   elevated systolic pressure estimated in the right ventricle which is dilated.    This certainly is suggestive of pulmonary hypertension.    The patient has not had coronary disease to his knowledge.  The patient denies   other active medical problems.    ALLERGIES:  HE IS NOT KNOWN TO BE ALLERGIC TO MEDICATIONS, BUT IS LACTOSE   INTOLERANT.    MEDICATIONS:  The patient's medications at the time of admission included   Coumadin, and naproxen.    SOCIAL HISTORY:  He is  and his wife is accompanying him this evening.    REVIEW OF SYSTEMS:  Patient's review of systems is otherwise negative except   as noted above.  He apparently did have a recent upper respiratory infection   and has tested positive for influenza.    FAMILY HISTORY:  He has a family history of gallstones.    PHYSICAL EXAMINATION:  GENERAL:  Currently shows him to be afebrile.  VITAL SIGNS:  Normal.  HEENT:  Not remarkable.  He does have an element of scleral icterus.  NECK:  Supple.  There is no thyromegaly, no palpable lymphadenopathy.  LUNGS:  Clear without rales.  He has had a previous median sternotomy.  He has   mechanical valve click on auscultation.  ABDOMEN:  The patient's abdomen is obese.  He is tender to deep palpation in   the right upper quadrant.  There are no peritoneal findings.  No surgical   scars or hernias.  EXTREMITIES:  Do show ankle edema.  There are no other deformities.  NEUROLOGIC:  He is intact.  His cognition is normal.    LABORATORY DATA:  His laboratories feature normal white count.  His hemoglobin   is in the 12 gram range.  His platelet count is slightly low at 120,000 and   his liver function tests are notable for a  bilirubin of 7 and mild elevation   of his liver function tests.  The patient has normal renal function   essentially.  He is being somewhat fluid restricted and his creatinine went up   slightly this morning.    Chest x-ray does not show significant evidence of pulmonary edema.  He has had   previous cardiac surgery and has a pacemaker.    IMPRESSION:  At this time is acute cholecystitis with cholelithiasis.  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  He is a   candidate, I believe, for laparoscopic cholecystectomy once his INR has been   corrected and he should undergo intraoperative cholangiography to make sure   there is no choledocholithiasis.  The risks, possible complications of such   operation were carefully explained to him in detail.  We specifically   discussed generalized complications including respiratory problems, cardiac   problems, and kidney problems.  There will be a transient period where he will   not be anticoagulated and this may expose him to some risk of stroke.  In   addition, there is an increased risk of bleeding in the setting of acute   cholecystitis and he may not tolerate that as well as he should if there is an   element of right heart insufficiency.  The patient understands there could be   bile leaks or injury to the common bile duct, which are both increased in   this setting.  Nevertheless, I think he is a suitable candidate to proceed and   certainly surgical intervention is warranted.  I appreciate the opportunity   to be involved in the patient's care and help from the hospitalist medical   service.       ____________________________________     MD HALEY CALDERA / TACHO    DD:  01/08/2018 19:28:35  DT:  01/08/2018 23:14:47    D#:  [id number]  Job#:  [id number]    cc: Dr. Ziegler

## 2018-01-09 NOTE — DISCHARGE PLANNING
Medical Social Work    Referral: BROCK reviewed the chart this AM.      Intervention: Per flowsheet, pt lives with spouse and expects to d.c home.  Pt does not have home O2 and is currently on 4 LO2.  Therapies have not evaluated at the time of this note.  Based upon this review and information, there are no SS or DC needs identified at this time.      Plan: BROCK Freedy available to assist with any d.c planning.

## 2018-01-09 NOTE — CARE PLAN
Problem: Communication  Goal: The ability to communicate needs accurately and effectively will improve    Intervention: [place] patient and significant other/support system to call light to alert staff of needs   01/09/18 1403   OTHER   Oriented to: All of the Following : Location of Bathroom, Visiting Policy, Unit Routine, Call Light and Bedside Controls, Bedside Rail Policy, Smoking Policy, Rights and Responsibilities, Bedside Report, and Patient Education Notebook

## 2018-01-09 NOTE — FLOWSHEET NOTE
01/09/18 0045   Chest Exam   Work Of Breathing / Effort Mild   Respiration 18   Pulse 65   Oximetry   #Pulse Oximetry (Single Determination) Yes   Oxygen   Home O2 Use Prior To Admission? No   Pulse Oximetry 94 %   O2 (LPM) 3   O2 Daily Delivery Respiratory  Silicone Nasal Cannula

## 2018-01-09 NOTE — FLOWSHEET NOTE
01/09/18 0300   Events/Summary/Plan   Events/Summary/Plan patient back in bed with home cpap on face   General Vent Information   Pulse Oximetry 93 %   CPAP/BiPAP TRIP Group   Nocturnal CPAP or BiPAP CPAP   Home Unit Used? Yes   Settings (If Known) 10-15   FiO2 or LPM 4   Home Mask Used? Yes   Chest Exam   Work Of Breathing / Effort Mild

## 2018-01-09 NOTE — DISCHARGE PLANNING
Care Transition Team Assessment    Patient resides at home with his spouse and the discharge plan is for him to return home when medically able. No current SS needs noted.     Information Source  Orientation : Oriented x 4  Information Given By: Patient  Informant's Name: Shailesh  Who is responsible for making decisions for patient? : Patient    Readmission Evaluation  Is this a readmission?: No    Elopement Risk  Legal Hold: No  Ambulatory or Self Mobile in Wheelchair: Yes  Disoriented: No  Psychiatric Symptoms: None  History of Wandering: No  Elopement this Admit: No  Vocalizing Wanting to Leave: No  Displays Behaviors, Body Language Wanting to Leave: No-Not at Risk for Elopement  Elopement Risk: Not at Risk for Elopement    Interdisciplinary Discharge Planning  Does Admitting Nurse Feel This Could be a Complex Discharge?: No  Primary Care Physician: sheri Chaparro Chi  Lives with - Patient's Self Care Capacity: Spouse  Patient or legal guardian wants to designate a caregiver (see row info): No  Support Systems: Spouse / Significant Other  Housing / Facility: [address]  Do You Take your Prescribed Medications Regularly: Yes  Able to Return to Previous ADL's: Yes  Mobility Issues: No  Patient Expects to be Discharged to:: home    Discharge Preparedness  What is your plan after discharge?: Home with help  What are your discharge supports?: Spouse  Prior Functional Level: Independent with Activities of Daily Living    Functional Assesment  Prior Functional Level: Independent with Activities of Daily Living    Finances  Financial Barriers to Discharge: No  Prescription Coverage: Yes    Vision / Hearing Impairment  Vision Impairment : Yes  Right Eye Vision: Wears Glasses, Impaired  Left Eye Vision: Wears Glasses, Impaired  Hearing Impairment : No    Values / Beliefs / Concerns  Values / Beliefs Concerns : No    Advance Directive  Advance Directive?: None    Domestic Abuse  Have you ever been the victim of abuse or  violence?: No  Physical Abuse or Sexual Abuse: No  Verbal Abuse or Emotional Abuse: No    Psychological Assessment  History of Substance Abuse: None  History of Psychiatric Problems: No    Discharge Risks or Barriers  Discharge risks or barriers?: No    Anticipated Discharge Information  Anticipated discharge disposition: Home

## 2018-01-09 NOTE — FLOWSHEET NOTE
01/09/18 0055   General Vent Information   Pulse Oximetry 92 %   CPAP/BiPAP TRIP Group   Nocturnal CPAP or BiPAP CPAP   Home Unit Used? Yes   Settings (If Known) 10-15   FiO2 or LPM 4   Home Mask Used? Yes   Chest Exam   Work Of Breathing / Effort Mild

## 2018-01-09 NOTE — CARE PLAN
Problem: Safety  Goal: Will remain free from injury    Intervention: Provide assistance with mobility   01/09/18 1403   OTHER   Assistance / Tolerance Standby Assist;Tolerates Well

## 2018-01-09 NOTE — PROGRESS NOTES
0700 [facility], at bedside. Pt is resting in bed, droplet isolation protocol in use due to positive flu.    0840 Monitored heart rhythm is paced (underlying rhythm SR), (-/ 0.12/ 0.38), rate 60-70's.    1230 Dilaudid given for abd & back pain 6/10.    1450 Heparin drip started as ordered, pt verbalized understanding of POC.    1900 Report given to NOC nurseRadha, at bedside.

## 2018-01-10 ENCOUNTER — APPOINTMENT (OUTPATIENT)
Dept: RADIOLOGY | Facility: MEDICAL CENTER | Age: 48
DRG: 987 | End: 2018-01-10
Attending: HOSPITALIST
Payer: COMMERCIAL

## 2018-01-10 LAB
ALBUMIN SERPL BCP-MCNC: 2.6 G/DL (ref 3.2–4.9)
ALBUMIN/GLOB SERPL: 0.9 G/DL
ALP SERPL-CCNC: 38 U/L (ref 30–99)
ALT SERPL-CCNC: 60 U/L (ref 2–50)
ANION GAP SERPL CALC-SCNC: 6 MMOL/L (ref 0–11.9)
AST SERPL-CCNC: 75 U/L (ref 12–45)
BASOPHILS # BLD AUTO: 0.6 % (ref 0–1.8)
BASOPHILS # BLD: 0.06 K/UL (ref 0–0.12)
BILIRUB SERPL-MCNC: 12.6 MG/DL (ref 0.1–1.5)
BUN SERPL-MCNC: 16 MG/DL (ref 8–22)
CALCIUM SERPL-MCNC: 7.6 MG/DL (ref 8.4–10.2)
CHLORIDE SERPL-SCNC: 102 MMOL/L (ref 96–112)
CO2 SERPL-SCNC: 26 MMOL/L (ref 20–33)
CREAT SERPL-MCNC: 0.85 MG/DL (ref 0.5–1.4)
EOSINOPHIL # BLD AUTO: 0 K/UL (ref 0–0.51)
EOSINOPHIL NFR BLD: 0 % (ref 0–6.9)
ERYTHROCYTE [DISTWIDTH] IN BLOOD BY AUTOMATED COUNT: 41.7 FL (ref 35.9–50)
GLOBULIN SER CALC-MCNC: 2.9 G/DL (ref 1.9–3.5)
GLUCOSE SERPL-MCNC: 132 MG/DL (ref 65–99)
HCT VFR BLD AUTO: 30.4 % (ref 42–52)
HCT VFR BLD AUTO: 31.5 % (ref 42–52)
HCT VFR BLD AUTO: 32.4 % (ref 42–52)
HGB BLD-MCNC: 10.6 G/DL (ref 14–18)
HGB BLD-MCNC: 11.1 G/DL (ref 14–18)
HGB BLD-MCNC: 11.3 G/DL (ref 14–18)
IMM GRANULOCYTES # BLD AUTO: 0.39 K/UL (ref 0–0.11)
IMM GRANULOCYTES NFR BLD AUTO: 4 % (ref 0–0.9)
INR PPP: 1.31 (ref 0.87–1.13)
LYMPHOCYTES # BLD AUTO: 0.57 K/UL (ref 1–4.8)
LYMPHOCYTES NFR BLD: 5.8 % (ref 22–41)
MCH RBC QN AUTO: 30.4 PG (ref 27–33)
MCHC RBC AUTO-ENTMCNC: 34.9 G/DL (ref 33.7–35.3)
MCV RBC AUTO: 87.1 FL (ref 81.4–97.8)
MONOCYTES # BLD AUTO: 0.79 K/UL (ref 0–0.85)
MONOCYTES NFR BLD AUTO: 8 % (ref 0–13.4)
NEUTROPHILS # BLD AUTO: 8.03 K/UL (ref 1.82–7.42)
NEUTROPHILS NFR BLD: 81.6 % (ref 44–72)
NRBC # BLD AUTO: 0 K/UL
NRBC BLD-RTO: 0 /100 WBC
PLATELET # BLD AUTO: 139 K/UL (ref 164–446)
PMV BLD AUTO: 10.1 FL (ref 9–12.9)
POTASSIUM SERPL-SCNC: 4 MMOL/L (ref 3.6–5.5)
PROCALCITONIN SERPL-MCNC: 2.74 NG/ML
PROT SERPL-MCNC: 5.5 G/DL (ref 6–8.2)
PROTHROMBIN TIME: 15.9 SEC (ref 12–14.6)
RBC # BLD AUTO: 3.49 M/UL (ref 4.7–6.1)
SODIUM SERPL-SCNC: 134 MMOL/L (ref 135–145)
VANCOMYCIN TROUGH SERPL-MCNC: 7.3 UG/ML (ref 10–20)
WBC # BLD AUTO: 9.8 K/UL (ref 4.8–10.8)

## 2018-01-10 PROCEDURE — 85014 HEMATOCRIT: CPT

## 2018-01-10 PROCEDURE — 700105 HCHG RX REV CODE 258: Performed by: HOSPITALIST

## 2018-01-10 PROCEDURE — 94660 CPAP INITIATION&MGMT: CPT

## 2018-01-10 PROCEDURE — 700102 HCHG RX REV CODE 250 W/ 637 OVERRIDE(OP): Performed by: HOSPITALIST

## 2018-01-10 PROCEDURE — 700101 HCHG RX REV CODE 250: Performed by: HOSPITALIST

## 2018-01-10 PROCEDURE — 80202 ASSAY OF VANCOMYCIN: CPT

## 2018-01-10 PROCEDURE — A9270 NON-COVERED ITEM OR SERVICE: HCPCS | Performed by: INTERNAL MEDICINE

## 2018-01-10 PROCEDURE — 94760 N-INVAS EAR/PLS OXIMETRY 1: CPT

## 2018-01-10 PROCEDURE — 94640 AIRWAY INHALATION TREATMENT: CPT

## 2018-01-10 PROCEDURE — 700111 HCHG RX REV CODE 636 W/ 250 OVERRIDE (IP): Performed by: HOSPITALIST

## 2018-01-10 PROCEDURE — 71045 X-RAY EXAM CHEST 1 VIEW: CPT

## 2018-01-10 PROCEDURE — 700102 HCHG RX REV CODE 250 W/ 637 OVERRIDE(OP): Performed by: INTERNAL MEDICINE

## 2018-01-10 PROCEDURE — 99232 SBSQ HOSP IP/OBS MODERATE 35: CPT | Performed by: HOSPITALIST

## 2018-01-10 PROCEDURE — A9270 NON-COVERED ITEM OR SERVICE: HCPCS | Performed by: HOSPITALIST

## 2018-01-10 PROCEDURE — 85610 PROTHROMBIN TIME: CPT

## 2018-01-10 PROCEDURE — 84145 PROCALCITONIN (PCT): CPT

## 2018-01-10 PROCEDURE — 770022 HCHG ROOM/CARE - ICU (200)

## 2018-01-10 PROCEDURE — 85018 HEMOGLOBIN: CPT | Mod: 91

## 2018-01-10 PROCEDURE — 80053 COMPREHEN METABOLIC PANEL: CPT

## 2018-01-10 PROCEDURE — 85025 COMPLETE CBC W/AUTO DIFF WBC: CPT

## 2018-01-10 RX ORDER — WARFARIN SODIUM 7.5 MG/1
7.5 TABLET ORAL
Status: COMPLETED | OUTPATIENT
Start: 2018-01-10 | End: 2018-01-10

## 2018-01-10 RX ORDER — IPRATROPIUM BROMIDE AND ALBUTEROL SULFATE 2.5; .5 MG/3ML; MG/3ML
3 SOLUTION RESPIRATORY (INHALATION)
Status: DISCONTINUED | OUTPATIENT
Start: 2018-01-10 | End: 2018-01-12

## 2018-01-10 RX ORDER — FUROSEMIDE 10 MG/ML
20 INJECTION INTRAMUSCULAR; INTRAVENOUS
Status: DISCONTINUED | OUTPATIENT
Start: 2018-01-10 | End: 2018-01-11

## 2018-01-10 RX ADMIN — ALBUTEROL SULFATE 2.5 MG: 2.5 SOLUTION RESPIRATORY (INHALATION) at 07:31

## 2018-01-10 RX ADMIN — VANCOMYCIN HYDROCHLORIDE 2000 MG: 5 INJECTION, POWDER, LYOPHILIZED, FOR SOLUTION INTRAVENOUS at 12:45

## 2018-01-10 RX ADMIN — PIPERACILLIN AND TAZOBACTAM 3.38 G: 3; .375 INJECTION, POWDER, FOR SOLUTION INTRAVENOUS at 12:45

## 2018-01-10 RX ADMIN — HYDROMORPHONE HYDROCHLORIDE 0.5 MG: 1 INJECTION, SOLUTION INTRAMUSCULAR; INTRAVENOUS; SUBCUTANEOUS at 21:06

## 2018-01-10 RX ADMIN — FUROSEMIDE 20 MG: 10 INJECTION, SOLUTION INTRAMUSCULAR; INTRAVENOUS at 10:50

## 2018-01-10 RX ADMIN — IPRATROPIUM BROMIDE AND ALBUTEROL SULFATE 3 ML: .5; 3 SOLUTION RESPIRATORY (INHALATION) at 21:50

## 2018-01-10 RX ADMIN — IPRATROPIUM BROMIDE AND ALBUTEROL SULFATE 3 ML: .5; 3 SOLUTION RESPIRATORY (INHALATION) at 18:51

## 2018-01-10 RX ADMIN — STANDARDIZED SENNA CONCENTRATE AND DOCUSATE SODIUM 2 TABLET: 8.6; 5 TABLET, FILM COATED ORAL at 21:09

## 2018-01-10 RX ADMIN — PIPERACILLIN AND TAZOBACTAM 3.38 G: 3; .375 INJECTION, POWDER, FOR SOLUTION INTRAVENOUS at 21:11

## 2018-01-10 RX ADMIN — FUROSEMIDE 20 MG: 10 INJECTION, SOLUTION INTRAMUSCULAR; INTRAVENOUS at 15:53

## 2018-01-10 RX ADMIN — IPRATROPIUM BROMIDE AND ALBUTEROL SULFATE 3 ML: .5; 3 SOLUTION RESPIRATORY (INHALATION) at 11:34

## 2018-01-10 RX ADMIN — HYDROMORPHONE HYDROCHLORIDE 0.5 MG: 1 INJECTION, SOLUTION INTRAMUSCULAR; INTRAVENOUS; SUBCUTANEOUS at 02:56

## 2018-01-10 RX ADMIN — WARFARIN SODIUM 7.5 MG: 7.5 TABLET ORAL at 17:40

## 2018-01-10 RX ADMIN — PIPERACILLIN AND TAZOBACTAM 3.38 G: 3; .375 INJECTION, POWDER, FOR SOLUTION INTRAVENOUS at 04:33

## 2018-01-10 RX ADMIN — OSELTAMIVIR PHOSPHATE 75 MG: 75 CAPSULE ORAL at 21:08

## 2018-01-10 RX ADMIN — OXYCODONE HYDROCHLORIDE 5 MG: 5 TABLET ORAL at 07:34

## 2018-01-10 RX ADMIN — OSELTAMIVIR PHOSPHATE 75 MG: 75 CAPSULE ORAL at 08:14

## 2018-01-10 RX ADMIN — IPRATROPIUM BROMIDE AND ALBUTEROL SULFATE 3 ML: .5; 3 SOLUTION RESPIRATORY (INHALATION) at 14:39

## 2018-01-10 RX ADMIN — HYDROMORPHONE HYDROCHLORIDE 0.5 MG: 1 INJECTION, SOLUTION INTRAMUSCULAR; INTRAVENOUS; SUBCUTANEOUS at 09:26

## 2018-01-10 RX ADMIN — HYDROMORPHONE HYDROCHLORIDE 0.5 MG: 1 INJECTION, SOLUTION INTRAMUSCULAR; INTRAVENOUS; SUBCUTANEOUS at 12:28

## 2018-01-10 RX ADMIN — HYDROMORPHONE HYDROCHLORIDE 0.5 MG: 1 INJECTION, SOLUTION INTRAMUSCULAR; INTRAVENOUS; SUBCUTANEOUS at 06:07

## 2018-01-10 RX ADMIN — HYDROMORPHONE HYDROCHLORIDE 0.5 MG: 1 INJECTION, SOLUTION INTRAMUSCULAR; INTRAVENOUS; SUBCUTANEOUS at 17:39

## 2018-01-10 RX ADMIN — ACETAMINOPHEN 650 MG: 325 TABLET, FILM COATED ORAL at 12:27

## 2018-01-10 RX ADMIN — STANDARDIZED SENNA CONCENTRATE AND DOCUSATE SODIUM 2 TABLET: 8.6; 5 TABLET, FILM COATED ORAL at 08:14

## 2018-01-10 ASSESSMENT — ENCOUNTER SYMPTOMS
DOUBLE VISION: 0
FEVER: 0
BLURRED VISION: 0
DIARRHEA: 0
FOCAL WEAKNESS: 0
HEADACHES: 0
PALPITATIONS: 0
VOMITING: 0
COUGH: 1
NAUSEA: 0
DIZZINESS: 0
SHORTNESS OF BREATH: 1
CHILLS: 0
ABDOMINAL PAIN: 1

## 2018-01-10 ASSESSMENT — PAIN SCALES - GENERAL
PAINLEVEL_OUTOF10: 8
PAINLEVEL_OUTOF10: 4
PAINLEVEL_OUTOF10: 6
PAINLEVEL_OUTOF10: 5
PAINLEVEL_OUTOF10: 4
PAINLEVEL_OUTOF10: 3
PAINLEVEL_OUTOF10: 3
PAINLEVEL_OUTOF10: 8
PAINLEVEL_OUTOF10: 8
PAINLEVEL_OUTOF10: 7
PAINLEVEL_OUTOF10: 7
PAINLEVEL_OUTOF10: 8

## 2018-01-10 NOTE — PROGRESS NOTES
Inpatient Anticoagulation Service Note    Date: 1/10/2018   47/M resuming home warfarin for mechanical aortic valve replacement (INR goal 2.5-3.5).  Patient received two doses of vitamin K 1/7 - 5 mg PO and 5 mg IV.         Hemoglobin Value: (!) 11.3  Hematocrit Value: (!) 32.4  Lab Platelet Value: (!) 139    INR from last 7 days     Date/Time INR Value    01/10/18 1047 (!)  1.31    01/09/18 0420 (!)  1.4    01/08/18 0450 (!)  1.67    01/07/18 1015 (!)  3.02        Dose from last 7 days     None               Plan:  Warfarin 7.5 mg PO x 1.  Will monitor INR and clinical course.     Pharmacist suggested discharge dosing: Warfarin 5 mg PO daily if clinically appropriate.      Moreno [place], PharmD

## 2018-01-10 NOTE — PROGRESS NOTES
2 RN skin check completed, skin intact with exception of pt abdominal surgery with 4 lap sites, clean/dry with dermabond in use.

## 2018-01-10 NOTE — FLOWSHEET NOTE
01/10/18 1135   Events/Summary/Plan   Events/Summary/Plan Tx given. IS done   Non-Invasive Resp Device Site Inspection Completed Intact   Interdisciplinary Plan of Care-Goals (Indications)   Obstructive Ventilatory Defect or Pulmonary Disease without Obvious Obstruction Physical Exam / Hyperinflation / Wheezing (bronchospasm)   Hyperinflation Protocol Indications Pre or Post-op Abdominal, Thoracic or Orthopedic Surgery;Restrictive Lung Disorder / Consolidation   Interdisciplinary Plan of Care-Outcomes    Hyperinflation Protocol Goals/Outcome Improvement in Repeat CXR;Increased Vital Capacity or Return to Pre-operative Values   Education   Education Yes - Pt. / Family has been Instructed in use of Respiratory Medications and Adverse Reactions;Yes - Pt. / Family has been Instructed in use of Respiratory Equipment   RT Assessment of Delivered Medications   Evaluation of Medication Delivery Daily Yes-- Pt /Family has been Instructed in use of Respiratory Medications and Adverse Reactions   SVN Group   #SVN Performed Yes   Given By: Mouthpiece   Respiratory WDL   Respiratory (WDL) X   Chest Exam   Work Of Breathing / Effort Mild   Respiration 16   Pulse 82   Heart Rate (Monitored) 76   Breath Sounds   Pre/Post Intervention Post Intervention Assessment   RUL Breath Sounds Expiratory Wheezes   RML Breath Sounds Expiratory Wheezes   RLL Breath Sounds Diminished;Expiratory Wheezes   DELORES Breath Sounds Expiratory Wheezes   LLL Breath Sounds Diminished;Expiratory Wheezes   Secretions   Cough Non Productive   How Sputum Obtained Spontaneous   Oximetry   Continuous Oximetry Yes   Oxygen   Pulse Oximetry 93 %   O2 (LPM) 4   O2 Daily Delivery Respiratory  Silicone Nasal Cannula

## 2018-01-10 NOTE — RESPIRATORY CARE
Bipap/Cpap mask placed.  Can the patient demonstrate removal of mask?yes  If no, please notify physician.

## 2018-01-10 NOTE — PROGRESS NOTES
Family visiting at bedside, Pt up sitting in chair, ABX infusing per MD orders, medicated for pain Q 3 hrs per MD orders see MAR. 02 at 4 L NC, Pt voiding in urinal after receiving Lasix IVP. Call light within reach, monitoring.

## 2018-01-10 NOTE — OR NURSING
Four opsites clean and dry to abdomen. Work of breathing labored, Dr. Monson gave Fentanyl upon arrival to PACU as patient stated he was having pain in abdomen. IVF going at [facility] for now. Albuterol treatment given per Dr. Monson. Patient has few expiratory wheezes in lungs and scattered crackles. Report to ICU and transferred.

## 2018-01-10 NOTE — CARE PLAN
Problem: Nutritional:  Goal: Achieve adequate nutritional intake  Diet advancement   Outcome: PROGRESSING SLOWER THAN EXPECTED  Pt states early satiety with a few bites of food. Appetite returning slowly. Pt encouraged to continue to take bites of food as he is able.

## 2018-01-10 NOTE — CARE PLAN
Problem: Communication  Goal: The ability to communicate needs accurately and effectively will improve    Intervention: Educate patient and significant other/support system about the plan of care, procedures, treatments, medications and allow for questions  POC reviewed with Pt and wife, questions answered, medications, treatment, diet and equipment explained. ICU routines reviewed. Call light explained and Pt VU.      Problem: Respiratory:  Goal: Respiratory status will improve    Intervention: Administer and titrate oxygen therapy  Titrate oxygen per respiratory protocol, Pt on 4 L NC at this time

## 2018-01-10 NOTE — PROGRESS NOTES
2030 - Report received. Pt on unit via SUE Bed, ICU monitoring implemented. Pt lines and gtts verified. Pt A&O x4, pt c/o pain 8/10, medicated per MAR. Pt turns self appropriately, q2h turns encouraged with pillows in use for support/repositioning. POC discussed with pt and significant other Lita at bedside, discussed pain management, monitoring vital signs including titrating O2 appropriately and discussed use of CPap as pt uses it at home, further discussed fall precautions, pt and significant other verbalize understanding, deny further questions/concerns at this time. All safety precautions in place, bed alarm in use, call light within reach at all times. Will continue to monitor.

## 2018-01-10 NOTE — FLOWSHEET NOTE
01/09/18 2227   Events/Summary/Plan   Events/Summary/Plan On CPAP   General Vent Information   Pulse Oximetry 90 %   Heart Rate (Monitored) 77   CPAP/BiPAP TRIP Group   Nocturnal CPAP or BiPAP CPAP   #System Evaluation Yes   Home Unit Used? Yes   Equipment Inspected for Cleanliness, Operation, and Safety? Yes   Settings (If Known) 10-15   FiO2 or LPM 7   Home Mask Used? Yes   Breath Sounds   RUL Breath Sounds Diminished   RML Breath Sounds Diminished   RLL Breath Sounds Diminished   DELORES Breath Sounds Diminished   LLL Breath Sounds Diminished

## 2018-01-10 NOTE — FLOWSHEET NOTE
01/10/18 0733   Events/Summary/Plan   Events/Summary/Plan Off CPAp on 3L sats 92%   Non-Invasive Resp Device Site Inspection Completed Intact   Interdisciplinary Plan of Care-Goals (Indications)   Obstructive Ventilatory Defect or Pulmonary Disease without Obvious Obstruction Physical Exam / Hyperinflation / Wheezing (bronchospasm)   Interdisciplinary Plan of Care-Outcomes    Bronchodilator Outcome Diminished Wheezing and Volume of Air Movement Increased   Education   Education Yes - Pt. / Family has been Instructed in use of Respiratory Medications and Adverse Reactions   RT Assessment of Delivered Medications   Evaluation of Medication Delivery Daily Yes-- Pt /Family has been Instructed in use of Respiratory Medications and Adverse Reactions   SVN Group   #SVN Performed Yes   Given By: Mouthpiece   Date SVN Last Changed 01/10/18   Date SVN Next Change Due (Q 7 Days) 01/17/18   Respiratory WDL   Respiratory (WDL) X   Chest Exam   Work Of Breathing / Effort Mild   Respiration (!) 26   Pulse 69   Heart Rate (Monitored) 71   Breath Sounds   Pre/Post Intervention Pre Intervention Assessment   RUL Breath Sounds Expiratory Wheezes   RML Breath Sounds Expiratory Wheezes   RLL Breath Sounds Diminished   DELORES Breath Sounds Expiratory Wheezes   LLL Breath Sounds Diminished   Secretions   Cough Non Productive;Congested   How Sputum Obtained Spontaneous   Oximetry   Continuous Oximetry Yes   Oxygen   Home O2 Use Prior To Admission? No   Pulse Oximetry 92 %   O2 (LPM) 3   O2 Daily Delivery Respiratory  Silicone Nasal Cannula

## 2018-01-10 NOTE — PROGRESS NOTES
Report received from NOC, Pt resting in bed, home CPAP for NOC's switched over to 3 L NC, Pt's breathing shallow, has c/o of ABD pain 8/10, Stab sites CD&I, RT in room for breathing TX, POC and pain medication schedule reviewed with Pt, Pt medicated, see MAR. Isolation precautions in place for positive Flu. Pt awake and alert, using call light appropriately. Within view of nursing station, WCM.

## 2018-01-10 NOTE — OP REPORT
DATE OF SERVICE:  01/09/2018    PREOPERATIVE DIAGNOSIS:  Acute cholecystitis.    POSTOPERATIVE DIAGNOSIS:  Acute cholecystitis with hepatic congestion.    OPERATION:  Laparoscopic cholecystectomy.    SURGEON:  Rodney Mcallister MD    ANESTHESIOLOGIST:  Will Monson MD    ASSISTANT:  Andriy James MD    OPERATIVE NOTE:  The patient, 47 years of age, presents with acute   cholecystitis.  The patient does have comorbidities including organic heart   disease and valvular disease.  He was anticoagulated at the time of admission.    This was reversed.  The patient is now a candidate to proceed with surgery   today.  He did develop some increasing shortness of breath.  He did test   positive for influenza at the time of admission.  He has developed some   pulmonary infiltrates, may be developing bacterial pneumonia.  He is on   broad-spectrum antibiotics.  Weighing the risks and possible complications, we   elected to proceed with surgery as the patient has been in considerable pain   and it was felt that getting his gallbladder out may actually help his   breathing.  Patient did agree to go to ICU postoperatively and was aware that   he may require mechanical ventilation.  Patient did receive antibiotics   shortly before operation.  Sequential stockings were applied as antiembolism   prophylaxis.  He had been on heparin drip and this was discontinued 6 hours   prior to surgery.  Patient was taken to the operating room with his consent,   placed under anesthesia by Dr. Monson.  His abdomen was prepped with ChloraPrep   solution.  Sterile drapes were applied.  A timeout was affected.  A solution   of 0.5% Marcaine with epinephrine was liberally infiltrated into all wounds.    An infraumbilical incision was made and the umbilicus was elevated.  The   fascia was pierced with a Veress needle.  Saline drop test was permissive to   proceed with step pneumo insufflation.  Following full insufflation, an 11 mm   trocar was placed.  The  abdomen was carefully surveyed.  There was no evidence   of injury related to entry.  An 11 mm trocar was placed in the high right   epigastrium and two 5 mm trocars were placed in right upper quadrant.    Patient's gallbladder was exposed.  There were inflammatory adhesions and it   was clearly distended and inflamed.  Patient had a needle inserted in the   gallbladder and it was partially decompressed.  This allowed graspers to be   applied.  He was placed on traction.  The hepatoduodenal ligament was   carefully dissected.  Patient was found to have a very short cystic duct and   the cystic artery appeared to be running parallel and adjacent to the cystic   duct intimately.  These could not be  due to inflammation.  A   provisional clip was placed distally.  A choledochotomy was made in   preparation for cholangiogram with arterial bleeding ensued and it was felt   that we should go ahead and not do the cholangiogram under the circumstance in   order to make sure we had secured control of the blood vessel and duct.    Additional clips were applied distally.  The duct was transected.  There was   no other cystic artery, confirming my clinical judgment.  The patient's   gallbladder was then taken out in anterograde fashion using countertraction   electrocautery and ultimately delivered via an Endosac.  Patient's gallbladder   fossa was made hemostatic.  The patient had some bile spillage, which was   copiously irrigated in the right upper quadrant and decompressed.  Hemostasis   actually appeared to be quite good.  The liver was quite congested and there   was a transmitted heartbeat through the liver throughout the case, which made   it challenging, but nevertheless seems to have been affected safely.  No drain   was indicated.  The patient's procedure was terminated with removal of the   upper abdominal trocars.  Their sites were hemostatic.  The patient had the   fascia closed with removal of the  umbilical trocar using 0 Vicryl suture.  The   wounds were irrigated and closed using running 4-0 Vicryl subcuticulars.    Then, the wounds were sealed with Dermabond.  Patient was awakened, extubated   and observed in the operating room for ventilatory efficiency and then will be   transported to postanesthesia recovery.  Once stabilized there, he will go to   ICU tonight for respiratory care and careful monitoring.  Antibiotics will be   continued.  Estimated blood loss was around 75 mL.  Sponge, instrument,   needle counts were reported as correct for all phases of surgery, which I   believe was uncomplicated.       ____________________________________     MD HALEY CALDERA / NTS    DD:  01/09/2018 20:25:08  DT:  01/09/2018 21:00:27    D#:  [id number]  Job#:  [id number]    cc: Kalie Ziegler MD, OFELIA BOURNE MD, Andriy James MD

## 2018-01-10 NOTE — OR NURSING
Patient allergies and NPO status verified. Patient verbalizes understanding of pain scale, expected course of stay and plan of care. Surgical site verified with patient. IV assessed for patency, sequentials placed on BLE.

## 2018-01-10 NOTE — PROGRESS NOTES
Hanging  In there respiratory wise  VSS  Hgb  Drifted   Probably could add prophylactic lovenox but would recommend assuring hgb stability before full anticoagulation   May give coumadin    Bilirubin is disturbing , does have hepatic congestion   Pacemaker precludes MRCP   Would consider HIDA if doesn't start to turn around

## 2018-01-10 NOTE — PROGRESS NOTES
"Pharmacy Kinetics 47 y.o. male on vancomycin day # 2 1/10/2018    Currently on Vancomycin 2800 mg iv x 1  Indication for Treatment: PNA    Pertinent history per medical record: Admitted on 2018 for acute cholecystitis, influenza B.    Other antibiotics: Zosyn, Tamiflu    Allergies: Lactose     List concerns for renal function (possible concerns include abnormal LFTs, BUN/SCr ratio > 20:1, CHF, obesity, malnutrition/low albumin, hypermetabolic state (SIRS), pressors/hypotension, nephrotoxic drugs, etc.): obsesity    Pertinent cultures to date:    BCx NGTD, Influenza B positive (PCR)    Recent Labs      18   0450  18   0420  01/10/18   0125   WBC  6.8  7.6  9.8   NEUTSPOLYS   --   76.90*  81.60*     Recent Labs      18   0450  18   0420  01/10/18   0125   BUN  14  19  16   CREATININE  1.12  0.82  0.85   ALBUMIN  3.4  3.5  2.6*     Recent Labs      01/10/18   1047   VANCOTROUGH  7.3*     Intake/Output Summary (Last 24 hours) at 01/10/18 1226  Last data filed at 01/10/18 1200   Gross per 24 hour   Intake             3600 ml   Output             1700 ml   Net             1900 ml      Blood pressure 136/87, pulse (!) 104, temperature (!) 38.8 °C (101.8 °F), resp. rate (!) 25, height 1.727 m (5' 8\"), weight 113.6 kg (250 lb 7.1 oz), SpO2 92 %. Temp (24hrs), Av.2 °C (98.9 °F), Min:36.4 °C (97.6 °F), Max:38.8 °C (101.8 °F)      A/P   1. Vancomycin dose change: Vancomycin 2000 mg IV q12h  2. Next vancomycin level:  @ 1230  3. Goal trough: 16-20 mcg/ml  4. Comments: Will monitor labs and clinical course    Moreno Boston, PharmD    "

## 2018-01-10 NOTE — OR SURGEON
Immediate Post OP Note    PreOp Diagnosis: acute jose     PostOp Diagnosis: same    Procedure(s):  JOSE BY LAPAROSCOPY WITH GRAMS      Surgeon(s):  CAROLIN Seymour M.D.    Anesthesiologist/Type of Anesthesia:  Anesthesiologist: Pacheco Pollard M.D./* No anesthesia type entered *    Surgical Staff:  Circulator: Yamil Pisano R.N.  Scrub Person: Kyle Lai    Specimens:  * No specimens in log *    Estimated Blood Loss:  75    Findings: same    Complications: 0        1/9/2018 7:52 PM Rodney Mcallister

## 2018-01-10 NOTE — PROGRESS NOTES
[facility] Hospitalist Progress Note    Date of Service: 2018    Chief Complaint  47 y.o. male admitted 2018 with CVA, CAD, HTN, apfib, pacemaker, TRIP who presented with acute cholecystitis and influenza B.     Interval Problem Update  Continues to have RUQ pain, on Zosyn. Cholecystectomy with Dr. Mcallister tomorrow.  CXR with edema. Hypoxia on 3-4L O2. On Tamiflu  TTE pending  INR 1.67 after vit K. Heparin bridge for mech valve until surgery.   pt on 4Lof o2, received 40mg lasix with good response, repeated cxr showed b/l infiltrate probably infectious, added vancomycin due to risk for MRSA infection due to + influenza, discussed with Dr Mcallister surgery regarding new finding he stated that patient is very symptomatic from his infected gallbladder and will benefit from surgery, patient will need very close monitoring during and after surgery.   Condition guarded.     Consultants/Specialty  General surgery    Disposition  TBD.         Review of Systems   Constitutional: Positive for malaise/fatigue. Negative for fever.   Eyes: Negative for blurred vision.   Respiratory: Positive for cough and shortness of breath.    Cardiovascular: Negative for chest pain and palpitations.   Gastrointestinal: Positive for abdominal pain. Negative for diarrhea, nausea and vomiting.   Genitourinary: Negative for dysuria.   Skin: Negative for itching.   Neurological: Negative for dizziness, focal weakness and headaches.      Physical Exam  Laboratory/Imaging   Hemodynamics  Temp (24hrs), Av.4 °C (99.3 °F), Min:37.2 °C (99 °F), Max:37.7 °C (99.8 °F)   Temperature: 37.2 °C (99 °F)  Pulse  Av.6  Min: 58  Max: 104    Blood Pressure: 136/87      Respiratory      Respiration: 18, Pulse Oximetry: 97 %, O2 Daily Delivery Respiratory : Silicone Nasal Cannula     Work Of Breathing / Effort: Mild  RUL Breath Sounds: Clear, RML Breath Sounds: Diminished, RLL Breath Sounds: Diminished, DELORES Breath Sounds: Clear, LLL Breath Sounds:  Diminished    Fluids    Intake/Output Summary (Last 24 hours) at 01/09/18 1813  Last data filed at 01/09/18 1100   Gross per 24 hour   Intake                0 ml   Output             1400 ml   Net            -1400 ml       Nutrition  Orders Placed This Encounter   Procedures   • DIET NPO     Standing Status:   Standing     Number of Occurrences:   1     Order Specific Question:   Restrict to:     Answer:   Strict [1]     Physical Exam   Constitutional: He is oriented to person, place, and time. He is cooperative. No distress.   HENT:   Head: Normocephalic.   Mouth/Throat: No oropharyngeal exudate.   Eyes: Conjunctivae and EOM are normal.   Cardiovascular: Normal rate and regular rhythm.    Systolic click   Pulmonary/Chest: Effort normal. He has no wheezes. He has rales.   Decreased breath sound at bases   Abdominal: Soft. Bowel sounds are normal. There is tenderness (RUQ tenderness). There is no rebound and no guarding.   Musculoskeletal: He exhibits no edema.   Neurological: He is alert and oriented to person, place, and time.   Skin: No erythema.   Nursing note and vitals reviewed.      Recent Labs      01/07/18   1015  01/08/18   0450  01/09/18   0420   WBC  6.1  6.8  7.6   RBC  4.50*  4.06*  3.90*   HEMOGLOBIN  13.9*  12.3*  12.0*   HEMATOCRIT  39.5*  36.2*  34.2*   MCV  87.8  89.2  87.7   MCH  30.9  30.3  30.8   MCHC  35.2  34.0  35.1   RDW  42.3  43.6  42.5   PLATELETCT  119*  120*  117*   MPV  9.3  9.9  9.7     Recent Labs      01/07/18   1015  01/08/18   0450  01/09/18   0420   SODIUM  139  137  134*   POTASSIUM  4.3  4.2  4.2   CHLORIDE  105  104  101   CO2  25  25  24   GLUCOSE  118*  103*  116*   BUN  15  14  19   CREATININE  0.91  1.12  0.82   CALCIUM  8.7  8.2*  8.3*     Recent Labs      01/07/18   1015  01/08/18   0450   01/08/18   2127  01/09/18   0420  01/09/18   1106   APTT  49.3*   --    < >  120.1*  96.6*  64.6*   INR  3.02*  1.67*   --    --   1.40*   --     < > = values in this interval not  displayed.     Recent Labs      01/07/18   1015  01/09/18   0420   BNPBTYPENAT  63  108*              Assessment/Plan     * Acute cholecystitis- (present on admission)   Assessment & Plan    Rising LFTs. US gallbladder showed cholelithiasis with wall thickening. No signs of sepsis.  - continue zosyn  - cholecystectomy with Dr. Mcallister tomorrow 6pm  - clear diet, NPO after breakfast tomorrow  - pain control  - blood cultures neg  For surgery today.        SOB (shortness of breath)- (present on admission)   Assessment & Plan    Mild interstitial markings on CXR with symptoms of PND. Only trace LE edema and no JVD present. Last echo was done in Sept 2016 showed EF 60% and moderate mitral stenosis. Influenza B is positive.  - limit IVF as possible  - repeat echo  - tamiflu  cxr on 1/9 showing worsening infiltrates, possible pneumonia vs ARDS, on vanco, zosyn. o2 per protocol.         Chronic anticoagulation- (present on admission)   Assessment & Plan    pAFib and h/o mechanical aortic valve replacement. INR goal 2.5-3.5. But plans for surgery tomorrow night. INR 1.6 after vit K.   - given risks of thrombosis with mech valve and pAfib, will bridge on heparin until surgery tomorrow  Will restart heparin and warfarin when ok with surgery.         Influenza B- (present on admission)   Assessment & Plan    Positive swab. Symptoms have been improving, per patient.   - given hypoxia, started on tamiflu  Now probably complicated with bacterial pneumonia, added vancomycin since there is risk for MRSA infection. Continue zosyn.         TRIP on CPAP- (present on admission)   Assessment & Plan    Home CPAP at bedside.        Hypertension- (present on admission)   Assessment & Plan    Normotensive. Currently not on any home medications.   continue to monitor          very ill patient, keep close monitoring.   Condition guarded.     Reviewed items::  EKG reviewed, Labs reviewed, Medications reviewed and Radiology images reviewed  Sriram  catheter::  No Bhatia  DVT prophylaxis pharmacological::  Not indicated at this time, ambulatory  DVT prophylaxis - mechanical:  SCDs  Antibiotics:  Treating active infection/contamination beyond 24 hours perioperative coverage

## 2018-01-10 NOTE — FLOWSHEET NOTE
01/10/18 1438   Events/Summary/Plan   Events/Summary/Plan Tx given, IS done   Non-Invasive Resp Device Site Inspection Completed Intact   Interdisciplinary Plan of Care-Goals (Indications)   Obstructive Ventilatory Defect or Pulmonary Disease without Obvious Obstruction Physical Exam / Hyperinflation / Wheezing (bronchospasm)   Interdisciplinary Plan of Care-Outcomes    Bronchodilator Outcome Diminished Wheezing and Volume of Air Movement Increased   Hyperinflation Protocol Goals/Outcome Improvement in Repeat CXR;Increased Vital Capacity or Return to Pre-operative Values   Education   Education Yes - Pt. / Family has been Instructed in use of Respiratory Medications and Adverse Reactions;Yes - Pt. / Family has been Instructed in use of Respiratory Equipment   RT Assessment of Delivered Medications   Evaluation of Medication Delivery Daily Yes-- Pt /Family has been Instructed in use of Respiratory Medications and Adverse Reactions   SVN Group   #SVN Performed Yes   Given By: Mouthpiece   Incentive Spirometry Group   Incentive Spirometry Instruction Yes   Breathing Exercises Yes   Incentive Spirometer Volume 1750 mL   Respiratory WDL   Respiratory (WDL) X   Chest Exam   Work Of Breathing / Effort Mild;Shallow   Respiration (!) 24   Pulse 65   Heart Rate (Monitored) 70   Breath Sounds   Pre/Post Intervention Pre Intervention Assessment   RUL Breath Sounds Diminished;Expiratory Wheezes   RML Breath Sounds Diminished;Expiratory Wheezes   RLL Breath Sounds Diminished   DELORES Breath Sounds Diminished;Expiratory Wheezes   LLL Breath Sounds Diminished   Oximetry   #Pulse Oximetry (Single Determination) Yes   Oxygen   Home O2 Use Prior To Admission? No   Pulse Oximetry 95 %   O2 (LPM) 4   O2 Daily Delivery Respiratory  Silicone Nasal Cannula

## 2018-01-10 NOTE — CARE PLAN
Problem: Safety  Goal: Will remain free from injury  Outcome: PROGRESSING AS EXPECTED  All safety precautions in place, bed alarm in use, side rails up x3, call light within reach at all times. Will continue to monitor.    Problem: Knowledge Deficit  Goal: Knowledge of disease process/condition, treatment plan, diagnostic tests, and medications will improve  Outcome: PROGRESSING AS EXPECTED  POC discussed with pt regarding pain management and pian management plan made, discussed monitoring vital signs and titrating/weaning O2, discussed fall precautions, pt verbalizes understanding, denies further questions/concerns at this time. Will continue to monitor.

## 2018-01-11 ENCOUNTER — APPOINTMENT (OUTPATIENT)
Dept: RADIOLOGY | Facility: MEDICAL CENTER | Age: 48
DRG: 987 | End: 2018-01-11
Attending: SURGERY
Payer: COMMERCIAL

## 2018-01-11 ENCOUNTER — APPOINTMENT (OUTPATIENT)
Dept: RADIOLOGY | Facility: MEDICAL CENTER | Age: 48
DRG: 987 | End: 2018-01-11
Attending: HOSPITALIST
Payer: COMMERCIAL

## 2018-01-11 ENCOUNTER — TELEPHONE (OUTPATIENT)
Dept: CARDIOLOGY | Facility: MEDICAL CENTER | Age: 48
End: 2018-01-11

## 2018-01-11 PROBLEM — R17 JAUNDICE: Status: ACTIVE | Noted: 2018-01-11

## 2018-01-11 PROBLEM — I50.32 CHRONIC DIASTOLIC HEART FAILURE (HCC): Status: ACTIVE | Noted: 2018-01-11

## 2018-01-11 LAB
ALBUMIN SERPL BCP-MCNC: 2.4 G/DL (ref 3.2–4.9)
ALBUMIN/GLOB SERPL: 0.8 G/DL
ALP SERPL-CCNC: 37 U/L (ref 30–99)
ALT SERPL-CCNC: 60 U/L (ref 2–50)
ANION GAP SERPL CALC-SCNC: 8 MMOL/L (ref 0–11.9)
AST SERPL-CCNC: 89 U/L (ref 12–45)
BASOPHILS # BLD AUTO: 0.2 % (ref 0–1.8)
BASOPHILS # BLD: 0.02 K/UL (ref 0–0.12)
BILIRUB SERPL-MCNC: 15.1 MG/DL (ref 0.1–1.5)
BUN SERPL-MCNC: 15 MG/DL (ref 8–22)
CALCIUM SERPL-MCNC: 7.5 MG/DL (ref 8.4–10.2)
CHLORIDE SERPL-SCNC: 98 MMOL/L (ref 96–112)
CO2 SERPL-SCNC: 27 MMOL/L (ref 20–33)
CREAT SERPL-MCNC: 0.92 MG/DL (ref 0.5–1.4)
EOSINOPHIL # BLD AUTO: 0.05 K/UL (ref 0–0.51)
EOSINOPHIL NFR BLD: 0.6 % (ref 0–6.9)
ERYTHROCYTE [DISTWIDTH] IN BLOOD BY AUTOMATED COUNT: 42.7 FL (ref 35.9–50)
GLOBULIN SER CALC-MCNC: 2.9 G/DL (ref 1.9–3.5)
GLUCOSE SERPL-MCNC: 120 MG/DL (ref 65–99)
HCT VFR BLD AUTO: 28.6 % (ref 42–52)
HCT VFR BLD AUTO: 29.6 % (ref 42–52)
HCT VFR BLD AUTO: 32.3 % (ref 42–52)
HGB BLD-MCNC: 10.2 G/DL (ref 14–18)
HGB BLD-MCNC: 11.4 G/DL (ref 14–18)
HGB BLD-MCNC: 9.8 G/DL (ref 14–18)
IMM GRANULOCYTES # BLD AUTO: 0.32 K/UL (ref 0–0.11)
IMM GRANULOCYTES NFR BLD AUTO: 3.7 % (ref 0–0.9)
INR PPP: 1.46 (ref 0.87–1.13)
LYMPHOCYTES # BLD AUTO: 0.7 K/UL (ref 1–4.8)
LYMPHOCYTES NFR BLD: 8.1 % (ref 22–41)
MAGNESIUM SERPL-MCNC: 2 MG/DL (ref 1.5–2.5)
MCH RBC QN AUTO: 30 PG (ref 27–33)
MCHC RBC AUTO-ENTMCNC: 34.3 G/DL (ref 33.7–35.3)
MCV RBC AUTO: 87.5 FL (ref 81.4–97.8)
MONOCYTES # BLD AUTO: 0.91 K/UL (ref 0–0.85)
MONOCYTES NFR BLD AUTO: 10.5 % (ref 0–13.4)
NEUTROPHILS # BLD AUTO: 6.65 K/UL (ref 1.82–7.42)
NEUTROPHILS NFR BLD: 76.9 % (ref 44–72)
NRBC # BLD AUTO: 0 K/UL
NRBC BLD-RTO: 0 /100 WBC
PLATELET # BLD AUTO: 148 K/UL (ref 164–446)
PMV BLD AUTO: 10.3 FL (ref 9–12.9)
POTASSIUM SERPL-SCNC: 3.5 MMOL/L (ref 3.6–5.5)
PROCALCITONIN SERPL-MCNC: 2.75 NG/ML
PROT SERPL-MCNC: 5.3 G/DL (ref 6–8.2)
PROTHROMBIN TIME: 17.3 SEC (ref 12–14.6)
RBC # BLD AUTO: 3.27 M/UL (ref 4.7–6.1)
SODIUM SERPL-SCNC: 133 MMOL/L (ref 135–145)
WBC # BLD AUTO: 8.7 K/UL (ref 4.8–10.8)

## 2018-01-11 PROCEDURE — 700111 HCHG RX REV CODE 636 W/ 250 OVERRIDE (IP): Performed by: HOSPITALIST

## 2018-01-11 PROCEDURE — 94668 MNPJ CHEST WALL SBSQ: CPT

## 2018-01-11 PROCEDURE — A9270 NON-COVERED ITEM OR SERVICE: HCPCS | Performed by: HOSPITALIST

## 2018-01-11 PROCEDURE — 700105 HCHG RX REV CODE 258: Performed by: HOSPITALIST

## 2018-01-11 PROCEDURE — 700111 HCHG RX REV CODE 636 W/ 250 OVERRIDE (IP): Performed by: INTERNAL MEDICINE

## 2018-01-11 PROCEDURE — A9270 NON-COVERED ITEM OR SERVICE: HCPCS | Performed by: INTERNAL MEDICINE

## 2018-01-11 PROCEDURE — 85018 HEMOGLOBIN: CPT

## 2018-01-11 PROCEDURE — 700101 HCHG RX REV CODE 250: Performed by: HOSPITALIST

## 2018-01-11 PROCEDURE — 83735 ASSAY OF MAGNESIUM: CPT

## 2018-01-11 PROCEDURE — 85014 HEMATOCRIT: CPT | Mod: 91

## 2018-01-11 PROCEDURE — 700102 HCHG RX REV CODE 250 W/ 637 OVERRIDE(OP): Performed by: HOSPITALIST

## 2018-01-11 PROCEDURE — 94667 MNPJ CHEST WALL 1ST: CPT

## 2018-01-11 PROCEDURE — 700102 HCHG RX REV CODE 250 W/ 637 OVERRIDE(OP): Performed by: INTERNAL MEDICINE

## 2018-01-11 PROCEDURE — 80053 COMPREHEN METABOLIC PANEL: CPT

## 2018-01-11 PROCEDURE — 94660 CPAP INITIATION&MGMT: CPT

## 2018-01-11 PROCEDURE — 85025 COMPLETE CBC W/AUTO DIFF WBC: CPT

## 2018-01-11 PROCEDURE — 99233 SBSQ HOSP IP/OBS HIGH 50: CPT | Performed by: HOSPITALIST

## 2018-01-11 PROCEDURE — 84145 PROCALCITONIN (PCT): CPT

## 2018-01-11 PROCEDURE — 94640 AIRWAY INHALATION TREATMENT: CPT

## 2018-01-11 PROCEDURE — A9537 TC99M MEBROFENIN: HCPCS

## 2018-01-11 PROCEDURE — 94760 N-INVAS EAR/PLS OXIMETRY 1: CPT

## 2018-01-11 PROCEDURE — 85610 PROTHROMBIN TIME: CPT

## 2018-01-11 PROCEDURE — 71045 X-RAY EXAM CHEST 1 VIEW: CPT

## 2018-01-11 PROCEDURE — 83789 MASS SPECTROMETRY QUAL/QUAN: CPT

## 2018-01-11 PROCEDURE — 770022 HCHG ROOM/CARE - ICU (200)

## 2018-01-11 RX ORDER — ONDANSETRON 2 MG/ML
4 INJECTION INTRAMUSCULAR; INTRAVENOUS EVERY 4 HOURS PRN
Status: DISCONTINUED | OUTPATIENT
Start: 2018-01-11 | End: 2018-01-14 | Stop reason: HOSPADM

## 2018-01-11 RX ORDER — POTASSIUM CHLORIDE 20 MEQ/1
40 TABLET, EXTENDED RELEASE ORAL DAILY
Status: DISCONTINUED | OUTPATIENT
Start: 2018-01-11 | End: 2018-01-14 | Stop reason: HOSPADM

## 2018-01-11 RX ORDER — WARFARIN SODIUM 7.5 MG/1
7.5 TABLET ORAL
Status: COMPLETED | OUTPATIENT
Start: 2018-01-11 | End: 2018-01-11

## 2018-01-11 RX ORDER — FUROSEMIDE 10 MG/ML
40 INJECTION INTRAMUSCULAR; INTRAVENOUS
Status: DISCONTINUED | OUTPATIENT
Start: 2018-01-11 | End: 2018-01-13

## 2018-01-11 RX ADMIN — ACETAMINOPHEN 650 MG: 325 TABLET, FILM COATED ORAL at 13:37

## 2018-01-11 RX ADMIN — FUROSEMIDE 40 MG: 10 INJECTION, SOLUTION INTRAVENOUS at 15:31

## 2018-01-11 RX ADMIN — FUROSEMIDE 20 MG: 10 INJECTION, SOLUTION INTRAMUSCULAR; INTRAVENOUS at 05:43

## 2018-01-11 RX ADMIN — VANCOMYCIN HYDROCHLORIDE 2000 MG: 5 INJECTION, POWDER, LYOPHILIZED, FOR SOLUTION INTRAVENOUS at 01:10

## 2018-01-11 RX ADMIN — IPRATROPIUM BROMIDE AND ALBUTEROL SULFATE 3 ML: .5; 3 SOLUTION RESPIRATORY (INHALATION) at 11:30

## 2018-01-11 RX ADMIN — POTASSIUM CHLORIDE 40 MEQ: 1500 TABLET, EXTENDED RELEASE ORAL at 11:00

## 2018-01-11 RX ADMIN — HYDROMORPHONE HYDROCHLORIDE 0.5 MG: 1 INJECTION, SOLUTION INTRAMUSCULAR; INTRAVENOUS; SUBCUTANEOUS at 20:19

## 2018-01-11 RX ADMIN — ACETAMINOPHEN 650 MG: 325 TABLET, FILM COATED ORAL at 01:15

## 2018-01-11 RX ADMIN — IPRATROPIUM BROMIDE AND ALBUTEROL SULFATE 3 ML: .5; 3 SOLUTION RESPIRATORY (INHALATION) at 02:47

## 2018-01-11 RX ADMIN — PIPERACILLIN AND TAZOBACTAM 3.38 G: 3; .375 INJECTION, POWDER, FOR SOLUTION INTRAVENOUS at 05:43

## 2018-01-11 RX ADMIN — STANDARDIZED SENNA CONCENTRATE AND DOCUSATE SODIUM 2 TABLET: 8.6; 5 TABLET, FILM COATED ORAL at 08:43

## 2018-01-11 RX ADMIN — PIPERACILLIN AND TAZOBACTAM 3.38 G: 3; .375 INJECTION, POWDER, FOR SOLUTION INTRAVENOUS at 12:46

## 2018-01-11 RX ADMIN — IPRATROPIUM BROMIDE AND ALBUTEROL SULFATE 3 ML: .5; 3 SOLUTION RESPIRATORY (INHALATION) at 06:33

## 2018-01-11 RX ADMIN — IPRATROPIUM BROMIDE AND ALBUTEROL SULFATE 3 ML: .5; 3 SOLUTION RESPIRATORY (INHALATION) at 15:25

## 2018-01-11 RX ADMIN — OSELTAMIVIR PHOSPHATE 75 MG: 75 CAPSULE ORAL at 08:43

## 2018-01-11 RX ADMIN — WARFARIN SODIUM 7.5 MG: 7.5 TABLET ORAL at 17:55

## 2018-01-11 RX ADMIN — ONDANSETRON 4 MG: 2 INJECTION INTRAMUSCULAR; INTRAVENOUS at 21:23

## 2018-01-11 RX ADMIN — HYDROMORPHONE HYDROCHLORIDE 0.5 MG: 1 INJECTION, SOLUTION INTRAMUSCULAR; INTRAVENOUS; SUBCUTANEOUS at 10:58

## 2018-01-11 RX ADMIN — HYDROMORPHONE HYDROCHLORIDE 0.5 MG: 1 INJECTION, SOLUTION INTRAMUSCULAR; INTRAVENOUS; SUBCUTANEOUS at 01:10

## 2018-01-11 RX ADMIN — PIPERACILLIN AND TAZOBACTAM 3.38 G: 3; .375 INJECTION, POWDER, FOR SOLUTION INTRAVENOUS at 20:23

## 2018-01-11 RX ADMIN — ACETAMINOPHEN 650 MG: 325 TABLET, FILM COATED ORAL at 20:20

## 2018-01-11 RX ADMIN — HYDROMORPHONE HYDROCHLORIDE 0.5 MG: 1 INJECTION, SOLUTION INTRAMUSCULAR; INTRAVENOUS; SUBCUTANEOUS at 07:34

## 2018-01-11 RX ADMIN — OSELTAMIVIR PHOSPHATE 75 MG: 75 CAPSULE ORAL at 20:19

## 2018-01-11 RX ADMIN — VANCOMYCIN HYDROCHLORIDE 2000 MG: 5 INJECTION, POWDER, LYOPHILIZED, FOR SOLUTION INTRAVENOUS at 13:10

## 2018-01-11 RX ADMIN — IPRATROPIUM BROMIDE AND ALBUTEROL SULFATE 3 ML: .5; 3 SOLUTION RESPIRATORY (INHALATION) at 19:01

## 2018-01-11 ASSESSMENT — ENCOUNTER SYMPTOMS
NAUSEA: 0
ABDOMINAL PAIN: 1
FOCAL WEAKNESS: 0
DIARRHEA: 0
DIZZINESS: 0
COUGH: 1
DOUBLE VISION: 0
BLURRED VISION: 0
PALPITATIONS: 0
CHILLS: 0
VOMITING: 0
SHORTNESS OF BREATH: 1
HEADACHES: 0
FEVER: 0

## 2018-01-11 ASSESSMENT — PAIN SCALES - GENERAL
PAINLEVEL_OUTOF10: 4
PAINLEVEL_OUTOF10: 6
PAINLEVEL_OUTOF10: 7
PAINLEVEL_OUTOF10: 3
PAINLEVEL_OUTOF10: 3
PAINLEVEL_OUTOF10: 5
PAINLEVEL_OUTOF10: 2
PAINLEVEL_OUTOF10: 4
PAINLEVEL_OUTOF10: 3

## 2018-01-11 ASSESSMENT — LIFESTYLE VARIABLES: DO YOU DRINK ALCOHOL: NO

## 2018-01-11 NOTE — FLOWSHEET NOTE
01/11/18 0645   Events/Summary/Plan   Events/Summary/Plan Decreased O2 to 4L, tolerating well   Non-Invasive Resp Device Site Inspection Completed Intact   Chest Exam   Respiration (!) 25   Pulse 74   Heart Rate (Monitored) 87   Oxygen   Pulse Oximetry 93 %   O2 (LPM) 4   O2 Daily Delivery Respiratory  Silicone Nasal Cannula

## 2018-01-11 NOTE — PROGRESS NOTES
[facility] Hospitalist Progress Note    Date of Service: 1/10/2018    Chief Complaint  47 y.o. male admitted 2018 with CVA, CAD, HTN, apfib, pacemaker, TRIP who presented with acute cholecystitis and influenza B.     Interval Problem Update  Continues to have RUQ pain, on Zosyn. Cholecystectomy with Dr. Mcallister tomorrow.  CXR with edema. Hypoxia on 3-4L O2. On Tamiflu  TTE pending  INR 1.67 after vit K. Heparin bridge for mech valve until surgery.   pt on 4Lof o2, received 40mg lasix with good response, repeated cxr showed b/l infiltrate probably infectious, added vancomycin due to risk for MRSA infection due to + influenza, discussed with Dr Mcallister surgery regarding new finding he stated that patient is very symptomatic from his infected gallbladder and will benefit from surgery, patient will need very close monitoring during and after surgery.   Condition guarded.   1/10 s/p lap madhavi on , still feeling sob but is better, c/o abdominal pain surgical site. Had fever today, will repeat cxr in am.     Consultants/Specialty  General surgery    Disposition  TBD.         Review of Systems   Constitutional: Negative for chills and fever.   HENT: Negative for tinnitus.    Eyes: Negative for blurred vision and double vision.   Respiratory: Positive for cough and shortness of breath (improved. ).    Cardiovascular: Negative for chest pain and palpitations.   Gastrointestinal: Positive for abdominal pain (surgical site. ). Negative for diarrhea, nausea and vomiting.   Genitourinary: Negative for dysuria.   Skin: Negative for itching.   Neurological: Negative for dizziness, focal weakness and headaches.      Physical Exam  Laboratory/Imaging   Hemodynamics  Temp (24hrs), Av.2 °C (98.9 °F), Min:36.4 °C (97.6 °F), Max:38.8 °C (101.8 °F)   Temperature: 37.3 °C (99.1 °F)  Pulse  Av.5  Min: 58  Max: 104 Heart Rate (Monitored): 80  NIBP: 118/64      Respiratory      Respiration: (!) 23, Pulse Oximetry: 96 %, O2 Daily  Delivery Respiratory : Silicone Nasal Cannula     Given By:: Mouthpiece, Work Of Breathing / Effort: Shallow;Mild  RUL Breath Sounds: Expiratory Wheezes, RML Breath Sounds: Expiratory Wheezes, RLL Breath Sounds: Diminished, DELORES Breath Sounds: Expiratory Wheezes, LLL Breath Sounds: Diminished    Fluids    Intake/Output Summary (Last 24 hours) at 01/10/18 1757  Last data filed at 01/10/18 1700   Gross per 24 hour   Intake             4190 ml   Output             2550 ml   Net             1640 ml       Nutrition  Orders Placed This Encounter   Procedures   • DIET ORDER     Standing Status:   Standing     Number of Occurrences:   1     Order Specific Question:   Diet:     Answer:   Full Liquid [11]     Physical Exam   Constitutional: He is oriented to person, place, and time. He is cooperative. No distress.   HENT:   Mouth/Throat: No oropharyngeal exudate.   Eyes: Conjunctivae and EOM are normal.   Cardiovascular: Normal rate and regular rhythm.    Systolic click   Pulmonary/Chest: Effort normal. He has wheezes. He has rales.   Decreased breath sound at bases   Abdominal: Soft. Bowel sounds are normal. There is tenderness (RUQ tenderness). There is no rebound and no guarding.   Musculoskeletal: He exhibits no edema.   Neurological: He is alert and oriented to person, place, and time.   Skin: No erythema.   Nursing note and vitals reviewed.      Recent Labs      01/08/18 0450 01/09/18 0420  01/10/18   0125  01/10/18   1047  01/10/18   1649   WBC  6.8  7.6  9.8   --    --    RBC  4.06*  3.90*  3.49*   --    --    HEMOGLOBIN  12.3*  12.0*  10.6*  11.3*  11.1*   HEMATOCRIT  36.2*  34.2*  30.4*  32.4*  31.5*   MCV  89.2  87.7  87.1   --    --    MCH  30.3  30.8  30.4   --    --    MCHC  34.0  35.1  34.9   --    --    RDW  43.6  42.5  41.7   --    --    PLATELETCT  120*  117*  139*   --    --    MPV  9.9  9.7  10.1   --    --      Recent Labs      01/08/18   0450  01/09/18   0420  01/10/18   0125   SODIUM  137  134*   134*   POTASSIUM  4.2  4.2  4.0   CHLORIDE  104  101  102   CO2  25  24  26   GLUCOSE  103*  116*  132*   BUN  14  19  16   CREATININE  1.12  0.82  0.85   CALCIUM  8.2*  8.3*  7.6*     Recent Labs      01/08/18   0450   01/08/18   2127  01/09/18   0420  01/09/18   1106  01/10/18   1047   APTT   --    < >  120.1*  96.6*  64.6*   --    INR  1.67*   --    --   1.40*   --   1.31*    < > = values in this interval not displayed.     Recent Labs      01/09/18   0420   BNPBTYPENAT  108*              Assessment/Plan     * Acute cholecystitis- (present on admission)   Assessment & Plan    Rising LFTs. US gallbladder showed cholelithiasis with wall thickening. No signs of sepsis.  - continue zosyn  - cholecystectomy with Dr. Mcallister tomorrow 6pm  - clear diet, NPO after breakfast tomorrow  - pain control  - blood cultures neg  For surgery today.  S/p lap madhavi on 1/9, did well. Pain better controlled today.         SOB (shortness of breath)- (present on admission)   Assessment & Plan    Mild interstitial markings on CXR with symptoms of PND. Only trace LE edema and no JVD present. Last echo was done in Sept 2016 showed EF 60% and moderate mitral stenosis. Influenza B is positive.  - limit IVF as possible  - repeat echo  - tamiflu  cxr on 1/9 showing worsening infiltrates, possible pneumonia vs ARDS, on vanco, zosyn. o2 per protocol.   1/10 procalcitonin is elevated, continue vanc and zosyn for now, repeat cxr in am.         Chronic anticoagulation- (present on admission)   Assessment & Plan    pAFib and h/o mechanical aortic valve replacement. INR goal 2.5-3.5. But plans for surgery tomorrow night. INR 1.6 after vit K.   - given risks of thrombosis with mech valve and pAfib, will bridge on heparin until surgery tomorrow  Will restart heparin and warfarin when ok with surgery.   1/10 as per surgery ok to start warfarin today but wants to hold on full A/C until hb stable. Will keep monitoring if INR not therapeutic tomorrow and hb  stable will probably restart heparin drip if ok with surgery.         Influenza B- (present on admission)   Assessment & Plan    Positive swab. Symptoms have been improving, per patient.   - given hypoxia, started on tamiflu  Now probably complicated with bacterial pneumonia, added vancomycin since there is risk for MRSA infection. Continue zosyn.   Stable .        TRIP on CPAP- (present on admission)   Assessment & Plan    Home CPAP at bedside.        Hypertension- (present on admission)   Assessment & Plan    Normotensive. Currently not on any home medications.   continue to monitor              Reviewed items::  Labs reviewed, Medications reviewed and Radiology images reviewed  Bhatia catheter::  No Bhatia  DVT prophylaxis pharmacological::  Warfarin (Coumadin)  Antibiotics:  Treating active infection/contamination beyond 24 hours perioperative coverage

## 2018-01-11 NOTE — FLOWSHEET NOTE
01/11/18 0633   Events/Summary/Plan   Events/Summary/Plan Tx given. IS done   Non-Invasive Resp Device Site Inspection Completed Intact   Interdisciplinary Plan of Care-Goals (Indications)   Obstructive Ventilatory Defect or Pulmonary Disease without Obvious Obstruction Physical Exam / Hyperinflation / Wheezing (bronchospasm)   Hyperinflation Protocol Indications Pre or Post-op Abdominal, Thoracic or Orthopedic Surgery   Interdisciplinary Plan of Care-Outcomes    Hyperinflation Protocol Goals/Outcome Improvement in Repeat CXR;Increased Vital Capacity or Return to Pre-operative Values   Education   Education Yes - Pt. / Family has been Instructed in use of Respiratory Medications and Adverse Reactions;Yes - Pt. / Family has been Instructed in use of Respiratory Equipment   RT Assessment of Delivered Medications   Evaluation of Medication Delivery Daily Yes-- Pt /Family has been Instructed in use of Respiratory Medications and Adverse Reactions   SVN Group   #SVN Performed Yes   Given By: Mouthpiece   Incentive Spirometry Group   Incentive Spirometry Instruction Yes   Breathing Exercises Yes   Incentive Spirometer Volume 1750 mL   Respiratory WDL   Respiratory (WDL) X   Chest Exam   Respiration (!) 21   Pulse 71   Heart Rate (Monitored) 77   Breath Sounds   Pre/Post Intervention Post Intervention Assessment   RUL Breath Sounds Clear   RML Breath Sounds Diminished   RLL Breath Sounds Diminished   DELORES Breath Sounds Clear;Diminished   LLL Breath Sounds Diminished   Secretions   Cough Non Productive;Strong   How Sputum Obtained Spontaneous   Oximetry   Continuous Oximetry Yes   Oxygen   Home O2 Use Prior To Admission? No   Pulse Oximetry 95 %   O2 (LPM) 5   O2 Daily Delivery Respiratory  Silicone Nasal Cannula

## 2018-01-11 NOTE — FLOWSHEET NOTE
01/11/18 1154   Events/Summary/Plan   Events/Summary/Plan Decreased O2 to 3L tolerating well   Non-Invasive Resp Device Site Inspection Completed Intact   Chest Exam   Respiration 16   Pulse 78   Heart Rate (Monitored) 79   Oxygen   Pulse Oximetry 95 %   O2 (LPM) 3   O2 Daily Delivery Respiratory  Silicone Nasal Cannula

## 2018-01-11 NOTE — CARE PLAN
Problem: Oxygenation:  Goal: Maintain adequate oxygenation dependent on patient condition    Intervention: Manage oxygen therapy by monitoring pulse oximetry and/or ABG values  Pt use CPAP at night but no bleed in. Pt current FIO2 is 4L NC during the day. Will monitor pt and wean O2 to baseline but keep sats >90%

## 2018-01-11 NOTE — CARE PLAN
Problem: Pain Management  Goal: Pain level will decrease to patient's comfort goal    Intervention: Follow pain managment plan developed in collaboration with patient and Interdisciplinary Team  Pt medicated per MD orders with IV dilaudid, 1-10 scale in use, pain medication effective at this time.      Problem: Respiratory:  Goal: Respiratory status will improve    Intervention: Educate and encourage coughing and deep breathing  Pt encouraged to TCDB, using incentive spirometer now at 1500

## 2018-01-11 NOTE — FLOWSHEET NOTE
01/10/18 2150   Events/Summary/Plan   Events/Summary/Plan SVN with mp f/w home cpap   Interdisciplinary Plan of Care-Goals (Indications)   Obstructive Ventilatory Defect or Pulmonary Disease without Obvious Obstruction Physical Exam / Hyperinflation / Wheezing (bronchospasm)   Interdisciplinary Plan of Care-Outcomes    Bronchodilator Outcome Diminished Wheezing and Volume of Air Movement Increased   Education   Education Yes - Pt. / Family has been Instructed in use of Respiratory Medications and Adverse Reactions   RT Assessment of Delivered Medications   Evaluation of Medication Delivery Daily Yes-- Pt /Family has been Instructed in use of Respiratory Medications and Adverse Reactions   SVN Group   #SVN Performed Yes   Given By: Mouthpiece   Chest Exam   Work Of Breathing / Effort Mild   Respiration 18   Pulse 88   Breath Sounds   RUL Breath Sounds Diminished;Expiratory Wheezes   RML Breath Sounds Diminished;Expiratory Wheezes   RLL Breath Sounds Diminished   DELORES Breath Sounds Diminished;Expiratory Wheezes   LLL Breath Sounds Diminished   Secretions   Cough Non Productive   How Sputum Obtained Spontaneous   Oximetry   Continuous Oximetry Yes   Oxygen   Home O2 Use Prior To Admission? No   O2 (LPM) 4   O2 Daily Delivery Respiratory  Silicone Nasal Cannula

## 2018-01-11 NOTE — PROGRESS NOTES
1300 Pt seen by Dr Guillaume, Heart rhythm, alarm strips and pacemaker activity all reviewed with Dr Guillaume. Pacemaker company called to interrogate.  1330 Pacemaker interrogation by [facility] tech in progress at bedside, underlying rhythm confirmed to be SR with frequent ectopy, PVC's and PAC's. Dr Guillaume to be called by tech to report findings. New IV started to right FA, IV ABX infusing as ordered. Pt now on 3 L NC.

## 2018-01-11 NOTE — PROGRESS NOTES
Report received from NOC, Pt up in chair, alert and roro, 02 at 4 L NC, HR Afib with V pacing notable on monitor, HR 80's, BP stable, Medicated for pain in abdomen per MAR, POC for day reviewed with Pt, Pt able to stand and use urinal safely, call light within reach. Safety and fall precautions in place.

## 2018-01-11 NOTE — PROGRESS NOTES
Pulmonary Critical Care Progress Note        Chief Complaint: Shortness of breath, low back pain, and abdominal pain,    Reason for Consultation: Respiratory failure and an abnormal x-ray    Referred by Dr. Benjie Pina    History of Present Illness:     47 y.o. male admitted for  treatment of acute cholecystitis and influenza B.    ROS:  Respiratory: Positive for shortness of breath , Cardiac: Negative , GI: positive abdominal pain.  All other systems negative.    Interval Events:  24 hour interval history reviewed    1/11 -Dr. Rodney Mcallister performed a laparoscopic cholecystectomy on 1/9/2018 for acute cholecystitis. The patient was noted to have hepatic congestion at the time of the procedure. Postoperatively, the patient has required oxygen therapy for respiratory failure and his x-ray shown diffuse left greater than right-sided pulmonary infiltrates. A 18 echo showed that he had an ejection fraction of 65%, a prior mitral valve repair, and RVSP of 65 mmHg, and possibly abnormal diastolic function. He is +1.2 L this admission and -650 mL the last 24 hours.      He is white count today is 8700, hemoglobin 9.8, hematocrit 28.6. His chemistries today show sodium of 133, potassium of 3.5, and SGOT of 89, and SGPT of 60, a total bilirubin of 15.1, and an albumin of 2.4. His INR today is 1.46. His pro calcitonin yesterday was elevated at 2.74. His path specimen showed:Mild chronic and very focal acute cholecystitis with cholelithiasis. Blood cultures on admission were negative and his influenza B was positive. Influenza A was negative. He is currently on a regimen of Zosyn, vancomycin, and oseltamivir.    Regarding his  sleep apnea syndrome, his sleep study was performed on 2/13/17 and showed an apnea hypopnea index of 125.2, a mean event duration of 16.2 seconds, the longest event lasting 24.4 seconds. Lowest saturation during sleep was 72% and saturations were less than 90% for 40% of the diagnostic  "recording. Most of the patient's events were central apneas. He was ultimately placed on auto CPAP 10-15 cmH2O.  Subsequent compliance downloads revealed an average AHI of only 1.1. Therefore there was no need for an adaptive servo ventilation titration. He was last seen at the [facility] on 5/9/17.        Past Medical History:   Stroke (CMS-HCC) 2010     TIA- no deficits   • Congestive heart failure (CMS-HCC) 2/2007     complication following heart surgery, resolved now   • Myocardial infarct 11/2006   • Anticoagulation monitoring, special range     • Breath shortness     • Hyperlipoproteinemia     • Hypertension     • Pacemaker       AICD   • Paroxysmal atrial fibrillation (CMS-HCC)     • Sleep apnea       CPAP   • Snoring        Surgical History        Past Surgical History:   Procedure Laterality Date   • AORTIC VALVE REPLACEMENT         2007   • AORTIC VALVE REPLACEMENT         mechanical   • MITRAL VALVE REPAIR       • PACEMAKER INSERTION       Social History       Social History   Substance Use Topics   • Smoking status: Never Smoker   • Smokeless tobacco: Never Used   • Alcohol use Yes   Denies tobacco or illicit drug use. Occasional alcohol use.    Allergies        Allergies   Allergen Reactions   • Lactose Diarrhea       Pt states \"I get diarrhea and upset stomach\".          Physical Exam   Constitutional: He is oriented to person, place, and time. He is cooperative. No distress.   HENT:   Mouth/Throat: No oropharyngeal exudate.   Eyes: Conjunctivae and EOM are normal.   Cardiovascular: Normal rate and regular rhythm.    1/6 DEBORAH   Pulmonary/Chest: Effort normal.    He has rales. Right > left base.  Decreased breath sound at bases   Abdominal: Soft. Bowel sounds are normal. There is tenderness (RUQ tenderness). There is no rebound and no guarding.   Musculoskeletal: He exhibits no edema.   Neurological: He is alert and oriented to person, place, and time.   Skin: No erythema.   Nursing note and vitals " reviewed.           PFSH:  No change.    Respiratory:     Pulse Oximetry: 95 %  Chest Tube Drains:          Exam: unlabored respirations, no intercostal retractions or accessory muscle use  ImagingAvailable data reviewed         Invalid input(s): [id number]    HemoDynamics:  Pulse: 78, Heart Rate (Monitored): 79  NIBP: 142/80       Exam: regular rate and rhythm  Imaging: Available data reviewed  Recent Labs      01/09/18   0420   BNPBTYPENAT  108*       Neuro:  GCS Total [place] Coma Score: 15       Exam: no focal deficits noted  Imaging: Available data reviewed    Fluids:  Intake/Output       01/09/18 0700 - 01/10/18 0659 01/10/18 0700 - 01/11/18 0659 01/11/18 0700 - 01/12/18 0659      [id number] 8355-6881 Total 9677-1473 3948-3202 Total 0925-6423 4457-1132 Total       Intake    P.O.  --  720 720  660  440 1100  480  -- 480    P.O. -- 720 720  480 -- 480    I.V.  --  2700 2700  350  350 700  --  -- --    Crystalloid Intake -- 2000 2000 -- -- -- -- -- --    IV Volume (Zosyn) -- 200 200 100 100 200 -- -- --    IV Volume (Vancomycin) -- 500 500 250 250 500 -- -- --    Total Intake -- 3420 3420 9746 635 7472 480 -- 480       Output    Urine  1400  900 2300  1650  800 2450  700  -- 700    Number of Times Voided -- -- -- -- 6 x 6 x 1 x -- 1 x    Void (ml) 3910 867 5675 6700 503 5343 700 -- 700    Stool  --  -- --  --  -- --  --  -- --    Number of Times Stooled -- 1 x 1 x -- 1 x 1 x -- -- --    Total Output 0634 460 2719 1955 485 1713 700 -- 700       Net I/O     -1400 2520 1120 -640 -10 -650 -220 -- -220           Recent Labs      01/09/18   0420  01/10/18   0125  01/11/18   0414   SODIUM  134*  134*  133*   POTASSIUM  4.2  4.0  3.5*   CHLORIDE  101  102  98   CO2  24  26  27   BUN  19  16  15   CREATININE  0.82  0.85  0.92   MAGNESIUM   --    --   2.0   CALCIUM  8.3*  7.6*  7.5*       GI/Nutrition:  Exam: abdomen is soft and non-tender  Imaging: Available data reviewed  taking PO  Liver Function  Recent  Labs      01/09/18   0420  01/10/18   01218   0414   ALTSGPT  45  60*  60*   ASTSGOT  37  75*  89*   ALKPHOSPHAT  43  38  37   TBILIRUBIN  9.1*  12.6*  15.1*   GLUCOSE  116*  132*  120*       Heme:  Recent Labs      18   2127   18   0420  18   1106  01/10/18   0125  01/10/18   1047  01/10/18   1649  18   0414  18   1124   RBC   --    --   3.90*   --   3.49*   --    --   3.27*   --    HEMOGLOBIN   --    < >  12.0*   --   10.6*  11.3*  11.1*  9.8*  10.2*   HEMATOCRIT   --    < >  34.2*   --   30.4*  32.4*  31.5*  28.6*  29.6*   PLATELETCT   --    --   117*   --   139*   --    --   148*   --    PROTHROMBTM   --    --   16.7*   --    --   15.9*   --   17.3*   --    APTT  120.1*   --   96.6*  64.6*   --    --    --    --    --    INR   --    --   1.40*   --    --   1.31*   --   1.46*   --     < > = values in this interval not displayed.       Infectious Disease:  Temp  Av.6 °C (99.6 °F)  Min: 37.1 °C (98.7 °F)  Max: 38.4 °C (101.1 °F)  Micro: reviewed  Recent Labs      01/09/18   0420  01/10/18   0125  01/11/18   0414   WBC  7.6  9.8  8.7   NEUTSPOLYS  76.90*  81.60*  76.90*   LYMPHOCYTES  9.20*  5.80*  8.10*   MONOCYTES  10.50  8.00  10.50   EOSINOPHILS  0.40  0.00  0.60   BASOPHILS  0.40  0.60  0.20   ASTSGOT  37  75*  89*   ALTSGPT  45  60*  60*   ALKPHOSPHAT  43  38  37   TBILIRUBIN  9.1*  12.6*  15.1*     Current Facility-Administered Medications   Medication Dose Frequency Provider Last Rate Last Dose   • furosemide (LASIX) injection 40 mg  40 mg BID DIURETIC Benjie Pina M.D.       • potassium chloride SA (Kdur) tablet 40 mEq  40 mEq DAILY Benjie Pina M.D.   40 mEq at 18 1100   • warfarin (COUMADIN) tablet 7.5 mg  7.5 mg COUMADIN-ONCE Benjie Pina M.D.       • albuterol (PROVENTIL) 2.5mg/0.5ml nebulizer solution 2.5 mg  2.5 mg Q4H PRN (RT) Benjie Pina M.D.   2.5 mg at 01/10/18 0731   • MD ALERT... warfarin (COUMADIN) per  pharmacy protocol   pharmacy to dose Benjie Pina M.D.       • ipratropium-albuterol (DUONEB) nebulizer solution 3 mL  3 mL Q4HRS (RT) Benjie Pina M.D.   3 mL at 01/11/18 1130   • vancomycin (VANCOCIN) 2,000 mg in  mL IVPB  2,000 mg Q12HR Benjie Pina M.D.   Stopped at 01/11/18 0310   • Pharmacy Consult Request ...Pain Management Review   PRN Benjie Pina M.D.        And   • oxycodone immediate-release (ROXICODONE) tablet 2.5 mg  2.5 mg Q3HRS PRN Benjie Pina M.D.        And   • oxycodone immediate-release (ROXICODONE) tablet 5 mg  5 mg Q3HRS PRN Benjie Pina M.D.   5 mg at 01/10/18 0734    And   • HYDROmorphone (DILAUDID) injection 0.5 mg  0.5 mg Q3HRS PRN Benjie Pina M.D.   0.5 mg at 01/11/18 1058   • MD ALERT... vancomycin per pharmacy protocol   pharmacy to dose Benjie Pina M.D.       • Respiratory Care per Protocol   Continuous RT Rodney Mcallister M.D.       • oseltamivir (TAMIFLU) capsule 75 mg  75 mg Q12HRS Ilan Dos Santos M.D.   75 mg at 01/11/18 0843   • senna-docusate (PERICOLACE or SENOKOT S) 8.6-50 MG per tablet 2 Tab  2 Tab BID Kalie Ziegler M.D.   2 Tab at 01/11/18 0843    And   • polyethylene glycol/lytes (MIRALAX) PACKET 1 Packet  1 Packet QDAY PRN Kalie Ziegler M.D.   1 Packet at 01/09/18 0517    And   • magnesium hydroxide (MILK OF MAGNESIA) suspension 30 mL  30 mL QDAY PRN Kalie Ziegler M.D.        And   • bisacodyl (DULCOLAX) suppository 10 mg  10 mg QDAY PRN Kalie Ziegler M.D.       • labetalol (NORMODYNE,TRANDATE) injection 10 mg  10 mg Q4HRS PRN Kalie Ziegler M.D.       • zolpidem (AMBIEN) tablet 5 mg  5 mg HS PRN - MR X 1 Kalie Ziegler M.D.       • acetaminophen (TYLENOL) tablet 650 mg  650 mg Q6HRS PRN Kalie Ziegler M.D.   650 mg at 01/11/18 0115   • NS (BOLUS) infusion 500 mL  500 mL Once PRN Kalie Ziegler M.D.       • piperacillin-tazobactam (ZOSYN) 3.375 g in  mL IVPB   3.375 g Q8HRS Kalie Ziegler M.D.   Stopped at 01/11/18 0925     Last reviewed on 1/7/2018 10:45 AM by Christopher Feliz    Quality  Measures:  Core Measures & Quality Metrics    Problems:  Influenza B infection  Acute cholecystitis and cholelithiasis status post laparoscopic cholecystectomy  Respiratory failure  Pneumonia  Central sleep apnea syndrome but adequately treated with auto titrating CPAP 10-15 cm water   Anemia  Electrolyte disturbance  Hyperglycemia  Abnormal liver function  Protein calorie malnutrition  History of prior CVA  History of permanent pacemaker placemen,t history of mitral valve repair,  history of aortic valve replacement        Plan:  The patient's pulmonary infiltrates are likely a consequence of the influenza B infection, possible capillary leak secondary to sepsis, and possibly bacterial superinfection. The present time the patient continues to improve on a regimen of vancomycin, Zosyn, and Tamiflu. He is currently down to 3 L a minute.    Recommend continuing his current anti-infective regimen, O2, RT protocols, and daily at bedtime auto titrating CPAP 10-15 cm water. We will be pleased to follow the patient with you. Recommend correction of metabolic abnormalities, follow chemistries images microbiology and hemogram, continue prophylaxis, continue home medications were appropriate.    No indication for fiberoptic bronchoscopy at the moment. Will follow with you.

## 2018-01-11 NOTE — PROGRESS NOTES
Inpatient Anticoagulation Service Note    Date: 1/11/2018    47/M resuming home warfarin for mechanical aortic valve replacement (INR goal 2.5-3.5).  Patient received two doses of vitamin K 1/7 - 5 mg PO and 5 mg IV.         Hemoglobin Value: (!) 9.8  Hematocrit Value: (!) 28.6  Lab Platelet Value: (!) 148    INR from last 7 days     Date/Time INR Value    01/11/18 0414 (!)  1.46    01/10/18 1047 (!)  1.31    01/09/18 0420 (!)  1.4    01/08/18 0450 (!)  1.67    01/07/18 1015 (!)  3.02        Dose from last 7 days     01/10/18                                                                              Warfarin 7.5 mg           (If less than 5 days and overlap therapy discontinued -- document reason (i.e. Bleed Risk))    (If still on overlap therapy, if No -- document reason (i.e. Bleed Risk)) - awaiting permission from surgery to bridge patient    Plan:  Warfarin 7.5 mg today.  Continue to monitor labs and clinical course     Pharmacist suggested discharge dosing: Warfarin 5 mg PO daily, if clinically appropriate     Moreno Boston, PharmD

## 2018-01-11 NOTE — CARE PLAN
Problem: Safety  Goal: Will remain free from injury    Intervention: Provide assistance with mobility  Help pt to get out of chair and into bed as well as provide assistance when getting up to edge of bed to use urinal. Remind pt to call before getting out of bed on own.      Problem: Infection  Goal: Will remain free from infection    Intervention: Implement standard precautions and perform hand washing before and after patient contact  Wash hands before and after every pt contact and scrub the hub of IV lines

## 2018-01-11 NOTE — FLOWSHEET NOTE
01/11/18 1130   Events/Summary/Plan   Events/Summary/Plan Tx given. IS done   Non-Invasive Resp Device Site Inspection Completed Intact   Interdisciplinary Plan of Care-Goals (Indications)   Obstructive Ventilatory Defect or Pulmonary Disease without Obvious Obstruction Physical Exam / Hyperinflation / Wheezing (bronchospasm)   Hyperinflation Protocol Indications Pre or Post-op Abdominal, Thoracic or Orthopedic Surgery   Interdisciplinary Plan of Care-Outcomes    Hyperinflation Protocol Goals/Outcome Improvement in Repeat CXR;Increased Vital Capacity or Return to Pre-operative Values   Education   Education Yes - Pt. / Family has been Instructed in use of Respiratory Medications and Adverse Reactions   RT Assessment of Delivered Medications   Evaluation of Medication Delivery Daily Yes-- Pt /Family has been Instructed in use of Respiratory Medications and Adverse Reactions   SVN Group   #SVN Performed Yes   Given By: Mouthpiece   Incentive Spirometry Group   Incentive Spirometry Instruction Yes   Breathing Exercises Yes   Incentive Spirometer Volume 1500 mL   Respiratory WDL   Respiratory (WDL) X   Chest Exam   Work Of Breathing / Effort Mild   Respiration 17   Pulse 67   Heart Rate (Monitored) 73   Breath Sounds   Pre/Post Intervention Post Intervention Assessment   RUL Breath Sounds Clear   RML Breath Sounds Diminished   RLL Breath Sounds Clear;Expiratory Wheezes   DELORES Breath Sounds Clear;Expiratory Wheezes   LLL Breath Sounds Diminished   Oximetry   Continuous Oximetry Yes   Oxygen   Pulse Oximetry 98 %   O2 (LPM) 4   O2 Daily Delivery Respiratory  Silicone Nasal Cannula

## 2018-01-11 NOTE — PROGRESS NOTES
Report received from Fouzia MARMOLEJO, POC discussed, walking rounds completed, pt sitting up in chair and has no questions at this time, all orders, medications, and lines verifed, no s/s distress, call light within reach and will continue to monitor.

## 2018-01-11 NOTE — FLOWSHEET NOTE
This note also relates to the following rows which could not be included:  Pulse - Cannot attach notes to unvalidated device data  Heart Rate (Monitored) - Cannot attach notes to unvalidated device data  Pulse Oximetry - Cannot attach notes to unvalidated device data       01/11/18 0247   Events/Summary/Plan   Events/Summary/Plan SVN with MP f/w return to HCPAP   Interdisciplinary Plan of Care-Goals (Indications)   Obstructive Ventilatory Defect or Pulmonary Disease without Obvious Obstruction Physical Exam / Hyperinflation / Wheezing (bronchospasm)   Interdisciplinary Plan of Care-Outcomes    Bronchodilator Outcome Diminished Wheezing and Volume of Air Movement Increased   Education   Education Yes - Pt. / Family has been Instructed in use of Respiratory Medications and Adverse Reactions   RT Assessment of Delivered Medications   Evaluation of Medication Delivery Daily Yes-- Pt /Family has been Instructed in use of Respiratory Medications and Adverse Reactions   SVN Group   #SVN Performed Yes   Given By: Mouthpiece   Date SVN Last Changed 01/10/18   Date SVN Next Change Due (Q 7 Days) 01/17/18   Chest Exam   Work Of Breathing / Effort Mild   Respiration 13   Breath Sounds   RUL Breath Sounds Clear   RML Breath Sounds Clear;Diminished   RLL Breath Sounds Diminished   DELORES Breath Sounds Diminished;Expiratory Wheezes   LLL Breath Sounds Diminished   Secretions   Cough Non Productive   Oximetry   #Pulse Oximetry (Single Determination) Yes   Continuous Oximetry Yes   Oxygen   Home O2 Use Prior To Admission? No   O2 (LP) 5

## 2018-01-11 NOTE — FLOWSHEET NOTE
This note also relates to the following rows which could not be included:  Pulse - Cannot attach notes to unvalidated device data  Heart Rate (Monitored) - Cannot attach notes to unvalidated device data  Pulse Oximetry - Cannot attach notes to unvalidated device data       01/10/18 1851   Events/Summary/Plan   Events/Summary/Plan SVN with IS done in cardiac chair with strong NPC-good effort on IS   Interdisciplinary Plan of Care-Goals (Indications)   Obstructive Ventilatory Defect or Pulmonary Disease without Obvious Obstruction Physical Exam / Hyperinflation / Wheezing (bronchospasm)   Interdisciplinary Plan of Care-Outcomes    Bronchodilator Outcome Diminished Wheezing and Volume of Air Movement Increased   Education   Education Yes - Pt. / Family has been Instructed in use of Respiratory Medications and Adverse Reactions   RT Assessment of Delivered Medications   Evaluation of Medication Delivery Daily Yes-- Pt /Family has been Instructed in use of Respiratory Medications and Adverse Reactions   SVN Group   #SVN Performed Yes   Given By: Mouthpiece   Date SVN Last Changed 01/10/18   Date SVN Next Change Due (Q 7 Days) 01/17/18   Incentive Spirometry Group   Incentive Spirometry Instruction Yes   Breathing Exercises Yes   Incentive Spirometer Volume 1750 mL   Incentive Spirometer Date Last Changed 01/10/18   Incentive Spirometer Next Change Date (Q 30 Days) 02/10/18   Chest Exam   Work Of Breathing / Effort Mild;Shallow   Respiration 14   Breath Sounds   RUL Breath Sounds Diminished   RML Breath Sounds Diminished;Expiratory Wheezes   RLL Breath Sounds Diminished   DELORES Breath Sounds Diminished;Expiratory Wheezes   LLL Breath Sounds Diminished   Secretions   Cough Non Productive   Oxygen   Home O2 Use Prior To Admission? No   O2 (LPM) 4   O2 Daily Delivery Respiratory  Silicone Nasal Cannula

## 2018-01-11 NOTE — PROGRESS NOTES
Report to Luba MARMOLEJO, all orders, labs, meds, and POC reviewed. Pt sitting up in chair in stable condition.

## 2018-01-11 NOTE — PROGRESS NOTES
hida normal    no leak   Prompt CBD drainage   hyperbilirubinemia probably secondary to hepatic congestion   gb pathology with only focal acute cholecystitis   Wounds ok   ADAT

## 2018-01-11 NOTE — PROGRESS NOTES
"Pharmacy Kinetics 47 y.o. male on vancomycin day # 3 2018    Currently on Vancomycin 2000 mg iv q12hr    Indication for Treatment: PNA    Pertinent history per medical record: Admitted on 2018 for acute cholecystitis, influenza B.    Other antibiotics: Zosyn, Tamiflu    Allergies: Lactose     List concerns for renal function (possible concerns include abnormal LFTs, BUN/SCr ratio > 20:1, CHF, obesity, malnutrition/low albumin, hypermetabolic state (SIRS), pressors/hypotension, nephrotoxic drugs, etc.): obesity    Pertinent cultures to date:    BCx NGTD, Influenza B positive (PCR)    Recent Labs      18   0420  01/10/18   0125  18   0414   WBC  7.6  9.8  8.7   NEUTSPOLYS  76.90*  81.60*  76.90*     Recent Labs      18   0420  01/10/18   0125  18   0414   BUN  19  16  15   CREATININE  0.82  0.85  0.92   ALBUMIN  3.5  2.6*  2.4*     Recent Labs      01/10/18   1047   VANCOTROUGH  7.3*     Intake/Output Summary (Last 24 hours) at 18 1402  Last data filed at 18 1200   Gross per 24 hour   Intake             1630 ml   Output             2350 ml   Net             -720 ml      Blood pressure 136/87, pulse 66, temperature 37.7 °C (99.8 °F), resp. rate (!) 23, height 1.727 m (5' 8\"), weight 113.6 kg (250 lb 7.1 oz), SpO2 93 %. Temp (24hrs), Av.7 °C (99.8 °F), Min:37.1 °C (98.7 °F), Max:38.4 °C (101.1 °F)      A/P   1. Vancomycin dose change: Continue vancomycin 2000 mg IV q12h ()  2. Next vancomycin level:  @ 0030  3. Goal trough: 16-20 mcg/ml  4. Comments: Will continue to follow labs and clinical course    Moreno Boston, PharmD    "

## 2018-01-11 NOTE — TELEPHONE ENCOUNTER
Dr. Solomon requests device check in ICU at [facility].  Notified Nataly from [facility].

## 2018-01-11 NOTE — PROGRESS NOTES
Pt seen by Dr Pina, Lap sites with redness to lower mid stab site observed by MD, orders and labs reviewed with MS. Pt aware and informed of HIDA scan this a.m. Requirements for test discussed with [email or web address] tech, r/t pt having clears and pain meds this a.m. OK'd by tech for testing for leak only , these requirements are not necessary. Pt VU of POC and test for today.

## 2018-01-11 NOTE — CARE PLAN
Problem: Oxygenation:  Goal: Maintain adequate oxygenation dependent on patient condition  Outcome: PROGRESSING AS EXPECTED  Patient using 5 LPM with home cpap tonight: last night was requiring higher FI02 for Sp02>90%  Intervention: Manage oxygen therapy by monitoring pulse oximetry and/or ABG values  Patient being monitored continuously, is in the ICU  Intervention: Levels of oxygenation will improve to baseline  Patient used cpap for sleep prior to this admit but no oxygen supplement      Problem: Bronchoconstriction:  Goal: Improve in air movement and diminished wheezing  Outcome: PROGRESSING AS EXPECTED  MD order for Q4 hourly duoneb txs beginning yesterday with improvement in aeration and decreased wheezing  Intervention: Evaluate and manage medication effects  Patient is evaluated prior and after duo-nebs

## 2018-01-11 NOTE — CARE PLAN
Problem: Bronchoconstriction:  Goal: Improve in air movement and diminished wheezing    Intervention: Implement inhaled treatments  Pt doesn't use breathing tx at home. Pt BS was wheezy and MD placed an order for Duoneb Q4.

## 2018-01-11 NOTE — FLOWSHEET NOTE
01/11/18 1525   Events/Summary/Plan   Events/Summary/Plan Tx given   Non-Invasive Resp Device Site Inspection Completed Intact   Interdisciplinary Plan of Care-Goals (Indications)   Obstructive Ventilatory Defect or Pulmonary Disease without Obvious Obstruction Physical Exam / Hyperinflation / Wheezing (bronchospasm)   Hyperinflation Protocol Indications Pre or Post-op Abdominal, Thoracic or Orthopedic Surgery   Interdisciplinary Plan of Care-Outcomes    Bronchodilator Outcome Diminished Wheezing and Volume of Air Movement Increased   Hyperinflation Protocol Goals/Outcome Improvement in Repeat CXR;Increased Vital Capacity or Return to Pre-operative Values   Education   Education Yes - Pt. / Family has been Instructed in use of Respiratory Medications and Adverse Reactions;Yes - Pt. / Family has been Instructed in use of Respiratory Equipment   RT Assessment of Delivered Medications   Evaluation of Medication Delivery Daily Yes-- Pt /Family has been Instructed in use of Respiratory Medications and Adverse Reactions   SVN Group   #SVN Performed Yes   Given By: Mouthpiece   PEP/CPT Group   #PEP/CPT (Manual) Initial Initial   PEP/CPT Method Positive Airway Pressure Device   Date Disposable Equipment Last Changed 01/11/18   Date Disposable Equipment Next Change Due (Q 30 Days) 02/10/18   Incentive Spirometry Group   Incentive Spirometry Instruction Yes   Breathing Exercises Yes   Incentive Spirometer Volume 1500 mL   Respiratory WDL   Respiratory (WDL) X   Chest Exam   Work Of Breathing / Effort Mild   Respiration (!) 24   Pulse 77   Heart Rate (Monitored) 75   Breath Sounds   Pre/Post Intervention Post Intervention Assessment   RUL Breath Sounds Clear;Expiratory Wheezes   RML Breath Sounds Diminished   RLL Breath Sounds Diminished   DELORES Breath Sounds Clear;Expiratory Wheezes   LLL Breath Sounds Diminished   Secretions   Cough Non Productive;Strong   How Sputum Obtained Spontaneous   Oximetry   Continuous Oximetry Yes    Oxygen   Home O2 Use Prior To Admission? No   Pulse Oximetry 93 %   O2 (LPM) 3   O2 Daily Delivery Respiratory  Silicone Nasal Cannula

## 2018-01-11 NOTE — PROGRESS NOTES
Called lab to come draw morning labs as well as H&H. Pt a hard stick and unable to obtain draw. Lab confirmed they would draw morning labs.

## 2018-01-11 NOTE — PROGRESS NOTES
Reviewing data   hgb dirft   Bilirubin  continues to climb, get hida to r/o bile leak and CBD obstruction   Most likely due to liver congestion   May be due to right heart failure , consider cardiology consult   Ok to continue coumadin , uncertain about heparinization , lovenox prophyllaxic dosing probably OK  .

## 2018-01-12 LAB
ALBUMIN SERPL BCP-MCNC: 2.5 G/DL (ref 3.2–4.9)
ALP SERPL-CCNC: 46 U/L (ref 30–99)
ALT SERPL-CCNC: 57 U/L (ref 2–50)
ANION GAP SERPL CALC-SCNC: 9 MMOL/L (ref 0–11.9)
AST SERPL-CCNC: 71 U/L (ref 12–45)
BACTERIA BLD CULT: NORMAL
BACTERIA BLD CULT: NORMAL
BASOPHILS # BLD AUTO: 0.3 % (ref 0–1.8)
BASOPHILS # BLD: 0.03 K/UL (ref 0–0.12)
BILIRUB CONJ SERPL-MCNC: 5.9 MG/DL (ref 0.1–0.5)
BILIRUB INDIRECT SERPL-MCNC: 4.8 MG/DL (ref 0–1)
BILIRUB SERPL-MCNC: 10.7 MG/DL (ref 0.1–1.5)
BUN SERPL-MCNC: 14 MG/DL (ref 8–22)
CALCIUM SERPL-MCNC: 7.7 MG/DL (ref 8.4–10.2)
CHLORIDE SERPL-SCNC: 98 MMOL/L (ref 96–112)
CO2 SERPL-SCNC: 28 MMOL/L (ref 20–33)
CREAT SERPL-MCNC: 0.83 MG/DL (ref 0.5–1.4)
EOSINOPHIL # BLD AUTO: 0.22 K/UL (ref 0–0.51)
EOSINOPHIL NFR BLD: 2 % (ref 0–6.9)
ERYTHROCYTE [DISTWIDTH] IN BLOOD BY AUTOMATED COUNT: 44.1 FL (ref 35.9–50)
GLUCOSE SERPL-MCNC: 110 MG/DL (ref 65–99)
HCT VFR BLD AUTO: 28.3 % (ref 42–52)
HCT VFR BLD AUTO: 30.3 % (ref 42–52)
HGB BLD-MCNC: 10.7 G/DL (ref 14–18)
HGB BLD-MCNC: 9.9 G/DL (ref 14–18)
IMM GRANULOCYTES # BLD AUTO: 0.43 K/UL (ref 0–0.11)
IMM GRANULOCYTES NFR BLD AUTO: 4 % (ref 0–0.9)
INR PPP: 1.98 (ref 0.87–1.13)
LYMPHOCYTES # BLD AUTO: 1.04 K/UL (ref 1–4.8)
LYMPHOCYTES NFR BLD: 9.7 % (ref 22–41)
MCH RBC QN AUTO: 30.5 PG (ref 27–33)
MCHC RBC AUTO-ENTMCNC: 35 G/DL (ref 33.7–35.3)
MCV RBC AUTO: 87.1 FL (ref 81.4–97.8)
MONOCYTES # BLD AUTO: 1.18 K/UL (ref 0–0.85)
MONOCYTES NFR BLD AUTO: 11 % (ref 0–13.4)
NEUTROPHILS # BLD AUTO: 7.84 K/UL (ref 1.82–7.42)
NEUTROPHILS NFR BLD: 73 % (ref 44–72)
NRBC # BLD AUTO: 0 K/UL
NRBC BLD-RTO: 0 /100 WBC
PLATELET # BLD AUTO: 190 K/UL (ref 164–446)
PMV BLD AUTO: 9.9 FL (ref 9–12.9)
POTASSIUM SERPL-SCNC: 3.4 MMOL/L (ref 3.6–5.5)
PROT SERPL-MCNC: 5.6 G/DL (ref 6–8.2)
PROTHROMBIN TIME: 22 SEC (ref 12–14.6)
RBC # BLD AUTO: 3.25 M/UL (ref 4.7–6.1)
SIGNIFICANT IND 70042: NORMAL
SIGNIFICANT IND 70042: NORMAL
SITE SITE: NORMAL
SITE SITE: NORMAL
SODIUM SERPL-SCNC: 135 MMOL/L (ref 135–145)
SOURCE SOURCE: NORMAL
SOURCE SOURCE: NORMAL
VANCOMYCIN TROUGH SERPL-MCNC: 10.8 UG/ML (ref 10–20)
WBC # BLD AUTO: 10.7 K/UL (ref 4.8–10.8)

## 2018-01-12 PROCEDURE — 700101 HCHG RX REV CODE 250: Performed by: HOSPITALIST

## 2018-01-12 PROCEDURE — 85610 PROTHROMBIN TIME: CPT

## 2018-01-12 PROCEDURE — 94640 AIRWAY INHALATION TREATMENT: CPT

## 2018-01-12 PROCEDURE — A9270 NON-COVERED ITEM OR SERVICE: HCPCS | Performed by: HOSPITALIST

## 2018-01-12 PROCEDURE — 85025 COMPLETE CBC W/AUTO DIFF WBC: CPT

## 2018-01-12 PROCEDURE — 85018 HEMOGLOBIN: CPT

## 2018-01-12 PROCEDURE — 700102 HCHG RX REV CODE 250 W/ 637 OVERRIDE(OP): Performed by: HOSPITALIST

## 2018-01-12 PROCEDURE — 80076 HEPATIC FUNCTION PANEL: CPT

## 2018-01-12 PROCEDURE — 94668 MNPJ CHEST WALL SBSQ: CPT

## 2018-01-12 PROCEDURE — 80048 BASIC METABOLIC PNL TOTAL CA: CPT

## 2018-01-12 PROCEDURE — 99232 SBSQ HOSP IP/OBS MODERATE 35: CPT | Performed by: HOSPITALIST

## 2018-01-12 PROCEDURE — 87640 STAPH A DNA AMP PROBE: CPT

## 2018-01-12 PROCEDURE — 85014 HEMATOCRIT: CPT

## 2018-01-12 PROCEDURE — 80202 ASSAY OF VANCOMYCIN: CPT

## 2018-01-12 PROCEDURE — 770020 HCHG ROOM/CARE - TELE (206)

## 2018-01-12 PROCEDURE — 700111 HCHG RX REV CODE 636 W/ 250 OVERRIDE (IP): Performed by: INTERNAL MEDICINE

## 2018-01-12 PROCEDURE — 700105 HCHG RX REV CODE 258: Performed by: HOSPITALIST

## 2018-01-12 PROCEDURE — 700102 HCHG RX REV CODE 250 W/ 637 OVERRIDE(OP): Performed by: INTERNAL MEDICINE

## 2018-01-12 PROCEDURE — 94660 CPAP INITIATION&MGMT: CPT

## 2018-01-12 PROCEDURE — 700111 HCHG RX REV CODE 636 W/ 250 OVERRIDE (IP): Performed by: HOSPITALIST

## 2018-01-12 PROCEDURE — A9270 NON-COVERED ITEM OR SERVICE: HCPCS | Performed by: INTERNAL MEDICINE

## 2018-01-12 PROCEDURE — 87641 MR-STAPH DNA AMP PROBE: CPT

## 2018-01-12 RX ORDER — IPRATROPIUM BROMIDE AND ALBUTEROL SULFATE 2.5; .5 MG/3ML; MG/3ML
3 SOLUTION RESPIRATORY (INHALATION)
Status: DISCONTINUED | OUTPATIENT
Start: 2018-01-12 | End: 2018-01-14 | Stop reason: HOSPADM

## 2018-01-12 RX ORDER — WARFARIN SODIUM 5 MG/1
5 TABLET ORAL
Status: COMPLETED | OUTPATIENT
Start: 2018-01-12 | End: 2018-01-12

## 2018-01-12 RX ORDER — IPRATROPIUM BROMIDE AND ALBUTEROL SULFATE 2.5; .5 MG/3ML; MG/3ML
3 SOLUTION RESPIRATORY (INHALATION)
Status: DISCONTINUED | OUTPATIENT
Start: 2018-01-12 | End: 2018-01-12

## 2018-01-12 RX ADMIN — WARFARIN SODIUM 5 MG: 5 TABLET ORAL at 18:10

## 2018-01-12 RX ADMIN — VANCOMYCIN HYDROCHLORIDE 1500 MG: 5 INJECTION, POWDER, LYOPHILIZED, FOR SOLUTION INTRAVENOUS at 10:00

## 2018-01-12 RX ADMIN — FUROSEMIDE 40 MG: 10 INJECTION, SOLUTION INTRAVENOUS at 05:06

## 2018-01-12 RX ADMIN — IPRATROPIUM BROMIDE AND ALBUTEROL SULFATE 3 ML: .5; 3 SOLUTION RESPIRATORY (INHALATION) at 10:28

## 2018-01-12 RX ADMIN — FUROSEMIDE 40 MG: 10 INJECTION, SOLUTION INTRAVENOUS at 16:19

## 2018-01-12 RX ADMIN — HYDROMORPHONE HYDROCHLORIDE 0.5 MG: 1 INJECTION, SOLUTION INTRAMUSCULAR; INTRAVENOUS; SUBCUTANEOUS at 23:34

## 2018-01-12 RX ADMIN — OSELTAMIVIR PHOSPHATE 75 MG: 75 CAPSULE ORAL at 20:06

## 2018-01-12 RX ADMIN — VANCOMYCIN HYDROCHLORIDE 2000 MG: 5 INJECTION, POWDER, LYOPHILIZED, FOR SOLUTION INTRAVENOUS at 01:04

## 2018-01-12 RX ADMIN — IPRATROPIUM BROMIDE AND ALBUTEROL SULFATE 3 ML: .5; 3 SOLUTION RESPIRATORY (INHALATION) at 14:16

## 2018-01-12 RX ADMIN — POTASSIUM CHLORIDE 40 MEQ: 1500 TABLET, EXTENDED RELEASE ORAL at 08:35

## 2018-01-12 RX ADMIN — PIPERACILLIN AND TAZOBACTAM 3.38 G: 3; .375 INJECTION, POWDER, FOR SOLUTION INTRAVENOUS at 20:07

## 2018-01-12 RX ADMIN — VANCOMYCIN HYDROCHLORIDE 1500 MG: 5 INJECTION, POWDER, LYOPHILIZED, FOR SOLUTION INTRAVENOUS at 18:10

## 2018-01-12 RX ADMIN — HYDROMORPHONE HYDROCHLORIDE 0.5 MG: 1 INJECTION, SOLUTION INTRAMUSCULAR; INTRAVENOUS; SUBCUTANEOUS at 06:44

## 2018-01-12 RX ADMIN — STANDARDIZED SENNA CONCENTRATE AND DOCUSATE SODIUM 2 TABLET: 8.6; 5 TABLET, FILM COATED ORAL at 08:35

## 2018-01-12 RX ADMIN — HYDROMORPHONE HYDROCHLORIDE 0.5 MG: 1 INJECTION, SOLUTION INTRAMUSCULAR; INTRAVENOUS; SUBCUTANEOUS at 16:24

## 2018-01-12 RX ADMIN — OSELTAMIVIR PHOSPHATE 75 MG: 75 CAPSULE ORAL at 08:35

## 2018-01-12 RX ADMIN — ONDANSETRON 4 MG: 2 INJECTION INTRAMUSCULAR; INTRAVENOUS at 18:12

## 2018-01-12 RX ADMIN — PIPERACILLIN AND TAZOBACTAM 3.38 G: 3; .375 INJECTION, POWDER, FOR SOLUTION INTRAVENOUS at 05:05

## 2018-01-12 RX ADMIN — IPRATROPIUM BROMIDE AND ALBUTEROL SULFATE 3 ML: .5; 3 SOLUTION RESPIRATORY (INHALATION) at 06:34

## 2018-01-12 RX ADMIN — ENOXAPARIN SODIUM 40 MG: 100 INJECTION SUBCUTANEOUS at 08:35

## 2018-01-12 RX ADMIN — PIPERACILLIN AND TAZOBACTAM 3.38 G: 3; .375 INJECTION, POWDER, FOR SOLUTION INTRAVENOUS at 13:24

## 2018-01-12 ASSESSMENT — PAIN SCALES - GENERAL
PAINLEVEL_OUTOF10: 3
PAINLEVEL_OUTOF10: 5
PAINLEVEL_OUTOF10: 4
PAINLEVEL_OUTOF10: 4
PAINLEVEL_OUTOF10: 2
PAINLEVEL_OUTOF10: 4
PAINLEVEL_OUTOF10: 5

## 2018-01-12 ASSESSMENT — ENCOUNTER SYMPTOMS
NAUSEA: 0
FOCAL WEAKNESS: 0
PALPITATIONS: 0
COUGH: 0
DOUBLE VISION: 0
DEPRESSION: 0
CHILLS: 0
FEVER: 0
VOMITING: 0
ABDOMINAL PAIN: 0
HEADACHES: 0
DIARRHEA: 0
DIZZINESS: 0
SHORTNESS OF BREATH: 0
BLURRED VISION: 0
SHORTNESS OF BREATH: 1

## 2018-01-12 NOTE — PROGRESS NOTES
Pulmonary Critical Care Progress Note        Chief Complaint: Shortness of breath, low back pain, and abdominal pain,    Reason for Consultation: Respiratory failure and an abnormal x-ray    Referred by Dr. Benjie Pina    History of Present Illness:     47 y.o. male admitted for  treatment of acute cholecystitis and influenza B.    ROS:  Respiratory: Positive for shortness of breath , Cardiac: Negative , GI: positive abdominal pain.  All other systems negative.    Interval Events:  24 hour interval history reviewed    1/11 -Dr. Rodney Mcallister performed a laparoscopic cholecystectomy on 1/9/2018 for acute cholecystitis. The patient was noted to have hepatic congestion at the time of the procedure. Postoperatively, the patient has required oxygen therapy for respiratory failure and his x-ray shown diffuse left greater than right-sided pulmonary infiltrates. A 18 echo showed that he had an ejection fraction of 65%, a prior mitral valve repair, and RVSP of 65 mmHg, and possibly abnormal diastolic function. He is +1.2 L this admission and -650 mL the last 24 hours.      He is white count today is 8700, hemoglobin 9.8, hematocrit 28.6. His chemistries today show sodium of 133, potassium of 3.5, and SGOT of 89, and SGPT of 60, a total bilirubin of 15.1, and an albumin of 2.4. His INR today is 1.46. His pro calcitonin yesterday was elevated at 2.74. His path specimen showed:Mild chronic and very focal acute cholecystitis with cholelithiasis. Blood cultures on admission were negative and his influenza B was positive. Influenza A was negative. He is currently on a regimen of Zosyn, vancomycin, and oseltamivir.    Regarding his  sleep apnea syndrome, his sleep study was performed on 2/13/17 and showed an apnea hypopnea index of 125.2, a mean event duration of 16.2 seconds, the longest event lasting 24.4 seconds. Lowest saturation during sleep was 72% and saturations were less than 90% for 40% of the diagnostic  "recording. Most of the patient's events were central apneas. He was ultimately placed on auto CPAP 10-15 cmH2O.  Subsequent compliance downloads revealed an average AHI of only 1.1. Therefore there was no need for an adaptive servo ventilation titration. He was last seen at the sleep Center on 5/9/17.    1/12 - continues on 3 lpm, doing well; minus 1 L last 24 hours; transaminases better, bilirubin better, other labs similar; no cxr today, will repeat in the AM        Past Medical History:   Stroke (CMS-HCC) 2010     TIA- no deficits   • Congestive heart failure (CMS-HCC) 2/2007     complication following heart surgery, resolved now   • Myocardial infarct 11/2006   • Anticoagulation monitoring, special range     • Breath shortness     • Hyperlipoproteinemia     • Hypertension     • Pacemaker       AICD   • Paroxysmal atrial fibrillation (CMS-HCC)     • Sleep apnea       CPAP   • Snoring        Surgical History        Past Surgical History:   Procedure Laterality Date   • AORTIC VALVE REPLACEMENT         2007   • AORTIC VALVE REPLACEMENT         mechanical   • MITRAL VALVE REPAIR       • PACEMAKER INSERTION       Social History       Social History   Substance Use Topics   • Smoking status: Never Smoker   • Smokeless tobacco: Never Used   • Alcohol use Yes   Denies tobacco or illicit drug use. Occasional alcohol use.    Allergies        Allergies   Allergen Reactions   • Lactose Diarrhea       Pt states \"I get diarrhea and upset stomach\".          Physical Exam   Constitutional: He is oriented to person, place, and time. He is cooperative. No distress.   HENT:   Mouth/Throat: No oropharyngeal exudate.   Eyes: Conjunctivae and EOM are normal.   Cardiovascular: Normal rate and regular rhythm.    1/6 DEBORAH   Pulmonary/Chest: Effort normal.    He has rales. Right > left base.  Decreased breath sound at bases   Abdominal: Soft. Bowel sounds are normal. There is tenderness (RUQ tenderness). There is no rebound and no " guarding.   Musculoskeletal: He exhibits no edema.   Neurological: He is alert and oriented to person, place, and time.   Skin: No erythema.   Nursing note and vitals reviewed.           PFSH:  No change.    Respiratory:     Pulse Oximetry: 94 %  Chest Tube Drains:          Exam: unlabored respirations, no intercostal retractions or accessory muscle use  ImagingAvailable data reviewed         Invalid input(s): [id number]    HemoDynamics:  Pulse: 67, Heart Rate (Monitored): 69  NIBP: 136/75       Exam: regular rate and rhythm  Imaging: Available data reviewed        Neuro:  GCS Total [place] Coma Score: 15       Exam: no focal deficits noted  Imaging: Available data reviewed    Fluids:  Intake/Output       01/10/18 0700 - 01/11/18 0659 01/11/18 0700 - 01/12/18 0659 01/12/18 0700 - 01/13/18 0659      6285-1486 6203-6652 Total 6769-5973 7320-1272 Total 9113-0715 3977-4155 Total       Intake    P.O.  660  440 1100  960  240 1200  240  -- 240    P.O.   240 -- 240    I.V.  350  350 700  450  400 850  --  -- --    IV Volume (Zosyn) 100 100 200 200 150 350 -- -- --    IV Volume (Vancomycin) 250 250 500 250 250 500 -- -- --    Total Intake 4152 340 6838 4793 120 1282 240 -- 240       Output    Urine  1650  800 2450  1900  1150 3050  --  -- --    Number of Times Voided -- 6 x 6 x 1 x 3 x 4 x -- -- --    Void (ml) 1782 345 7758 1900 1150 3050 -- -- --    Stool  --  -- --  --  -- --  --  -- --    Number of Times Stooled -- 1 x 1 x 2 x 2 x 4 x -- -- --    Total Output 7912 078 5950 1900 1150 3050 -- -- --       Net I/O     -640 -10 -650 -490 -510 -1000 240 -- 240           Recent Labs      01/10/18   0125  01/11/18   0414  01/12/18   0300   SODIUM  134*  133*  135   POTASSIUM  4.0  3.5*  3.4*   CHLORIDE  102  98  98   CO2  26  27  28   BUN  16  15  14   CREATININE  0.85  0.92  0.83   MAGNESIUM   --   2.0   --    CALCIUM  7.6*  7.5*  7.7*       GI/Nutrition:  Exam: abdomen is soft and  non-tender  Imaging: Available data reviewed  taking PO  Liver Function  Recent Labs      01/10/18   012184  18   0300   ALTSGPT  60*  60*  57*   ASTSGOT  75*  89*  71*   ALKPHOSPHAT  38  37  46   TBILIRUBIN  12.6*  15.1*  10.7*   DBILIRUBIN   --    --   5.9*   GLUCOSE  132*  120*  110*       Heme:  Recent Labs      18   1106   01/10/18   0125  01/10/18   1047   18   0414  18   1124  18   1631  18   0300   RBC   --    --   3.49*   --    --   3.27*   --    --   3.25*   HEMOGLOBIN   --    < >  10.6*  11.3*   < >  9.8*  10.2*  11.4*  9.9*   HEMATOCRIT   --    < >  30.4*  32.4*   < >  28.6*  29.6*  32.3*  28.3*   PLATELETCT   --    --   139*   --    --   148*   --    --   190   PROTHROMBTM   --    --    --   15.9*   --   17.3*   --    --   22.0*   APTT  64.6*   --    --    --    --    --    --    --    --    INR   --    --    --   1.31*   --   1.46*   --    --   1.98*    < > = values in this interval not displayed.       Infectious Disease:  Temp  Av.6 °C (99.7 °F)  Min: 37.1 °C (98.8 °F)  Max: 38.1 °C (100.6 °F)  Micro: reviewed  Recent Labs      01/10/18   01218   0300   WBC  9.8  8.7  10.7   NEUTSPOLYS  81.60*  76.90*  73.00*   LYMPHOCYTES  5.80*  8.10*  9.70*   MONOCYTES  8.00  10.50  11.00   EOSINOPHILS  0.00  0.60  2.00   BASOPHILS  0.60  0.20  0.30   ASTSGOT  75*  89*  71*   ALTSGPT  60*  60*  57*   ALKPHOSPHAT  38  37  46   TBILIRUBIN  12.6*  15.1*  10.7*     Current Facility-Administered Medications   Medication Dose Frequency Provider Last Rate Last Dose   • ipratropium-albuterol (DUONEB) nebulizer solution 3 mL  3 mL 4X/DAY (RT) Kalie Ziegler M.D.   3 mL at 18 0634   • vancomycin (VANCOCIN) 1,500 mg in  mL IVPB  1,500 mg Q8HR Bejnie Pina M.D.       • furosemide (LASIX) injection 40 mg  40 mg BID DIURETIC Benjie Pina M.D.   40 mg at 18 0506   • potassium chloride SA (Kdur) tablet 40 mEq   40 mEq DAILY Benjie Pina M.D.   40 mEq at 01/11/18 1100   • enoxaparin (LOVENOX) inj 40 mg  40 mg DAILY Benjie Pina M.D.       • ondansetron (ZOFRAN) syringe/vial injection 4 mg  4 mg Q4HRS PRN Nataly Meneses D.O.   4 mg at 01/11/18 2123   • albuterol (PROVENTIL) 2.5mg/0.5ml nebulizer solution 2.5 mg  2.5 mg Q4H PRN (RT) Benjie Pina M.D.   2.5 mg at 01/10/18 0731   • MD ALERT... warfarin (COUMADIN) per pharmacy protocol   pharmacy to dose Benjie Pina M.D.       • Pharmacy Consult Request ...Pain Management Review   PRN Benjie Pina M.D.        And   • oxycodone immediate-release (ROXICODONE) tablet 2.5 mg  2.5 mg Q3HRS PRN Benjie Pina M.D.        And   • oxycodone immediate-release (ROXICODONE) tablet 5 mg  5 mg Q3HRS PRN Benjie Pina M.D.   5 mg at 01/10/18 0734    And   • HYDROmorphone (DILAUDID) injection 0.5 mg  0.5 mg Q3HRS PRN Benjie Pina M.D.   0.5 mg at 01/12/18 0644   • MD ALERT... vancomycin per pharmacy protocol   pharmacy to dose Benjie Pina M.D.       • Respiratory Care per Protocol   Continuous RT Rodney Mcallister M.D.       • oseltamivir (TAMIFLU) capsule 75 mg  75 mg Q12HRS Ilan Dos Santos M.D.   75 mg at 01/11/18 2019   • senna-docusate (PERICOLACE or SENOKOT S) 8.6-50 MG per tablet 2 Tab  2 Tab BID Kalie Ziegler M.D.   2 Tab at 01/11/18 0843    And   • polyethylene glycol/lytes (MIRALAX) PACKET 1 Packet  1 Packet QDAY PRN Kalie Ziegler M.D.   1 Packet at 01/09/18 0517    And   • magnesium hydroxide (MILK OF MAGNESIA) suspension 30 mL  30 mL QDAY PRN Kalie Ziegler M.D.        And   • bisacodyl (DULCOLAX) suppository 10 mg  10 mg QDAY PRN Kalie Ziegler M.D.       • labetalol (NORMODYNE,TRANDATE) injection 10 mg  10 mg Q4HRS PRN Kalie Ziegler M.D.       • zolpidem (AMBIEN) tablet 5 mg  5 mg HS PRN - MR X 1 Kalie Ziegler M.D.       • acetaminophen (TYLENOL) tablet 650 mg  650 mg Q6HRS PRN  Kalie Ziegler M.D.   650 mg at 01/11/18 2020   • NS (BOLUS) infusion 500 mL  500 mL Once PRN Kalie Ziegler M.D.       • piperacillin-tazobactam (ZOSYN) 3.375 g in  mL IVPB  3.375 g Q8HRS Kalie Ziegler M.D. 25 mL/hr at 01/12/18 0505 3.375 g at 01/12/18 0505     Last reviewed on 1/7/2018 10:45 AM by Christopher Feliz    Quality  Measures:  EKG reviewed, Radiology images reviewed, Labs reviewed and Medications reviewed                      Problems:  Influenza B infection  Acute cholecystitis and cholelithiasis status post laparoscopic cholecystectomy  Respiratory failure  Pneumonia  Central sleep apnea syndrome but adequately treated with auto titrating CPAP 10-15 cm water   Anemia  Electrolyte disturbance  Hyperglycemia  Abnormal liver function  Protein calorie malnutrition  History of prior CVA  History of permanent pacemaker placemen,t history of mitral valve repair,  history of aortic valve replacement        Plan:  The patient's pulmonary infiltrates are likely a consequence of the influenza B infection, possible capillary leak secondary to sepsis, and possibly bacterial superinfection. The present time the patient continues to improve on a regimen of vancomycin, Zosyn, and Tamiflu. He is currently down to 3 L a minute.    Recommend continuing his current anti-infective regimen, O2, RT protocols, and daily at bedtime auto titrating CPAP 10-15 cm water. We will be pleased to follow the patient with you. Recommend correction of metabolic abnormalities, follow chemistries images microbiology and hemogram, continue prophylaxis, continue home medications were appropriate.    No indication for fiberoptic bronchoscopy at the moment. Will follow with you.    Continue his auto-titrating cpap for primarily central sleep apnea, f/u [facility] after discharge for clinical and data card review.

## 2018-01-12 NOTE — PROGRESS NOTES
Report received from Fouzia MARMOLEJO. Pt resting comfortably in chair and has no needs at this time. Call light within reach, pt has nasal canula in place and vitals are stable. Will continue to monitor.

## 2018-01-12 NOTE — FLOWSHEET NOTE
01/12/18 0345   Events/Summary/Plan   Events/Summary/Plan on CPAP   General Vent Information   Pulse Oximetry 91 %   Heart Rate (Monitored) 69   CPAP/BiPAP TRIP Group   Nocturnal CPAP or BiPAP CPAP   Home Unit Used? Yes   Settings (If Known) 10-15   FiO2 or LPM 5   Home Mask Used? Yes

## 2018-01-12 NOTE — PROGRESS NOTES
Inpatient Anticoagulation Service Note    Date: 1/12/2018  Reason for Anticoagulation: Aortic Mechanical Valve Replacement, Afib       Hemoglobin Value: (!) 9.9  Hematocrit Value: (!) 28.3  Lab Platelet Value: 190  Target INR: 2.5 to 3.5    INR from last 7 days     Date/Time INR Value    01/12/18 0300 (!)  1.98    01/11/18 0414 (!)  1.46    01/10/18 1047 (!)  1.31    01/09/18 0420 (!)  1.4    01/08/18 0450 (!)  1.67    01/07/18 1015 (!)  3.02        Dose from last 7 days     Date/Time Dose (mg)    01/12/18 0700  5    01/11/18 0414  7.5    01/10/18 1047  7.5          Comments:  (Patient takes warfarin 5 mg daily with an occasional 7.5 mg )    Plan:  Warfarin 5 mg PO today.  Will continue to monitor labs and clinical course  Education Material Provided?: No (Long-term anticoagulation patient)  Pharmacist suggested discharge dosing: warfarin 5 mg PO daily if clinically appropriate     Moreno Boston, PharmD

## 2018-01-12 NOTE — PROGRESS NOTES
"Pharmacy Kinetics 47 y.o. male on vancomycin day # 4 2018    Currently on Vancomycin 2000 mg iv q12hr    Indication for Treatment: PNA    Pertinent history per medical record: Admitted on 2018 for acute cholecystitis, influenza B.    Other antibiotics: Zosyn    Allergies: Lactose     List concerns for renal function (possible concerns include abnormal LFTs, BUN/SCr ratio > 20:1, CHF, obesity, malnutrition/low albumin, hypermetabolic state (SIRS), pressors/hypotension, nephrotoxic drugs, etc.): obsesity    Pertinent cultures to date:    BCx NGTD    Recent Labs      01/10/18   0125  18   0414  18   0300   WBC  9.8  8.7  10.7   NEUTSPOLYS  81.60*  76.90*  73.00*     Recent Labs      01/10/18   0125  18   0414  18   0300   BUN  16  15  14   CREATININE  0.85  0.92  0.83   ALBUMIN  2.6*  2.4*  2.5*     Recent Labs      01/10/18   1047  18   0034   VANCOTROUGH  7.3*  10.8     Intake/Output Summary (Last 24 hours) at 18 0739  Last data filed at 18 0600   Gross per 24 hour   Intake             2050 ml   Output             2800 ml   Net             -750 ml      Blood pressure 136/87, pulse 67, temperature 37.2 °C (98.9 °F), resp. rate 19, height 1.727 m (5' 8\"), weight 113.6 kg (250 lb 7.1 oz), SpO2 94 %. Temp (24hrs), Av.6 °C (99.7 °F), Min:37.1 °C (98.8 °F), Max:38.1 °C (100.6 °F)      A/P   1. Vancomycin dose change: Vancomycin 1500 mg IV q8h (,,)  2. Next vancomycin level:  @ 0830  3. Goal trough: 16-20 mcg/ml  4. Comments: Will continue to monitor labs and clinical course    Moreno Boston, PharmD    "

## 2018-01-12 NOTE — PROGRESS NOTES
Cardiology Progress Note               Author: Keyona Moses Date & Time created: 2018  2:43 PM     Interval History:  Since initiating Lasix dyspnea has significantly improved.    Chief Complaint:  Complaining of tachycardia with minimal activities.    Review of Systems   Constitutional: Negative for fever.   HENT: Negative for hearing loss.    Eyes: Negative for blurred vision.   Respiratory: Positive for shortness of breath.    Cardiovascular: Positive for leg swelling. Negative for chest pain.   Gastrointestinal: Negative for abdominal pain.   Genitourinary: Negative for hematuria.   Musculoskeletal: Positive for joint pain.   Skin: Negative for rash.   Neurological: Negative for dizziness.   Psychiatric/Behavioral: Negative for depression.       Physical Exam   Constitutional: He is oriented to person, place, and time. He appears well-developed and well-nourished. No distress.   HENT:   Head: Normocephalic and atraumatic.   Eyes: Conjunctivae are normal. Pupils are equal, round, and reactive to light. Right eye exhibits no discharge. Left eye exhibits no discharge.   Neck: JVD present. No tracheal deviation present.   Cardiovascular: Normal rate and regular rhythm.    Murmur heard.   Systolic murmur is present with a grade of 3/6   Pulses:       Carotid pulses are 2+ on the right side, and 2+ on the left side.       Radial pulses are 2+ on the right side, and 2+ on the left side.   Metallic second heart sound with ejection systolic murmur mid peaking   Pulmonary/Chest: Effort normal. He has rales.   Abdominal: Soft.   Musculoskeletal: Normal range of motion. He exhibits edema.   Neurological: He is alert and oriented to person, place, and time. No cranial nerve deficit.   Skin: Skin is warm and dry. He is not diaphoretic. No erythema.   Psychiatric: He has a normal mood and affect.       Hemodynamics:  Temp (24hrs), Av.3 °C (99.2 °F), Min:37.1 °C (98.7 °F), Max:38.1 °C (100.5 °F)  Temperature: 37.1 °C  (98.7 °F)  Pulse  Av  Min: 58  Max: 104Heart Rate (Monitored): 77  NIBP: 138/76     Respiratory:    Respiration: 20, Pulse Oximetry: 96 %, O2 Daily Delivery Respiratory : Silicone Nasal Cannula     Given By:: Mouthpiece, PEP/CPT Method: Positive Airway Pressure Device, Work Of Breathing / Effort: Mild;Shallow  RUL Breath Sounds: Clear;Diminished, RML Breath Sounds: Clear;Diminished, RLL Breath Sounds: Clear;Diminished;Fine Crackles, DELORES Breath Sounds: Clear;Diminished, LLL Breath Sounds: Clear;Diminished  Fluids:  Date 18 0700 - 18 0659   Shift 2692-6935 0079-7923 1290-5307 24 Hour Total   I  N  T  A  K  E   P.O. 560   560    I.V. 325   325    Shift Total 885   885   O  U  T  P  U  T   Urine 850   850    Shift Total 850   850   Weight (kg) 113.6 113.6 113.6 113.6          GI/Nutrition:  Orders Placed This Encounter   Procedures   • DIET ORDER     Standing Status:   Standing     Number of Occurrences:   1     Order Specific Question:   Diet:     Answer:   Cardiac [6]     Order Specific Question:   Diet:     Answer:   2 Gram Sodium [7]     Lab Results:  Recent Labs      01/10/18   0125   18   0414   18   1631  18   0300  18   1002   WBC  9.8   --   8.7   --    --   10.7   --    RBC  3.49*   --   3.27*   --    --   3.25*   --    HEMOGLOBIN  10.6*   < >  9.8*   < >  11.4*  9.9*  10.7*   HEMATOCRIT  30.4*   < >  28.6*   < >  32.3*  28.3*  30.3*   MCV  87.1   --   87.5   --    --   87.1   --    MCH  30.4   --   30.0   --    --   30.5   --    MCHC  34.9   --   34.3   --    --   35.0   --    RDW  41.7   --   42.7   --    --   44.1   --    PLATELETCT  139*   --   148*   --    --   190   --    MPV  10.1   --   10.3   --    --   9.9   --     < > = values in this interval not displayed.     Recent Labs      01/10/18   0125  18   0414  18   0300   SODIUM  134*  133*  135   POTASSIUM  4.0  3.5*  3.4*   CHLORIDE  102  98  98   CO2  26  27  28   GLUCOSE  132*  120*  110*   BUN   16  15  14   CREATININE  0.85  0.92  0.83   CALCIUM  7.6*  7.5*  7.7*     Recent Labs      01/10/18   1047  01/11/18   0414  01/12/18   0300   INR  1.31*  1.46*  1.98*                     Chest Xray: 1/11/18  Left-sided pacemaker in place.  Bilateral airspace opacities, left more than right.  No pleural effusion. No pneumothorax.  Stable enlarged cardiopericardial silhouette    TTE: 1/8/18  Normal left ventricular systolic function.  Left ventricular ejection fraction is visually estimated to be 65%.  Tissue Doppler imaging suggests abnormal diastolic function.  Normal right ventricular systolic function.  Known mitral valve repair with mean transvalvular gradient of 9 mmHg at a heart rate of 69 BPM.  Known mechanical aortic valve. Transvalvular gradients are - Peak: 56 mmHg, Mean: 33 mmHg.  Moderate tricuspid regurgitation.  Right ventricular systolic pressure is estimated to be 65 mmHg.  Compared to the images of the prior study done 9/22/16, the measured transaortic gradients are increased and the estimated right ventricular systolic pressure is increased, previously normal.    TTE: 9/22/16  Left ventricular ejection fraction is visually estimated to be 60%. Normal regional wall motion.  Patent foramen ovale present with a left-to-right shunt.  Mild to moderate mitral stenosis.   Mean transvalvular gradient is 6 mmHg at a heart rate of 60 BPM. Trace mitral regurgitation.  Bioprosthetic aortic valve that is functioning normally. Transvalvular gradients are - Peak: 34 mmHg, Mean: 17 mmHg.  Mild tricuspid regurgitation. Estimated right ventricular systolic pressure  is 35 mmHg.      Medical Decision Making, by Problem:  Active Hospital Problems    Diagnosis   • *Acute cholecystitis [K81.0]   • SOB (shortness of breath) [R06.02]   • Chronic anticoagulation [Z79.01]   • TRIP on CPAP [G47.33, Z99.89]   • Hypertension [I10]   • Chronic diastolic heart failure (CMS-HCC) [I50.32]   • Jaundice [R17]   • Influenza B [J10.1]        Plan:  Patient clinically volume overloaded responding with IV Lasix.   Continue Lasix 40 mg IV twice a day.  Closely monitor ins and outs.    Mechanical aortic valve with elevated gradients  If no contraindication consider bridging with either Lovenox or heparin till INR is more than 2.  Continue Coumadin with INR of 2-3.  Once INR is therapeutic repeat echocardiogram in couple of weeks to reassess gradients across the aortic valve.    Influenza B infection with possible bacterial superinfection  Continue Tamiflu and broad-spectrum antibiotics.     Thank you for allowing me to participate in taking care of patient.    Keyona Kimball MD.    Quality-Core Measures

## 2018-01-12 NOTE — PROGRESS NOTES
0700-Report from JALEEL Chi. POC reviewed. Pt sitting up in cardiac chair at this time. No current needs.     0835-Pt in agreement to taking lovenox. Remains up in chair. Call light within reach.

## 2018-01-12 NOTE — FLOWSHEET NOTE
01/12/18 0636   Events/Summary/Plan   Events/Summary/Plan Pt is on 3L NC, tx given, IS and PEP done   Non-Invasive Resp Device Site Inspection Completed Intact   Interdisciplinary Plan of Care-Goals (Indications)   Obstructive Ventilatory Defect or Pulmonary Disease without Obvious Obstruction Physical Exam / Hyperinflation / Wheezing (bronchospasm)   Hyperinflation Protocol Indications Pre or Post-op Abdominal, Thoracic or Orthopedic Surgery   Interdisciplinary Plan of Care-Outcomes    Bronchodilator Outcome Diminished Wheezing and Volume of Air Movement Increased   Hyperinflation Protocol Goals/Outcome Improvement in Repeat CXR;Increased Vital Capacity or Return to Pre-operative Values   Education   Education Yes - Pt. / Family has been Instructed in use of Respiratory Medications and Adverse Reactions;Yes - Pt. / Family has been Instructed in use of Respiratory Equipment   RT Assessment of Delivered Medications   Evaluation of Medication Delivery Daily Yes-- Pt /Family has been Instructed in use of Respiratory Medications and Adverse Reactions   SVN Group   #SVN Performed Yes   Given By: Mouthpiece   PEP/CPT Group   #PEP/CPT (Manual) Subsequent Subsequent   PEP/CPT Method Positive Airway Pressure Device   Incentive Spirometry Group   Incentive Spirometry Instruction Yes   Breathing Exercises Yes   Incentive Spirometer Volume 1750 mL   Respiratory WDL   Respiratory (WDL) X   Chest Exam   Work Of Breathing / Effort Mild   Respiration 19   Pulse 67   Heart Rate (Monitored) 69   Breath Sounds   Pre/Post Intervention Post Intervention Assessment   RUL Breath Sounds Diminished   RML Breath Sounds Diminished   RLL Breath Sounds Diminished   DELORES Breath Sounds Diminished   LLL Breath Sounds Diminished   Secretions   Cough Non Productive   How Sputum Obtained Spontaneous   Oximetry   Continuous Oximetry Yes   Oxygen   Home O2 Use Prior To Admission? No   Pulse Oximetry 92 %   O2 (LPM) 3   O2 Daily Delivery Respiratory   Silicone Nasal Cannula

## 2018-01-12 NOTE — PROGRESS NOTES
[facility] Hospitalist Progress Note    Date of Service: 2018    Chief Complaint  47 y.o. male admitted 2018 with CVA, CAD, HTN, apfib, pacemaker, TRIP who presented with acute cholecystitis and influenza B.     Interval Problem Update  Continues to have RUQ pain, on Zosyn. Cholecystectomy with Dr. Mcallister tomorrow.  CXR with edema. Hypoxia on 3-4L O2. On Tamiflu  TTE pending  INR 1.67 after vit K. Heparin bridge for mech valve until surgery.   pt on 4Lof o2, received 40mg lasix with good response, repeated cxr showed b/l infiltrate probably infectious, added vancomycin due to risk for MRSA infection due to + influenza, discussed with Dr Mcallister surgery regarding new finding he stated that patient is very symptomatic from his infected gallbladder and will benefit from surgery, patient will need very close monitoring during and after surgery.   Condition guarded.   1/10 s/p lap madhavi on , still feeling sob but is better, c/o abdominal pain surgical site. Had fever today, will repeat cxr in am.    feeling better, still on 4L of o2, pulmonologist and cardio consulted today.     Consultants/Specialty  General surgery    Disposition  TBD.         Review of Systems   Constitutional: Negative for chills and fever.   HENT: Negative for tinnitus.    Eyes: Negative for blurred vision and double vision.   Respiratory: Positive for cough and shortness of breath (improved today. ).    Cardiovascular: Negative for chest pain and palpitations.   Gastrointestinal: Positive for abdominal pain (surgical site. better today. ). Negative for diarrhea, nausea and vomiting.   Genitourinary: Negative for dysuria.   Skin: Negative for itching.   Neurological: Negative for dizziness, focal weakness and headaches.      Physical Exam  Laboratory/Imaging   Hemodynamics  Temp (24hrs), Av.7 °C (99.8 °F), Min:37.1 °C (98.7 °F), Max:38.4 °C (101.1 °F)   Temperature: 37.6 °C (99.6 °F)  Pulse  Av.3  Min: 58  Max: 104 Heart Rate  (Monitored): 78  NIBP: 152/75      Respiratory      Respiration: (!) 22, Pulse Oximetry: 94 %, O2 Daily Delivery Respiratory : Silicone Nasal Cannula     Given By:: Mouthpiece, PEP/CPT Method: Positive Airway Pressure Device, Work Of Breathing / Effort: Mild  RUL Breath Sounds: Clear;Expiratory Wheezes, RML Breath Sounds: Diminished, RLL Breath Sounds: Diminished, DELORES Breath Sounds: Clear;Expiratory Wheezes, LLL Breath Sounds: Diminished    Fluids    Intake/Output Summary (Last 24 hours) at 01/11/18 1748  Last data filed at 01/11/18 1700   Gross per 24 hour   Intake             2200 ml   Output             2700 ml   Net             -500 ml       Nutrition  Orders Placed This Encounter   Procedures   • DIET ORDER     Standing Status:   Standing     Number of Occurrences:   1     Order Specific Question:   Diet:     Answer:   Cardiac [6]     Order Specific Question:   Diet:     Answer:   2 Gram Sodium [7]     Physical Exam   Constitutional: He is oriented to person, place, and time. He is cooperative. No distress.   Neck: JVD present.   Cardiovascular: Normal rate and regular rhythm.    Systolic click   Pulmonary/Chest: Effort normal. He has wheezes (improved. ). He has rales.   Decreased breath sound at bases   Abdominal: Soft. Bowel sounds are normal. There is tenderness (RUQ tenderness). There is no rebound and no guarding.   Musculoskeletal: He exhibits no edema.   Neurological: He is alert and oriented to person, place, and time.   Skin: No erythema.   Nursing note and vitals reviewed.      Recent Labs      01/09/18   0420  01/10/18   0125   01/11/18   0414  01/11/18   1124  01/11/18   1631   WBC  7.6  9.8   --   8.7   --    --    RBC  3.90*  3.49*   --   3.27*   --    --    HEMOGLOBIN  12.0*  10.6*   < >  9.8*  10.2*  11.4*   HEMATOCRIT  34.2*  30.4*   < >  28.6*  29.6*  32.3*   MCV  87.7  87.1   --   87.5   --    --    MCH  30.8  30.4   --   30.0   --    --    MCHC  35.1  34.9   --   34.3   --    --    RDW   42.5  41.7   --   42.7   --    --    PLATELETCT  117*  139*   --   148*   --    --    MPV  9.7  10.1   --   10.3   --    --     < > = values in this interval not displayed.     Recent Labs      01/09/18   0420  01/10/18   0125  01/11/18   0414   SODIUM  134*  134*  133*   POTASSIUM  4.2  4.0  3.5*   CHLORIDE  101  102  98   CO2  24  26  27   GLUCOSE  116*  132*  120*   BUN  19  16  15   CREATININE  0.82  0.85  0.92   CALCIUM  8.3*  7.6*  7.5*     Recent Labs      01/08/18   2127  01/09/18   0420  01/09/18   1106  01/10/18   1047  01/11/18   0414   APTT  120.1*  96.6*  64.6*   --    --    INR   --   1.40*   --   1.31*  1.46*     Recent Labs      01/09/18 0420   BNPBTYPENAT  108*              Assessment/Plan     * Acute cholecystitis- (present on admission)   Assessment & Plan    Rising LFTs. US gallbladder showed cholelithiasis with wall thickening. No signs of sepsis.  - continue zosyn  - cholecystectomy with Dr. Mcallister tomorrow 6pm  - clear diet, NPO after breakfast tomorrow  - pain control  - blood cultures neg  For surgery today.  S/p lap madhavi on 1/9, did well. Pain better controlled today.   Pain is well controlled. Had erythema below umbilical surgical site no tender will continue monitoring.         SOB (shortness of breath)- (present on admission)   Assessment & Plan    Mild interstitial markings on CXR with symptoms of PND. Only trace LE edema and no JVD present. Last echo was done in Sept 2016 showed EF 60% and moderate mitral stenosis. Influenza B is positive.  - limit IVF as possible  - repeat echo  - tamiflu  cxr on 1/9 showing worsening infiltrates, possible pneumonia vs ARDS, on vanco, zosyn. o2 per protocol.   1/10 procalcitonin is elevated, continue vanc and zosyn for now, repeat cxr in am.   Due to complicated bacterial pneumonia on patient with influenza.        Chronic anticoagulation- (present on admission)   Assessment & Plan    pAFib and h/o mechanical aortic valve replacement. INR goal  2.5-3.5. But plans for surgery tomorrow night. INR 1.6 after vit K.   - given risks of thrombosis with mech valve and pAfib, will bridge on heparin until surgery tomorrow  Will restart heparin and warfarin when ok with surgery.   1/10 as per surgery ok to start warfarin today but wants to hold on full A/C until hb stable. Will keep monitoring if INR not therapeutic tomorrow and hb stable will probably restart heparin drip if ok with surgery.   Will start low dose lovenox today for d/c prophylaxis as per surgery recommendation.         Jaundice   Assessment & Plan    Bilirubin increased today, checking fractionated bili, hida scan today is wnl. Probably due to hepatic congestion, will f/u liver function test,         Chronic diastolic heart failure (CMS-HCC)- (present on admission)   Assessment & Plan    Due to mildly elevated bnp and b/l infiltrates lasix was increased today.   Cardio consulted, continue monitoring.         Influenza B- (present on admission)   Assessment & Plan    Positive swab. Symptoms have been improving, per patient.   - given hypoxia, started on tamiflu  Now probably complicated with bacterial pneumonia, added vancomycin since there is risk for MRSA infection. Continue zosyn.   Stable .  cxr today showing stable b/l infiltrate L>R pulmonologist consulted continue current atb.         TRIP on CPAP- (present on admission)   Assessment & Plan    Home CPAP at bedside.        Hypertension- (present on admission)   Assessment & Plan    Normotensive. Currently not on any home medications.   continue to monitor              Reviewed items::  Labs reviewed, Medications reviewed and Radiology images reviewed  Bhatia catheter::  No Bhatia  DVT prophylaxis pharmacological::  Warfarin (Coumadin) and Enoxaparin (Lovenox)  Antibiotics:  Treating active infection/contamination beyond 24 hours perioperative coverage      >35 minutes spent on this this encounter by myself today, greater than 50% on counseling and  coordination of care.

## 2018-01-12 NOTE — ASSESSMENT & PLAN NOTE
Due to mildly elevated bnp and b/l infiltrates lasix was increased today.   Cardio consulted, continue monitoring.   Better continue lasix.   Stable neg balance 515cc.

## 2018-01-12 NOTE — FLOWSHEET NOTE
01/12/18 1028   Events/Summary/Plan   Events/Summary/Plan Tx given   Non-Invasive Resp Device Site Inspection Completed Intact   Interdisciplinary Plan of Care-Goals (Indications)   Obstructive Ventilatory Defect or Pulmonary Disease without Obvious Obstruction Physical Exam / Hyperinflation / Wheezing (bronchospasm)   Hyperinflation Protocol Indications Pre or Post-op Abdominal, Thoracic or Orthopedic Surgery   Interdisciplinary Plan of Care-Outcomes    Bronchodilator Outcome Diminished Wheezing and Volume of Air Movement Increased   Hyperinflation Protocol Goals/Outcome Improvement in Repeat CXR;Increased Vital Capacity or Return to Pre-operative Values   Education   Education Yes - Pt. / Family has been Instructed in use of Respiratory Medications and Adverse Reactions   RT Assessment of Delivered Medications   Evaluation of Medication Delivery Daily Yes-- Pt /Family has been Instructed in use of Respiratory Medications and Adverse Reactions   SVN Group   #SVN Performed Yes   Given By: Mouthpiece   PEP/CPT Group   #PEP/CPT (Manual) Subsequent Subsequent   PEP/CPT Method Positive Airway Pressure Device   Incentive Spirometry Group   Incentive Spirometry Instruction Yes   Breathing Exercises Yes   Incentive Spirometer Volume 1900 mL   Respiratory WDL   Respiratory (WDL) X   Chest Exam   Work Of Breathing / Effort Mild   Respiration 18   Pulse 68   Heart Rate (Monitored) (!) 109   Breath Sounds   Pre/Post Intervention Post Intervention Assessment   RUL Breath Sounds Diminished   RML Breath Sounds Diminished   RLL Breath Sounds Diminished;Fine Crackles   DELORES Breath Sounds Diminished   LLL Breath Sounds Diminished   Secretions   Cough Non Productive;Strong   How Sputum Obtained Spontaneous   Oximetry   Continuous Oximetry Yes   Oxygen   Pulse Oximetry 95 %   O2 (LPM) 3   O2 Daily Delivery Respiratory  Silicone Nasal Cannula

## 2018-01-12 NOTE — FLOWSHEET NOTE
01/11/18 1901   Events/Summary/Plan   Events/Summary/Plan SVN PEP IS   Interdisciplinary Plan of Care-Goals (Indications)   Obstructive Ventilatory Defect or Pulmonary Disease without Obvious Obstruction Physical Exam / Hyperinflation / Wheezing (bronchospasm)   Hyperinflation Protocol Indications Pre or Post-op Abdominal, Thoracic or Orthopedic Surgery   Interdisciplinary Plan of Care-Outcomes    Bronchodilator Outcome Diminished Wheezing and Volume of Air Movement Increased   Hyperinflation Protocol Goals/Outcome Improvement in Repeat CXR   Education   Education Yes - Pt. / Family has been Instructed in use of Respiratory Equipment;Yes - Pt. / Family has been Instructed in use of Respiratory Medications and Adverse Reactions   RT Assessment of Delivered Medications   Evaluation of Medication Delivery Daily Yes-- Pt /Family has been Instructed in use of Respiratory Medications and Adverse Reactions   SVN Group   #SVN Performed Yes   Given By: Mouthpiece   PEP/CPT Group   #PEP/CPT (Manual) Subsequent Subsequent   PEP/CPT Method Positive Airway Pressure Device   Incentive Spirometry Group   Incentive Spirometry Instruction Yes   Breathing Exercises Yes   Incentive Spirometer Volume 1750 mL   Respiratory WDL   Respiratory (WDL) X   Chest Exam   Respiration 17   Pulse 61   Heart Rate (Monitored) 72   Breath Sounds   Pre/Post Intervention Post Intervention Assessment   RUL Breath Sounds Clear   RML Breath Sounds Diminished   RLL Breath Sounds Diminished   DELORES Breath Sounds Clear   LLL Breath Sounds Diminished   Oximetry   Continuous Oximetry Yes   Oxygen   Pulse Oximetry 98 %   O2 (LPM) 3   O2 Daily Delivery Respiratory  Silicone Nasal Cannula

## 2018-01-12 NOTE — PROGRESS NOTES
Pt refusing Lovenox, all risks reviewed and explained to Pt. Pt VU. Dr Conchita Katz notified in person.

## 2018-01-12 NOTE — PROGRESS NOTES
Pt ambulated in [place] with sba of RN. Tolerated well. Remained on 3L O2 NC with ambulation.

## 2018-01-12 NOTE — FLOWSHEET NOTE
01/12/18 1039   Events/Summary/Plan   Events/Summary/Plan Decreased O2 to 2L,    Chest Exam   Work Of Breathing / Effort Mild;Shallow   Respiration 16   Pulse 74   Heart Rate (Monitored) 71   Oxygen   Pulse Oximetry 94 %   O2 (LPM) 2   O2 Daily Delivery Respiratory  Silicone Nasal Cannula

## 2018-01-12 NOTE — CARE PLAN
Problem: Communication  Goal: The ability to communicate needs accurately and effectively will improve  Outcome: PROGRESSING AS EXPECTED  Communication will remain effective with patient. POC over viewed with pt. Pt to notify RN if/when needs/concerns/questions arise.     Problem: Safety  Goal: Will remain free from injury  Outcome: PROGRESSING AS EXPECTED  Pt will remain safe and free from injury. Pt independent when voiding but calls for help with assistance from bed to chair or vice versa.

## 2018-01-12 NOTE — ASSESSMENT & PLAN NOTE
Bilirubin increased today, checking fractionated bili, hida scan today is wnl. Probably due to hepatic congestion, will f/u liver function test,   Improving

## 2018-01-12 NOTE — CONSULTS
DATE OF SERVICE:  01/11/2018    HISTORY OF PRESENT ILLNESS:  This 47-year-old man is seen at the request of    _____ for increasing shortness of breath and abnormal chest x-ray.  This   patient was admitted on January 7th with chronic dyspnea and abdominal   discomfort and evidence of acute cholecystitis.  Chest x-ray on admission was   mildly abnormal.  He underwent a laparoscopic cholecystectomy on January 9th   without major problems, but there was evidence of increasing infiltrates   preoperatively.  The patient has a history of replacement of ascending aortic   aneurysm, aortic valve replacement with mechanical valve and mitral valve   repair about 10 years ago with a maze procedure.  This was complicated by   complete AV block and he has got a permanent pacemaker.  He is on chronic   Coumadin.  He has chronic sleep apnea.  He is compliant with CPAP.  His most   recent echo done on January 8th demonstrates a 33-mm gradient across the   mechanical valve with a peak gradient of 56, a 9-mm gradient across the mitral   valve repair with trace mitral regurgitation, the pulmonary artery pressure   of 65 mmHg and normal left ventricular systolic function with diastolic   dysfunction.  This compares unfavorably with previous echo done in January 2016, which demonstrated a 6-mm gradient across the mitral valve, a 17-mm mean   gradient across the aortic valve and a PA pressure of 35 mmHg.  He is   chronically on Coumadin.  He says he has never felt well since the surgery   many years ago and has chronic shortness of breath, although he does function   as a .    ALLERGIES:  TO LACTOSE FOR WHICH HE GETS DIARRHEA AND UPSET STOMACH.     He also has a history of hypertension and reportedly PAF.  We did check his   pacemaker today.  He has had no evidence of atrial fibrillation for the   previous 6 months.  Some brief wide QRS complex tachycardia, nonsustained in   June 2017.    FAMILY HISTORY:  Negative for  premature CAD.    PAST SURGICAL HISTORY:  Heart surgery as described above about 10 years ago,   permanent pacemaker, recent cholecystectomy done 2 days ago.  He has had a   HIDA scan today because of rising bilirubin that shows no evidence of   bilirubin leak.    REVIEW OF SYSTEMS:  No history of strokes, dizziness, TIAs, or seizures.  He   does have well-maintained protimes.  Appetite is good.  No weight loss, weight   gain, abdominal pain as above.  Recovering from cholecystectomy, intermittent   lower extremity edema.  No dysuria.  All other systems reviewed and are   negative.    PHYSICAL EXAMINATION:  GENERAL:  Reveals an obese, mildly tachypneic man, alert, able to give a good   history.  Heart rate currently is regular.  VITAL SIGNS:  Blood pressure normal.  Most recently 143/66, heart rate 75,   currently regular.  SKIN:  Warm and dry.  HEENT:  Significant scleral icterus is noted.  NECK:  Veins appeared to be distended.  He has a short neck and using light, I   think his neck veins are 3 cm above the suprasternal notch in the upright   position.  LUNGS:  Actually clear to auscultation except for rales on the left lower   lobe.  HEART:  Reveals mechanical sounds with no murmur.  No rub.  ABDOMEN:  Postsurgical.  EXTREMITIES:  No obvious edema today.  Good pulses.  No clubbing or cyanosis.    Chest x-ray demonstrates increasing asymmetric infiltrates, particularly on   the left side.  It is interesting beta natriuretic peptide was 108 two days   ago and normal on January 7th.    IMPRESSION:  1.  Increasing respiratory distress, increasing infiltrates. This may well be   viral pneumonia, and there may well be a component of left heart failure as   well.  If he has left heart failure, it is a combination of diastolic   dysfunction of left ventricle, some degree of mild-to-moderate mitral   stenosis.  The increasing systolic gradient across the mechanical aortic valve   was of uncertain significance at this  time.  2.  Permanent pacemaker, functioning appropriately.  No evidence of atrial   fibrillation on today's inquiry.  3.  Status post cholecystectomy.  4.  Postoperative and preoperative hyperbilirubinemia.  5.  Treated sleep apnea.  6.  Pulmonary hypertension, increasing.    PLAN:  I agree with increasing Lasix as it was done today to 40 mg IV twice a   day and to continue diuresis in spite of normal patient's beta natriuretic   peptide.  I agree with pulmonary consult.  We do not need to worry at this   moment about atrial fibrillation, and he does not need to be bridged with   Lovenox for the mechanical aortic valve.  We will follow closely with you.    Thank you for this consultation.       ____________________________________     MD NELLI Hurd / TACHO    DD:  01/11/2018 16:09:11  DT:  01/11/2018 18:42:20    D#:  [id number]  Job#:  [id number]

## 2018-01-12 NOTE — FLOWSHEET NOTE
01/12/18 1416   Events/Summary/Plan   Events/Summary/Plan Tx given   Non-Invasive Resp Device Site Inspection Completed Intact   Interdisciplinary Plan of Care-Goals (Indications)   Obstructive Ventilatory Defect or Pulmonary Disease without Obvious Obstruction Physical Exam / Hyperinflation / Wheezing (bronchospasm)   Hyperinflation Protocol Indications Pre or Post-op Abdominal, Thoracic or Orthopedic Surgery   Interdisciplinary Plan of Care-Outcomes    Bronchodilator Outcome Diminished Wheezing and Volume of Air Movement Increased   Hyperinflation Protocol Goals/Outcome Improvement in Repeat CXR;Increased Vital Capacity or Return to Pre-operative Values   Education   Education Yes - Pt. / Family has been Instructed in use of Respiratory Medications and Adverse Reactions;Yes - Pt. / Family has been Instructed in use of Respiratory Equipment   RT Assessment of Delivered Medications   Evaluation of Medication Delivery Daily Yes-- Pt /Family has been Instructed in use of Respiratory Medications and Adverse Reactions   SVN Group   #SVN Performed Yes   Given By: Mouthpiece   PEP/CPT Group   #PEP/CPT (Manual) Subsequent Subsequent   PEP/CPT Method Positive Airway Pressure Device   Incentive Spirometry Group   Incentive Spirometry Instruction Yes   Breathing Exercises Yes   Incentive Spirometer Volume 1800 mL   Respiratory WDL   Respiratory (WDL) X   Chest Exam   Work Of Breathing / Effort Mild;Shallow   Respiration 20   Heart Rate (Monitored) 77   Breath Sounds   Pre/Post Intervention Post Intervention Assessment   RUL Breath Sounds Clear;Diminished   RML Breath Sounds Clear;Diminished   RLL Breath Sounds Clear;Diminished;Fine Crackles   DELORES Breath Sounds Clear;Diminished   LLL Breath Sounds Clear;Diminished   Secretions   Cough Non Productive;Strong   How Sputum Obtained Spontaneous   Oximetry   Continuous Oximetry Yes   Oxygen   Pulse Oximetry 96 %   O2 (LPM) 2   O2 Daily Delivery Respiratory  Silicone Nasal Cannula

## 2018-01-12 NOTE — FLOWSHEET NOTE
01/11/18 1945   Events/Summary/Plan   Events/Summary/Plan placed on CPAP   General Vent Information   Pulse Oximetry 94 %   Heart Rate (Monitored) 73   CPAP/BiPAP TRIP Group   Nocturnal CPAP or BiPAP CPAP   #System Evaluation Yes   Home Unit Used? Yes   Equipment Inspected for Cleanliness, Operation, and Safety? Yes   Settings (If Known) 10-15   FiO2 or LPM y   Home Mask Used? Yes

## 2018-01-13 ENCOUNTER — APPOINTMENT (OUTPATIENT)
Dept: RADIOLOGY | Facility: MEDICAL CENTER | Age: 48
DRG: 987 | End: 2018-01-13
Attending: INTERNAL MEDICINE
Payer: COMMERCIAL

## 2018-01-13 LAB
ALBUMIN SERPL BCP-MCNC: 2.6 G/DL (ref 3.2–4.9)
ALBUMIN/GLOB SERPL: 0.8 G/DL
ALP SERPL-CCNC: 59 U/L (ref 30–99)
ALT SERPL-CCNC: 57 U/L (ref 2–50)
ANION GAP SERPL CALC-SCNC: 8 MMOL/L (ref 0–11.9)
AST SERPL-CCNC: 67 U/L (ref 12–45)
BASOPHILS # BLD AUTO: 0.5 % (ref 0–1.8)
BASOPHILS # BLD: 0.06 K/UL (ref 0–0.12)
BILIRUB SERPL-MCNC: 6.2 MG/DL (ref 0.1–1.5)
BUN SERPL-MCNC: 15 MG/DL (ref 8–22)
CALCIUM SERPL-MCNC: 7.8 MG/DL (ref 8.4–10.2)
CHLORIDE SERPL-SCNC: 99 MMOL/L (ref 96–112)
CO2 SERPL-SCNC: 28 MMOL/L (ref 20–33)
CREAT SERPL-MCNC: 0.94 MG/DL (ref 0.5–1.4)
EOSINOPHIL # BLD AUTO: 0.26 K/UL (ref 0–0.51)
EOSINOPHIL NFR BLD: 2.3 % (ref 0–6.9)
ERYTHROCYTE [DISTWIDTH] IN BLOOD BY AUTOMATED COUNT: 44.8 FL (ref 35.9–50)
GLOBULIN SER CALC-MCNC: 3.1 G/DL (ref 1.9–3.5)
GLUCOSE SERPL-MCNC: 99 MG/DL (ref 65–99)
HCT VFR BLD AUTO: 29.5 % (ref 42–52)
HGB BLD-MCNC: 10 G/DL (ref 14–18)
IMM GRANULOCYTES # BLD AUTO: 0.54 K/UL (ref 0–0.11)
IMM GRANULOCYTES NFR BLD AUTO: 4.8 % (ref 0–0.9)
INR PPP: 2.44 (ref 0.87–1.13)
LYMPHOCYTES # BLD AUTO: 1.27 K/UL (ref 1–4.8)
LYMPHOCYTES NFR BLD: 11.2 % (ref 22–41)
MCH RBC QN AUTO: 29.9 PG (ref 27–33)
MCHC RBC AUTO-ENTMCNC: 33.9 G/DL (ref 33.7–35.3)
MCV RBC AUTO: 88.1 FL (ref 81.4–97.8)
MONOCYTES # BLD AUTO: 1.37 K/UL (ref 0–0.85)
MONOCYTES NFR BLD AUTO: 12.1 % (ref 0–13.4)
NEUTROPHILS # BLD AUTO: 7.8 K/UL (ref 1.82–7.42)
NEUTROPHILS NFR BLD: 69.1 % (ref 44–72)
NRBC # BLD AUTO: 0.02 K/UL
NRBC BLD-RTO: 0.2 /100 WBC
PLATELET # BLD AUTO: 266 K/UL (ref 164–446)
PMV BLD AUTO: 10.3 FL (ref 9–12.9)
POTASSIUM SERPL-SCNC: 3.4 MMOL/L (ref 3.6–5.5)
PROCALCITONIN SERPL-MCNC: 1.46 NG/ML
PROT SERPL-MCNC: 5.7 G/DL (ref 6–8.2)
PROTHROMBIN TIME: 26 SEC (ref 12–14.6)
RBC # BLD AUTO: 3.35 M/UL (ref 4.7–6.1)
SCCMEC + MECA PNL NOSE NAA+PROBE: NEGATIVE
SCCMEC + MECA PNL NOSE NAA+PROBE: POSITIVE
SODIUM SERPL-SCNC: 135 MMOL/L (ref 135–145)
VANCOMYCIN TROUGH SERPL-MCNC: 18.5 UG/ML (ref 10–20)
WBC # BLD AUTO: 11.3 K/UL (ref 4.8–10.8)

## 2018-01-13 PROCEDURE — 80053 COMPREHEN METABOLIC PANEL: CPT

## 2018-01-13 PROCEDURE — A9270 NON-COVERED ITEM OR SERVICE: HCPCS | Performed by: HOSPITALIST

## 2018-01-13 PROCEDURE — 99232 SBSQ HOSP IP/OBS MODERATE 35: CPT | Performed by: HOSPITALIST

## 2018-01-13 PROCEDURE — 700105 HCHG RX REV CODE 258: Performed by: HOSPITALIST

## 2018-01-13 PROCEDURE — 80202 ASSAY OF VANCOMYCIN: CPT

## 2018-01-13 PROCEDURE — 84145 PROCALCITONIN (PCT): CPT

## 2018-01-13 PROCEDURE — 770020 HCHG ROOM/CARE - TELE (206)

## 2018-01-13 PROCEDURE — 700102 HCHG RX REV CODE 250 W/ 637 OVERRIDE(OP): Performed by: HOSPITALIST

## 2018-01-13 PROCEDURE — 700111 HCHG RX REV CODE 636 W/ 250 OVERRIDE (IP): Performed by: HOSPITALIST

## 2018-01-13 PROCEDURE — 94660 CPAP INITIATION&MGMT: CPT

## 2018-01-13 PROCEDURE — 71045 X-RAY EXAM CHEST 1 VIEW: CPT

## 2018-01-13 PROCEDURE — 94668 MNPJ CHEST WALL SBSQ: CPT

## 2018-01-13 PROCEDURE — 94760 N-INVAS EAR/PLS OXIMETRY 1: CPT

## 2018-01-13 PROCEDURE — 36415 COLL VENOUS BLD VENIPUNCTURE: CPT

## 2018-01-13 PROCEDURE — 85025 COMPLETE CBC W/AUTO DIFF WBC: CPT

## 2018-01-13 PROCEDURE — 85610 PROTHROMBIN TIME: CPT

## 2018-01-13 RX ORDER — DOXYCYCLINE 100 MG/1
100 TABLET ORAL EVERY 12 HOURS
Status: DISCONTINUED | OUTPATIENT
Start: 2018-01-13 | End: 2018-01-13

## 2018-01-13 RX ORDER — CALCIUM CARBONATE 500 MG/1
1000 TABLET, CHEWABLE ORAL PRN
Status: DISCONTINUED | OUTPATIENT
Start: 2018-01-13 | End: 2018-01-14 | Stop reason: HOSPADM

## 2018-01-13 RX ORDER — WARFARIN SODIUM 5 MG/1
5 TABLET ORAL
Status: COMPLETED | OUTPATIENT
Start: 2018-01-13 | End: 2018-01-13

## 2018-01-13 RX ORDER — FUROSEMIDE 40 MG/1
40 TABLET ORAL
Status: DISCONTINUED | OUTPATIENT
Start: 2018-01-14 | End: 2018-01-14 | Stop reason: HOSPADM

## 2018-01-13 RX ADMIN — STANDARDIZED SENNA CONCENTRATE AND DOCUSATE SODIUM 2 TABLET: 8.6; 5 TABLET, FILM COATED ORAL at 09:11

## 2018-01-13 RX ADMIN — PIPERACILLIN AND TAZOBACTAM 3.38 G: 3; .375 INJECTION, POWDER, FOR SOLUTION INTRAVENOUS at 20:47

## 2018-01-13 RX ADMIN — POTASSIUM CHLORIDE 40 MEQ: 1500 TABLET, EXTENDED RELEASE ORAL at 09:10

## 2018-01-13 RX ADMIN — CALCIUM CARBONATE (ANTACID) CHEW TAB 500 MG 1000 MG: 500 CHEW TAB at 11:30

## 2018-01-13 RX ADMIN — WARFARIN SODIUM 5 MG: 5 TABLET ORAL at 17:44

## 2018-01-13 RX ADMIN — PIPERACILLIN AND TAZOBACTAM 3.38 G: 3; .375 INJECTION, POWDER, FOR SOLUTION INTRAVENOUS at 05:16

## 2018-01-13 RX ADMIN — OXYCODONE HYDROCHLORIDE 5 MG: 5 TABLET ORAL at 20:57

## 2018-01-13 RX ADMIN — FUROSEMIDE 40 MG: 10 INJECTION, SOLUTION INTRAVENOUS at 05:17

## 2018-01-13 RX ADMIN — OXYCODONE HYDROCHLORIDE 5 MG: 5 TABLET ORAL at 14:26

## 2018-01-13 RX ADMIN — VANCOMYCIN HYDROCHLORIDE 1500 MG: 5 INJECTION, POWDER, LYOPHILIZED, FOR SOLUTION INTRAVENOUS at 09:32

## 2018-01-13 RX ADMIN — PIPERACILLIN AND TAZOBACTAM 3.38 G: 3; .375 INJECTION, POWDER, FOR SOLUTION INTRAVENOUS at 12:46

## 2018-01-13 RX ADMIN — ENOXAPARIN SODIUM 40 MG: 100 INJECTION SUBCUTANEOUS at 09:10

## 2018-01-13 RX ADMIN — VANCOMYCIN HYDROCHLORIDE 1500 MG: 5 INJECTION, POWDER, LYOPHILIZED, FOR SOLUTION INTRAVENOUS at 00:55

## 2018-01-13 ASSESSMENT — PATIENT HEALTH QUESTIONNAIRE - PHQ9
2. FEELING DOWN, DEPRESSED, IRRITABLE, OR HOPELESS: NOT AT ALL
1. LITTLE INTEREST OR PLEASURE IN DOING THINGS: NOT AT ALL
SUM OF ALL RESPONSES TO PHQ QUESTIONS 1-9: 0
SUM OF ALL RESPONSES TO PHQ9 QUESTIONS 1 AND 2: 0

## 2018-01-13 ASSESSMENT — ENCOUNTER SYMPTOMS
DIZZINESS: 0
PALPITATIONS: 0
FEVER: 0
VOMITING: 0
FOCAL WEAKNESS: 0
NAUSEA: 0
ORTHOPNEA: 0
DIARRHEA: 0
HEADACHES: 0
BLURRED VISION: 0
COUGH: 0

## 2018-01-13 ASSESSMENT — PAIN SCALES - GENERAL
PAINLEVEL_OUTOF10: 0
PAINLEVEL_OUTOF10: 4
PAINLEVEL_OUTOF10: 5

## 2018-01-13 NOTE — PROGRESS NOTES
Received report from NOC RN; assumed pt care. Pt A&Ox4, sitting up in bed. Pt denies pain at this time, states he is feeling much better. Pt currently on 4L of O2, will attempt to ween today. Pt notified to call for assistance.

## 2018-01-13 NOTE — PROGRESS NOTES
Report received from Mai MARMOLEJO. Patient sitting up in cardiac chair and wife at bedside. Pt is comfortable and denies pain at this time and has no further needs. Call light within reach and will continue to monitor.

## 2018-01-13 NOTE — PROGRESS NOTES
Pt transferred from ICU dept, a/o x 4, steady gait. POC discussed to pt re ATB / verbalized understanding.Will continue to monitor.

## 2018-01-13 NOTE — ASSESSMENT & PLAN NOTE
irn is 1.98 today, could bridge with heparin if ok with surgery, will need to get repeat echo in a few weeks.  INR 2.4

## 2018-01-13 NOTE — PROGRESS NOTES
Cardiology Progress Note               Author: James Padilla Date & Time created: 2018  8:11 AM     Interval History:  Patient with AVR metal. MV repair. PPM. Diuresed. Nl BNP.  INR therapeutic. Feels better. No pain    Chief Complaint:  SOB and abd pain    ROS    Physical Exam   Constitutional: He is oriented to person, place, and time. He appears well-developed. No distress.   HENT:   Mouth/Throat: Mucous membranes are normal.   Eyes: Conjunctivae and EOM are normal.   Neck: No JVD present. No tracheal deviation present. No thyroid mass and no thyromegaly present.   Cardiovascular: Normal rate, regular rhythm and intact distal pulses.    No murmur heard.  mech S2   Pulmonary/Chest: Effort normal and breath sounds normal. No respiratory distress. He exhibits no tenderness.   Abdominal: Soft. There is no tenderness.   Musculoskeletal: Normal range of motion. He exhibits no edema.   Neurological: He is alert and oriented to person, place, and time. He has normal strength. He displays no tremor.   Skin: Skin is warm and dry. He is not diaphoretic.   Psychiatric: He has a normal mood and affect. His behavior is normal.   Vitals reviewed.      Hemodynamics:  Temp (24hrs), Av.1 °C (98.8 °F), Min:36.8 °C (98.2 °F), Max:37.7 °C (99.9 °F)  Temperature: 37 °C (98.6 °F)  Pulse  Av.9  Min: 58  Max: 104Heart Rate (Monitored): 60  Blood Pressure: 123/55, NIBP: 142/73     Respiratory:    Respiration: (!) 24, Pulse Oximetry: 95 %, O2 Daily Delivery Respiratory : Silicone Nasal Cannula     Given By:: Mouthpiece, PEP/CPT Method: Positive Airway Pressure Device, Work Of Breathing / Effort: Mild  RUL Breath Sounds: Clear;Diminished, RML Breath Sounds: Clear;Diminished, RLL Breath Sounds: Fine Crackles;Diminished, DELORES Breath Sounds: Clear;Diminished, LLL Breath Sounds: Fine Crackles;Diminished  Fluids:  Date 18 0700 - 18 0659   Shift 3680-8704 0105-0510 3140-0659 24 Hour Total   I  N  T  A  K  E   Shift Total        O  U  T  P  U  T   Urine 400   400    Shift Total 400   400   Weight (kg) 113.6 113.6 113.6 113.6          GI/Nutrition:  Orders Placed This Encounter   Procedures   • DIET ORDER     Standing Status:   Standing     Number of Occurrences:   1     Order Specific Question:   Diet:     Answer:   Cardiac [6]     Order Specific Question:   Diet:     Answer:   2 Gram Sodium [7]     Lab Results:  Recent Labs      01/11/18   0414   01/12/18   0300  01/12/18   1002  01/13/18 0419   WBC  8.7   --   10.7   --   11.3*   RBC  3.27*   --   3.25*   --   3.35*   HEMOGLOBIN  9.8*   < >  9.9*  10.7*  10.0*   HEMATOCRIT  28.6*   < >  28.3*  30.3*  29.5*   MCV  87.5   --   87.1   --   88.1   MCH  30.0   --   30.5   --   29.9   MCHC  34.3   --   35.0   --   33.9   RDW  42.7   --   44.1   --   44.8   PLATELETCT  148*   --   190   --   266   MPV  10.3   --   9.9   --   10.3    < > = values in this interval not displayed.     Recent Labs      01/11/18 0414 01/12/18 0300 01/13/18 0419   SODIUM  133*  135  135   POTASSIUM  3.5*  3.4*  3.4*   CHLORIDE  98  98  99   CO2  27  28  28   GLUCOSE  120*  110*  99   BUN  15  14  15   CREATININE  0.92  0.83  0.94   CALCIUM  7.5*  7.7*  7.8*     Recent Labs      01/11/18 0414 01/12/18 0300 01/13/18 0419   INR  1.46*  1.98*  2.44*                     Medical Decision Making, by Problem:  Active Hospital Problems    Diagnosis   • *Acute cholecystitis [K81.0]   • Pneumonia and influenza [J11.00]   • Chronic anticoagulation [Z79.01]   • History of mechanical aortic valve replacement [Z95.2]   • TRIP on CPAP [G47.33, Z99.89]   • Hypertension [I10]   • Chronic diastolic heart failure (CMS-HCC) [I50.32]   • Jaundice [R17]   • Influenza B [J10.1]       Plan:  1. Question mild CHF but nl BNP. Ok to switch to low dose oral lasix prn. Probably can be dc home if ambulatory.  2. AVR INR therapeutic.  3. S/P choly.    Quality-Core Measures

## 2018-01-13 NOTE — PROGRESS NOTES
Tele Summary @ 1023    Rhythm : AV Paced  HR : 69  MT : 0.22  QRS : 0.14  QT : 0.42    Any further monitoring will be done by patient's Primary Nurse.

## 2018-01-13 NOTE — PROGRESS NOTES
"Pharmacy Kinetics 47 y.o. male on vancomycin day # 5 2018    Currently on Vancomycin 1500 mg iv q8hr    Indication for Treatment: PNA    Pertinent history per medical record: Admitted on 2018 for acute cholecystitis, influenza B.    Other antibiotics: Zosyn    Allergies: Lactose     List concerns for renal function (possible concerns include abnormal LFTs, BUN/SCr ratio > 20:1, CHF, obesity, malnutrition/low albumin, hypermetabolic state (SIRS), pressors/hypotension, nephrotoxic drugs, etc.):     Pertinent cultures to date:    BCx NGTD    Recent Labs      18   0414  18   0300  18   0419   WBC  8.7  10.7  11.3*   NEUTSPOLYS  76.90*  73.00*  69.10     Recent Labs      18   0414  18   0300  18   0419   BUN  15  14  15   CREATININE  0.92  0.83  0.94   ALBUMIN  2.4*  2.5*  2.6*     Recent Labs      01/10/18   1047  18   0034  18   0828   VANCOTROUGH  7.3*  10.8  18.5     Intake/Output Summary (Last 24 hours) at 18 0955  Last data filed at 18 0800   Gross per 24 hour   Intake             1845 ml   Output             3850 ml   Net            -2005 ml      Blood pressure 123/67, pulse 68, temperature 36.9 °C (98.5 °F), resp. rate (!) 24, height 1.727 m (5' 8\"), weight 113.6 kg (250 lb 7.1 oz), SpO2 96 %. Temp (24hrs), Av.1 °C (98.8 °F), Min:36.8 °C (98.2 °F), Max:37.7 °C (99.9 °F)      A/P   1. Vancomycin dose change: Continue vancomycin 1500 mg IV q8h (,,)  2. Next vancomycin level:  @ 0830  3. Goal trough: 16-20 mcg/ml  4. Comments: Will continue to monitor labs and clinical course    Moreno Boston, PharmD    "

## 2018-01-13 NOTE — PROGRESS NOTES
[facility] Hospitalist Progress Note    Date of Service: 2018    Chief Complaint  47 y.o. male admitted 2018 with CVA, CAD, HTN, apfib, pacemaker, TRIP who presented with acute cholecystitis and influenza B.     Interval Problem Update  Continues to have RUQ pain, on Zosyn. Cholecystectomy with Dr. Mcallister tomorrow.  CXR with edema. Hypoxia on 3-4L O2. On Tamiflu  TTE pending  INR 1.67 after vit K. Heparin bridge for mech valve until surgery.   pt on 4Lof o2, received 40mg lasix with good response, repeated cxr showed b/l infiltrate probably infectious, added vancomycin due to risk for MRSA infection due to + influenza, discussed with Dr Mcallister surgery regarding new finding he stated that patient is very symptomatic from his infected gallbladder and will benefit from surgery, patient will need very close monitoring during and after surgery.   Condition guarded.   1/10 s/p lap madhavi on , still feeling sob but is better, c/o abdominal pain surgical site. Had fever today, will repeat cxr in am.    feeling better, still on 4L of o2, pulmonologist and cardio consulted today.   better today, pain is better controlled, sob is improved.  No more fever. Will transfer to telemetry. F/u cxr in am.    Consultants/Specialty  General surgery    Disposition  TBD.         Review of Systems   Constitutional: Negative for chills and fever.   HENT: Negative for tinnitus.    Eyes: Negative for blurred vision and double vision.   Respiratory: Negative for cough and shortness of breath.    Cardiovascular: Negative for chest pain and palpitations.   Gastrointestinal: Negative for abdominal pain, diarrhea, nausea and vomiting.   Genitourinary: Negative for dysuria.   Skin: Negative for itching.   Neurological: Negative for dizziness, focal weakness and headaches.      Physical Exam  Laboratory/Imaging   Hemodynamics  Temp (24hrs), Av.3 °C (99.2 °F), Min:37.1 °C (98.7 °F), Max:38.1 °C (100.5 °F)   Temperature: 37.3 °C  (99.2 °F)  Pulse  Av  Min: 58  Max: 104 Heart Rate (Monitored): 65  NIBP: 138/69      Respiratory      Respiration: 19, Pulse Oximetry: 93 %, O2 Daily Delivery Respiratory : Silicone Nasal Cannula     Given By:: Mouthpiece, PEP/CPT Method: Positive Airway Pressure Device, Work Of Breathing / Effort: Mild;Shallow  RUL Breath Sounds: Clear;Diminished, RML Breath Sounds: Clear;Diminished, RLL Breath Sounds: Clear;Diminished;Fine Crackles, DELORES Breath Sounds: Clear;Diminished, LLL Breath Sounds: Clear;Diminished    Fluids    Intake/Output Summary (Last 24 hours) at 18 1836  Last data filed at 18 1800   Gross per 24 hour   Intake             2090 ml   Output             3550 ml   Net            -1460 ml       Nutrition  Orders Placed This Encounter   Procedures   • DIET ORDER     Standing Status:   Standing     Number of Occurrences:   1     Order Specific Question:   Diet:     Answer:   Cardiac [6]     Order Specific Question:   Diet:     Answer:   2 Gram Sodium [7]     Physical Exam   Constitutional: He is oriented to person, place, and time. He is cooperative. No distress.   Cardiovascular: Normal rate and regular rhythm.    Systolic click   Pulmonary/Chest: Effort normal. He has no wheezes. He has rales.   Decreased breath sound at bases   Abdominal: Soft. Bowel sounds are normal. There is tenderness (RUQ tenderness). There is no rebound and no guarding.   Musculoskeletal: He exhibits no edema.   Neurological: He is alert and oriented to person, place, and time.   Skin: No erythema.   Nursing note and vitals reviewed.      Recent Labs      01/10/18   0125   18   0414   18   1631  18   0300  18   1002   WBC  9.8   --   8.7   --    --   10.7   --    RBC  3.49*   --   3.27*   --    --   3.25*   --    HEMOGLOBIN  10.6*   < >  9.8*   < >  11.4*  9.9*  10.7*   HEMATOCRIT  30.4*   < >  28.6*   < >  32.3*  28.3*  30.3*   MCV  87.1   --   87.5   --    --   87.1   --    MCH  30.4   --    30.0   --    --   30.5   --    MCHC  34.9   --   34.3   --    --   35.0   --    RDW  41.7   --   42.7   --    --   44.1   --    PLATELETCT  139*   --   148*   --    --   190   --    MPV  10.1   --   10.3   --    --   9.9   --     < > = values in this interval not displayed.     Recent Labs      01/10/18   0125  01/11/18   0414  01/12/18   0300   SODIUM  134*  133*  135   POTASSIUM  4.0  3.5*  3.4*   CHLORIDE  102  98  98   CO2  26  27  28   GLUCOSE  132*  120*  110*   BUN  16  15  14   CREATININE  0.85  0.92  0.83   CALCIUM  7.6*  7.5*  7.7*     Recent Labs      01/10/18   1047  01/11/18   0414  01/12/18   0300   INR  1.31*  1.46*  1.98*                  Assessment/Plan     * Acute cholecystitis- (present on admission)   Assessment & Plan    Rising LFTs. US gallbladder showed cholelithiasis with wall thickening. No signs of sepsis.  - continue zosyn  - cholecystectomy with Dr. Mcallister tomorrow 6pm  - clear diet, NPO after breakfast tomorrow  - pain control  - blood cultures neg  For surgery today.  S/p lap madhavi on 1/9, did well. Pain better controlled today.   Pain is well controlled. Had erythema below umbilical surgical site no tender will continue monitoring.   Stable.         Pneumonia and influenza- (present on admission)   Assessment & Plan    Mild interstitial markings on CXR with symptoms of PND. Only trace LE edema and no JVD present. Last echo was done in Sept 2016 showed EF 60% and moderate mitral stenosis. Influenza B is positive.  - limit IVF as possible  - repeat echo  - tamiflu  cxr on 1/9 showing worsening infiltrates, possible pneumonia vs ARDS, on vanco, zosyn. o2 per protocol.   1/10 procalcitonin is elevated, continue vanc and zosyn for now, repeat cxr in am.   Due to complicated bacterial pneumonia on patient with influenza.  Improving, will get new cxr in am.        Chronic anticoagulation- (present on admission)   Assessment & Plan    pAFib and h/o mechanical aortic valve replacement. INR goal  2.5-3.5. But plans for surgery tomorrow night. INR 1.6 after vit K.   - given risks of thrombosis with mech valve and pAfib, will bridge on heparin until surgery tomorrow  Will restart heparin and warfarin when ok with surgery.   1/10 as per surgery ok to start warfarin today but wants to hold on full A/C until hb stable. Will keep monitoring if INR not therapeutic tomorrow and hb stable will probably restart heparin drip if ok with surgery.   Will start low dose lovenox today for d/c prophylaxis as per surgery recommendation.   INR 1.98 today.        History of mechanical aortic valve replacement- (present on admission)   Assessment & Plan    irn is 1.98 today, could bridge with heparin if ok with surgery, will need to get repeat echo in a few weeks.        Jaundice   Assessment & Plan    Bilirubin increased today, checking fractionated bili, hida scan today is wnl. Probably due to hepatic congestion, will f/u liver function test,   Improving today.         Chronic diastolic heart failure (CMS-HCC)- (present on admission)   Assessment & Plan    Due to mildly elevated bnp and b/l infiltrates lasix was increased today.   Cardio consulted, continue monitoring.   Better continue lasix.         Influenza B- (present on admission)   Assessment & Plan    Positive swab. Symptoms have been improving, per patient.   - given hypoxia, started on tamiflu  Now probably complicated with bacterial pneumonia, added vancomycin since there is risk for MRSA infection. Continue zosyn.   Stable .  cxr today showing stable b/l infiltrate L>R pulmonologist consulted continue current atb.   Repeat cxr in am.         TRIP on CPAP- (present on admission)   Assessment & Plan    Home CPAP at bedside.        Hypertension- (present on admission)   Assessment & Plan    Normotensive. Currently not on any home medications.   continue to monitor              Reviewed items::  Labs reviewed and Medications reviewed  Bhatia catheter::  No Bhatia  DVT  prophylaxis pharmacological::  Warfarin (Coumadin) and Enoxaparin (Lovenox)  Antibiotics:  Treating active infection/contamination beyond 24 hours perioperative coverage

## 2018-01-13 NOTE — PROGRESS NOTES
Inpatient Anticoagulation Service Note    Date: 1/13/2018  Reason for Anticoagulation: Aortic Mechanical Valve Replacement          Hemoglobin Value: (!) 10  Hematocrit Value: (!) 29.5  Lab Platelet Value: 266  Target INR: 2.5 to 3.5    INR from last 7 days     Date/Time INR Value    01/13/18 0419 (!)  2.44    01/12/18 0300 (!)  1.98    01/11/18 0414 (!)  1.46    01/10/18 1047 (!)  1.31    01/09/18 0420 (!)  1.4    01/08/18 0450 (!)  1.67    01/07/18 1015 (!)  3.02        Dose from last 7 days     Date/Time Dose (mg)    01/13/18 0900  5    01/13/18 0419  5    01/12/18 0700  5    01/11/18 0414  7.5    01/10/18 1047  7.5        Significant Interactions: Other (Comments) (Tamiflu)  Bridge Therapy: No (If less than 5 days and overlap therapy discontinued -- document reason (i.e. Bleed Risk))    (If still on overlap therapy, if No -- document reason (i.e. Bleed Risk))    Comments:  (Patient takes warfarin 5 mg daily with an occasional 7.5 mg )    Plan:  Continue warfarin 5 mg today  Education Material Provided?: No (Long-term anticoagulation patient)  Pharmacist suggested discharge dosing: Warfarin 5 mg PO daily, if clinically appropriate     Moreno Boston, PharmD

## 2018-01-13 NOTE — RESPIRATORY CARE
Cpap Note:     Patient was placed on home Cpap unit. Settings currently are EPAP 10-15 and Fio2 4 lpm Patient can demonstrate removal of mask at this time. RN agrees. If patient becomes unable to remove Bipap/Cpap, pt will be placed on supplimental O2 and MD will be notified of Pt change in status.

## 2018-01-13 NOTE — PROGRESS NOTES
Called report to [facility] MARMOLE[facility] room 218. Updated patient on room change and will bring patient to new room shortly.

## 2018-01-13 NOTE — CARE PLAN
Problem: Nutritional:  Goal: Achieve adequate nutritional intake  Diet advancement   Outcome: MET Date Met: 01/13/18  PO intake ~70% on average of recent meals. Appetite is improving per the pt.

## 2018-01-13 NOTE — FLOWSHEET NOTE
01/12/18 2110   Type of Assessment   Assessment Yes   Chest Exam   Pulse 68   Oxygen   Pulse Oximetry 96 %   Incentive Spirometer Instruct   Actual (ml) 2000   Bronchodilator Protocol   Med Order With RCP Yes - Initial Order by MD for Therapy - Follow Order for 24 Hours, Reassess Patient   Is this an exacerbation of COPD/Asthma? No   Breath Sounds Criteria 1    Benefit Criteria 1    Pulse Criteria 0    Respiratory Rate Criteria 0    S.O.B. Criteria 1    Criteria Total 3   Point Values 0-3 - PRN   Hyperinflation Protocol   I.S. Greater than 60% of Predicted IS to Nursing (all other patients)

## 2018-01-14 ENCOUNTER — APPOINTMENT (OUTPATIENT)
Dept: RADIOLOGY | Facility: MEDICAL CENTER | Age: 48
DRG: 987 | End: 2018-01-14
Attending: HOSPITALIST
Payer: COMMERCIAL

## 2018-01-14 VITALS
TEMPERATURE: 98.2 F | HEIGHT: 68 IN | BODY MASS INDEX: 37.96 KG/M2 | WEIGHT: 250.44 LBS | RESPIRATION RATE: 18 BRPM | SYSTOLIC BLOOD PRESSURE: 135 MMHG | HEART RATE: 69 BPM | DIASTOLIC BLOOD PRESSURE: 75 MMHG | OXYGEN SATURATION: 92 %

## 2018-01-14 PROBLEM — J10.1 INFLUENZA B: Status: RESOLVED | Noted: 2018-01-07 | Resolved: 2018-01-14

## 2018-01-14 PROBLEM — R17 JAUNDICE: Status: RESOLVED | Noted: 2018-01-11 | Resolved: 2018-01-14

## 2018-01-14 PROBLEM — K81.0 ACUTE CHOLECYSTITIS: Status: RESOLVED | Noted: 2018-01-07 | Resolved: 2018-01-14

## 2018-01-14 PROBLEM — I50.32 CHRONIC DIASTOLIC HEART FAILURE (HCC): Status: RESOLVED | Noted: 2018-01-11 | Resolved: 2018-01-14

## 2018-01-14 LAB
ANION GAP SERPL CALC-SCNC: 8 MMOL/L (ref 0–11.9)
BILE AC SERPL-SCNC: 8 UMOL/L (ref 0–7)
BUN SERPL-MCNC: 19 MG/DL (ref 8–22)
CALCIUM SERPL-MCNC: 8.3 MG/DL (ref 8.4–10.2)
CDCAE SERPL-SCNC: 5 UMOL/L (ref 0–3.4)
CHLORIDE SERPL-SCNC: 102 MMOL/L (ref 96–112)
CHOLATE SERPL-SCNC: 2.4 UMOL/L (ref 0–1.9)
CO2 SERPL-SCNC: 27 MMOL/L (ref 20–33)
CREAT SERPL-MCNC: 0.99 MG/DL (ref 0.5–1.4)
DO-CHOLATE SERPL-SCNC: 0.3 UMOL/L (ref 0–2.5)
GLUCOSE SERPL-MCNC: 100 MG/DL (ref 65–99)
INR PPP: 2.88 (ref 0.87–1.13)
POTASSIUM SERPL-SCNC: 3.8 MMOL/L (ref 3.6–5.5)
PROTHROMBIN TIME: 29.6 SEC (ref 12–14.6)
SODIUM SERPL-SCNC: 137 MMOL/L (ref 135–145)
URSODEOXYCHOLATE SERPL-SCNC: 0.3 UMOL/L (ref 0–1)

## 2018-01-14 PROCEDURE — 700102 HCHG RX REV CODE 250 W/ 637 OVERRIDE(OP): Performed by: HOSPITALIST

## 2018-01-14 PROCEDURE — 80048 BASIC METABOLIC PNL TOTAL CA: CPT

## 2018-01-14 PROCEDURE — 85610 PROTHROMBIN TIME: CPT

## 2018-01-14 PROCEDURE — 71046 X-RAY EXAM CHEST 2 VIEWS: CPT

## 2018-01-14 PROCEDURE — 700105 HCHG RX REV CODE 258: Performed by: HOSPITALIST

## 2018-01-14 PROCEDURE — 99239 HOSP IP/OBS DSCHRG MGMT >30: CPT | Performed by: HOSPITALIST

## 2018-01-14 PROCEDURE — A9270 NON-COVERED ITEM OR SERVICE: HCPCS | Performed by: HOSPITALIST

## 2018-01-14 PROCEDURE — 94660 CPAP INITIATION&MGMT: CPT

## 2018-01-14 PROCEDURE — 94760 N-INVAS EAR/PLS OXIMETRY 1: CPT

## 2018-01-14 PROCEDURE — 36415 COLL VENOUS BLD VENIPUNCTURE: CPT

## 2018-01-14 PROCEDURE — 700111 HCHG RX REV CODE 636 W/ 250 OVERRIDE (IP): Performed by: HOSPITALIST

## 2018-01-14 RX ORDER — WARFARIN SODIUM 5 MG/1
5 TABLET ORAL
Status: DISCONTINUED | OUTPATIENT
Start: 2018-01-14 | End: 2018-01-14 | Stop reason: HOSPADM

## 2018-01-14 RX ORDER — OXYCODONE HYDROCHLORIDE 5 MG/1
5 TABLET ORAL EVERY 4 HOURS PRN
Qty: 18 TAB | Refills: 0 | Status: SHIPPED | OUTPATIENT
Start: 2018-01-14 | End: 2018-01-17

## 2018-01-14 RX ORDER — AMOXICILLIN AND CLAVULANATE POTASSIUM 875; 125 MG/1; MG/1
1 TABLET, FILM COATED ORAL 2 TIMES DAILY
Qty: 10 TAB | Refills: 0 | Status: SHIPPED | OUTPATIENT
Start: 2018-01-14 | End: 2018-01-19

## 2018-01-14 RX ADMIN — STANDARDIZED SENNA CONCENTRATE AND DOCUSATE SODIUM 2 TABLET: 8.6; 5 TABLET, FILM COATED ORAL at 08:00

## 2018-01-14 RX ADMIN — PIPERACILLIN AND TAZOBACTAM 3.38 G: 3; .375 INJECTION, POWDER, FOR SOLUTION INTRAVENOUS at 12:40

## 2018-01-14 RX ADMIN — OXYCODONE HYDROCHLORIDE 5 MG: 5 TABLET ORAL at 15:45

## 2018-01-14 RX ADMIN — OXYCODONE HYDROCHLORIDE 5 MG: 5 TABLET ORAL at 04:19

## 2018-01-14 RX ADMIN — FUROSEMIDE 40 MG: 40 TABLET ORAL at 08:00

## 2018-01-14 RX ADMIN — POTASSIUM CHLORIDE 40 MEQ: 1500 TABLET, EXTENDED RELEASE ORAL at 08:00

## 2018-01-14 RX ADMIN — OXYCODONE HYDROCHLORIDE 5 MG: 5 TABLET ORAL at 08:00

## 2018-01-14 RX ADMIN — PIPERACILLIN AND TAZOBACTAM 3.38 G: 3; .375 INJECTION, POWDER, FOR SOLUTION INTRAVENOUS at 04:40

## 2018-01-14 ASSESSMENT — PAIN SCALES - GENERAL
PAINLEVEL_OUTOF10: 5
PAINLEVEL_OUTOF10: 4

## 2018-01-14 NOTE — DISCHARGE SUMMARY
"CHIEF COMPLAINT ON ADMISSION  Chief Complaint   Patient presents with   • Shortness of Breath     \"for quite a while\"   • Low Back Pain     HX of same, started getting worse yesterday   • Abdominal Pain     \"for quite a while\"       CODE STATUS  Full Code    HPI & HOSPITAL COURSE  Please see original H&P and consult note for specific information, patient was admitted due to acute cholecystitis patient with significant history of cardiac problems has metallic aortic valve replacement, on warfarin, h/o afib pacemaker due to heart block, patient was on warfarin his was stopped and started bridging with heparin, patient had also influenza and developed pneumonia patient was able to go for surgery and did well in the post op, he was transferred to ICU for close monitoring, cardiologist and pulmonologist were consulted due to patient possible in acute chf vs worsening pneumonia with respiratory failure, patient was started on lasix and broad spectrum atb, as per surgery heparin drip could not be started due to risk of bleeding in the post op, patient had elevated bilirubin HIDA scan was done which was wnl bilirubin are trending down now, patient gradually improved and now he is feeling much better, he is tolerating diet, he is off o2 on RA, surgery signed off and pulmonologist cleared patient for d/c. Cardiologist recommended to continue warfarin and f/u in the office in 2 weeks to repeat echo. Patient will be d/c home today, he is alert and oriented and follows commands, he expressed understanding of his d/c plan and agreed with it. He will f/u with surgery in 1 week and with pcp in 1 week and with cardio in 21 weeks.     Therefore, he is discharged in good and stable condition with close outpatient follow-up.    In prescribing controlled substances to this patient, I certify that I have obtained and reviewed the medical history of Brandon Winters. I have also made a good saul effort to obtain applicable " records from other providers who have treated the patient and records did not demonstrate any increased risk of substance abuse that would prevent me from prescribing controlled substances.     I have conducted a physical exam and documented it. I have reviewed Mr. Nolasco’s prescription history as maintained by the [place] Prescription Monitoring Program.     I have assessed the patient’s risk for abuse, dependency, and addiction using the validated Opioid Risk Tool available at https://www.mdcalc.com/ixbjeb-lmpg-lbqw-ort-narcotic-abuse.     Given the above, I believe the benefits of controlled substance therapy outweigh the risks. The reasons for prescribing controlled substances include non-narcotic, oral analgesic alternatives are contraindicated since patient in on warfarin with a therapeutic INR of 2.5-3.5 NSAIDS are high risk for bleeding. Accordingly, I have discussed the risk and benefits, treatment plan, and alternative therapies with the patient.     Physical Exam   Constitutional: He is oriented to person, place, and time and well-developed, well-nourished, and in no distress. No distress.   HENT:   Head: Normocephalic.   Mouth/Throat: No oropharyngeal exudate.   Eyes: Conjunctivae are normal. No scleral icterus.   Neck: Neck supple. No JVD present.   Cardiovascular: Normal rate and regular rhythm.    Murmur heard.  Pulmonary/Chest: Effort normal and breath sounds normal. No respiratory distress. He has no wheezes. He has no rales.   Abdominal: Soft. Bowel sounds are normal. He exhibits no distension. There is no tenderness. There is no rebound.   Musculoskeletal: Normal range of motion. He exhibits no tenderness.   Neurological: He is alert and oriented to person, place, and time. He exhibits normal muscle tone.   Psychiatric: Affect normal.   Nursing note and vitals reviewed.      SPECIFIC OUTPATIENT FOLLOW-UP  PCP  CARDIO  SURGERY    DISCHARGE PROBLEM LIST  Principal Problem (Resolved):    Acute  cholecystitis POA: Yes  Active Problems:    History of mechanical aortic valve replacement POA: Yes      Overview: January 2007.    Chronic anticoagulation POA: Yes    Hypertension POA: Yes    TRIP on CPAP (Chronic) POA: Yes  Resolved Problems:    Pneumonia and influenza POA: Yes    Influenza B POA: Yes    Chronic diastolic heart failure (CMS-HCC) POA: Yes    Jaundice POA: Unknown      FOLLOW UP  No future appointments.  Brandon Arreola M.D.  [address]  [address]  [facility] [address]  [phone]    Schedule an appointment as soon as possible for a visit in 1 week  Hospital follow-up appointment with PCP      MEDICATIONS ON DISCHARGE   Brandon Nolasco   Home Medication Instructions YOSELIN:[id number]    Printed on:01/14/18 9906   Medication Information                      amoxicillin-clavulanate (AUGMENTIN) 875-125 MG Tab  Take 1 Tab by mouth 2 times a day for 5 days.             warfarin (COUMADIN) 5 MG Tab  Take one tablet by mouth once daily as instructed by [facility] anticoagulation services                 DIET  Orders Placed This Encounter   Procedures   • DIET ORDER     Standing Status:   Standing     Number of Occurrences:   1     Order Specific Question:   Diet:     Answer:   Cardiac [6]     Order Specific Question:   Diet:     Answer:   2 Gram Sodium [7]       ACTIVITY  As per gen surgery recommendations.       CONSULTATIONS  gen surgery  pulm  cardio    PROCEDURES  Lap madhavi see OR note    LABORATORY  Lab Results   Component Value Date/Time    SODIUM 137 01/14/2018 05:07 AM    POTASSIUM 3.8 01/14/2018 05:07 AM    CHLORIDE 102 01/14/2018 05:07 AM    CO2 27 01/14/2018 05:07 AM    GLUCOSE 100 (H) 01/14/2018 05:07 AM    BUN 19 01/14/2018 05:07 AM    CREATININE 0.99 01/14/2018 05:07 AM        Lab Results   Component Value Date/Time    WBC 11.3 (H) 01/13/2018 04:19 AM    HEMOGLOBIN 10.0 (L) 01/13/2018 04:19 AM    HEMATOCRIT 29.5 (L) 01/13/2018 04:19 AM    PLATELETCT 266 01/13/2018 04:19 AM         Total time of the discharge process exceeds 40 minutes

## 2018-01-14 NOTE — CARE PLAN
Problem: Discharge Barriers/Planning  Goal: Patient's continuum of care needs will be met  Outcome: PROGRESSING AS EXPECTED  Updated pt on POC, all questions answered, wife at bedside.     Problem: Respiratory:  Goal: Respiratory status will improve  Outcome: PROGRESSING AS EXPECTED  Attempting to ween pt from O2, currently on 1L.

## 2018-01-14 NOTE — CARE PLAN
Problem: Safety  Goal: Will remain free from falls    Intervention: Assess risk factors for falls  High fall risk, safety precautions in place, pt calls for assistance, refusing bed alarm

## 2018-01-14 NOTE — PROGRESS NOTES
[facility] Hospitalist Progress Note    Date of Service: 2018    Chief Complaint  47 y.o. male admitted 2018 with CVA, CAD, HTN, apfib, pacemaker, TRIP who presented with acute cholecystitis and influenza B.     Interval Problem Update  Continues to have RUQ pain, on Zosyn. Cholecystectomy with Dr. Mcallister tomorrow.  CXR with edema. Hypoxia on 3-4L O2. On Tamiflu  TTE pending  INR 1.67 after vit K. Heparin bridge for mech valve until surgery.   pt on 4Lof o2, received 40mg lasix with good response, repeated cxr showed b/l infiltrate probably infectious, added vancomycin due to risk for MRSA infection due to + influenza, discussed with Dr Mcallister surgery regarding new finding he stated that patient is very symptomatic from his infected gallbladder and will benefit from surgery, patient will need very close monitoring during and after surgery.   Condition guarded.   1/10 s/p lap madhavi on , still feeling sob but is better, c/o abdominal pain surgical site. Had fever today, will repeat cxr in am.    feeling better, still on 4L of o2, pulmonologist and cardio consulted today.   better today, pain is better controlled, sob is improved.  No more fever. Will transfer to telemetry. F/u cxr in am.   feeling much better, no fever, no chills, no pain , tolerating po intake and ambulating.   Consultants/Specialty  General surgery  Cardio  Pulmonologist.     Disposition  TBD.         Review of Systems   Constitutional: Negative for fever.   HENT: Negative for tinnitus.    Eyes: Negative for blurred vision.   Respiratory: Negative for cough.    Cardiovascular: Negative for chest pain, palpitations and orthopnea.   Gastrointestinal: Negative for diarrhea, nausea and vomiting.   Genitourinary: Negative for dysuria.   Skin: Negative for itching.   Neurological: Negative for dizziness, focal weakness and headaches.      Physical Exam  Laboratory/Imaging   Hemodynamics  Temp (24hrs), Av °C (98.6 °F), Min:36.8 °C  (98.2 °F), Max:37.7 °C (99.9 °F)   Temperature: 36.8 °C (98.2 °F)  Pulse  Av.8  Min: 58  Max: 104 Heart Rate (Monitored): 60  Blood Pressure: 111/55, NIBP: 142/73      Respiratory      Respiration: 20, Pulse Oximetry: 97 %, O2 Daily Delivery Respiratory : Silicone Nasal Cannula     PEP/CPT Method: Positive Airway Pressure Device, Work Of Breathing / Effort: Mild  RUL Breath Sounds: Clear;Diminished, RML Breath Sounds: Clear;Diminished, RLL Breath Sounds: Fine Crackles;Diminished, DELORES Breath Sounds: Clear;Diminished, LLL Breath Sounds: Fine Crackles;Diminished    Fluids    Intake/Output Summary (Last 24 hours) at 18 1703  Last data filed at 18 0800   Gross per 24 hour   Intake             1080 ml   Output             2450 ml   Net            -1370 ml       Nutrition  Orders Placed This Encounter   Procedures   • DIET ORDER     Standing Status:   Standing     Number of Occurrences:   1     Order Specific Question:   Diet:     Answer:   Cardiac [6]     Order Specific Question:   Diet:     Answer:   2 Gram Sodium [7]     Physical Exam   Constitutional: He is oriented to person, place, and time. He is cooperative. No distress.   Cardiovascular: Normal rate and regular rhythm.    Systolic click   Pulmonary/Chest: Effort normal. He has no wheezes. He has no rales.   Decreased breath sound at bases   Abdominal: Soft. Bowel sounds are normal. There is no tenderness. There is no rebound and no guarding.   Musculoskeletal: He exhibits no edema.   Neurological: He is alert and oriented to person, place, and time.   Skin: No erythema.   Nursing note and vitals reviewed.      Recent Labs      18   0414   18   0300  18   1002  18   0419   WBC  8.7   --   10.7   --   11.3*   RBC  3.27*   --   3.25*   --   3.35*   HEMOGLOBIN  9.8*   < >  9.9*  10.7*  10.0*   HEMATOCRIT  28.6*   < >  28.3*  30.3*  29.5*   MCV  87.5   --   87.1   --   88.1   MCH  30.0   --   30.5   --   29.9   MCHC  34.3   --    35.0   --   33.9   RDW  42.7   --   44.1   --   44.8   PLATELETCT  148*   --   190   --   266   MPV  10.3   --   9.9   --   10.3    < > = values in this interval not displayed.     Recent Labs      01/11/18 0414 01/12/18 0300 01/13/18 0419   SODIUM  133*  135  135   POTASSIUM  3.5*  3.4*  3.4*   CHLORIDE  98  98  99   CO2  27  28  28   GLUCOSE  120*  110*  99   BUN  15  14  15   CREATININE  0.92  0.83  0.94   CALCIUM  7.5*  7.7*  7.8*     Recent Labs      01/11/18 0414 01/12/18 0300 01/13/18 0419   INR  1.46*  1.98*  2.44*                  Assessment/Plan     * Acute cholecystitis- (present on admission)   Assessment & Plan    Rising LFTs. US gallbladder showed cholelithiasis with wall thickening. No signs of sepsis.  - continue zosyn  - cholecystectomy with Dr. Mcallister tomorrow 6pm  - clear diet, NPO after breakfast tomorrow  - pain control  - blood cultures neg  For surgery today.  S/p lap madhavi on 1/9, did well. Pain better controlled today.   Pain is well controlled. Had erythema below umbilical surgical site no tender will continue monitoring.   Stable.         Pneumonia and influenza- (present on admission)   Assessment & Plan    Mild interstitial markings on CXR with symptoms of PND. Only trace LE edema and no JVD present. Last echo was done in Sept 2016 showed EF 60% and moderate mitral stenosis. Influenza B is positive.  - limit IVF as possible  - repeat echo  - tamiflu  cxr on 1/9 showing worsening infiltrates, possible pneumonia vs ARDS, on vanco, zosyn. o2 per protocol.   1/10 procalcitonin is elevated, continue vanc and zosyn for now, repeat cxr in am.   Due to complicated bacterial pneumonia on patient with influenza.  Improving, will get new cxr in am.  cxr did not show much improvement but he is feeling better trying to wean off o2. Will stop vanco since cx neg, procalcitonin trending down, continue zosyn.          Chronic anticoagulation- (present on admission)   Assessment & Plan     pAFib and h/o mechanical aortic valve replacement. INR goal 2.5-3.5. But plans for surgery tomorrow night. INR 1.6 after vit K.   - given risks of thrombosis with mech valve and pAfib, will bridge on heparin until surgery tomorrow  Will restart heparin and warfarin when ok with surgery.   1/10 as per surgery ok to start warfarin today but wants to hold on full A/C until hb stable. Will keep monitoring if INR not therapeutic tomorrow and hb stable will probably restart heparin drip if ok with surgery.   Will start low dose lovenox today for d/c prophylaxis as per surgery recommendation.   INR 2.4 today.        History of mechanical aortic valve replacement- (present on admission)   Assessment & Plan    irn is 1.98 today, could bridge with heparin if ok with surgery, will need to get repeat echo in a few weeks.  INR 2.4        Jaundice   Assessment & Plan    Bilirubin increased today, checking fractionated bili, hida scan today is wnl. Probably due to hepatic congestion, will f/u liver function test,   Improving        Chronic diastolic heart failure (CMS-HCC)- (present on admission)   Assessment & Plan    Due to mildly elevated bnp and b/l infiltrates lasix was increased today.   Cardio consulted, continue monitoring.   Better continue lasix.   Stable neg balance 515cc.        Influenza B- (present on admission)   Assessment & Plan    Positive swab. Symptoms have been improving, per patient.   - given hypoxia, started on tamiflu  Now probably complicated with bacterial pneumonia, added vancomycin since there is risk for MRSA infection. Continue zosyn.   Stable .  cxr today showing stable b/l infiltrate L>R pulmonologist consulted continue current atb.   cxr today stable.         TRIP on CPAP- (present on admission)   Assessment & Plan    Home CPAP at bedside.        Hypertension- (present on admission)   Assessment & Plan    Normotensive. Currently not on any home medications.   continue to monitor               Reviewed items::  Labs reviewed and Medications reviewed  Bhatia catheter::  No Bhatia  DVT prophylaxis pharmacological::  Warfarin (Coumadin)  Antibiotics:  Treating active infection/contamination beyond 24 hours perioperative coverage

## 2018-01-14 NOTE — DISCHARGE INSTRUCTIONS
F/u with cardiologist in 2 weeks  F/u with sleep medicine in 2 weeks  F/u with surgery in 1 week  F/u with PCP in 1 week.  Discharge Instructions    Discharged to home by car with relative. Discharged via wheelchair, hospital escort: Yes.  Special equipment needed: Not Applicable    Be sure to schedule a follow-up appointment with your primary care doctor or any specialists as instructed.     Discharge Plan:   Influenza Vaccine Indication: Not indicated: Previously immunized this influenza season and > 8 years of age    I understand that a diet low in cholesterol, fat, and sodium is recommended for good health. Unless I have been given specific instructions below for another diet, I accept this instruction as my diet prescription.   Other diet: Regular    Special Instructions: None    · Is patient discharged on Warfarin / Coumadin?   Yes    You are receiving the drug warfarin. Please understand the importance of monitoring warfarin with scheduled PT/INR blood draws.  Follow-up with a call to your personal Doctor's office in 3 days to schedule a PT/INR. .    IMPORTANT: HOW TO USE THIS INFORMATION:  This is a summary and does NOT have all possible information about this product. This information does not assure that this product is safe, effective, or appropriate for you. This information is not individual medical advice and does not substitute for the advice of your health care professional. Always ask your health care professional for complete information about this product and your specific health needs.      WARFARIN - ORAL (WARF-uh-rin)      COMMON BRAND NAME(S): Coumadin      WARNING:  Warfarin can cause very serious (possibly fatal) bleeding. This is more likely to occur when you first start taking this medication or if you take too much warfarin. To decrease your risk for bleeding, your doctor or other health care provider will monitor you closely and check your lab results (INR test) to make sure you are not  "taking too much warfarin. Keep all medical and laboratory appointments. Tell your doctor right away if you notice any signs of serious bleeding. See also Side Effects section.      USES:  This medication is used to treat blood clots (such as in deep vein thrombosis-DVT or pulmonary embolus-PE) and/or to prevent new clots from forming in your body. Preventing harmful blood clots helps to reduce the risk of a stroke or heart attack. Conditions that increase your risk of developing blood clots include a certain type of irregular heart rhythm (atrial fibrillation), heart valve replacement, recent heart attack, and certain surgeries (such as hip/knee replacement). Warfarin is commonly called a \"blood thinner,\" but the more correct term is \"anticoagulant.\" It helps to keep blood flowing smoothly in your body by decreasing the amount of certain substances (clotting proteins) in your blood.      HOW TO USE:  Read the Medication Guide provided by your pharmacist before you start taking warfarin and each time you get a refill. If you have any questions, ask your doctor or pharmacist. Take this medication by mouth with or without food as directed by your doctor or other health care professional, usually once a day. It is very important to take it exactly as directed. Do not increase the dose, take it more frequently, or stop using it unless directed by your doctor. Dosage is based on your medical condition, laboratory tests (such as INR), and response to treatment. Your doctor or other health care provider will monitor you closely while you are taking this medication to determine the right dose for you. Use this medication regularly to get the most benefit from it. To help you remember, take it at the same time each day. It is important to eat a balanced, consistent diet while taking warfarin. Some foods can affect how warfarin works in your body and may affect your treatment and dose. Avoid sudden large increases or decreases " in your intake of foods high in vitamin K (such as broccoli, cauliflower, cabbage, brussels sprouts, kale, spinach, and other green leafy vegetables, liver, green tea, certain vitamin supplements). If you are trying to lose weight, check with your doctor before you try to go on a diet. Cranberry products may also affect how your warfarin works. Limit the amount of cranberry juice (16 ounces/480 milliliters a day) or other cranberry products you may drink or eat.      SIDE EFFECTS:  Nausea, loss of appetite, or stomach/abdominal pain may occur. If any of these effects persist or worsen, tell your doctor or pharmacist promptly. Remember that your doctor has prescribed this medication because he or she has judged that the benefit to you is greater than the risk of side effects. Many people using this medication do not have serious side effects. This medication can cause serious bleeding if it affects your blood clotting proteins too much (shown by unusually high INR lab results). Even if your doctor stops your medication, this risk of bleeding can continue for up to a week. Tell your doctor right away if you have any signs of serious bleeding, including: unusual pain/swelling/discomfort, unusual/easy bruising, prolonged bleeding from cuts or gums, persistent/frequent nosebleeds, unusually heavy/prolonged menstrual flow, pink/dark urine, coughing up blood, vomit that is bloody or looks like coffee grounds, severe headache, dizziness/fainting, unusual or persistent tiredness/weakness, bloody/black/tarry stools, chest pain, shortness of breath, difficulty swallowing. Tell your doctor right away if any of these unlikely but serious side effects occur: persistent nausea/vomiting, severe stomach/abdominal pain, yellowing eyes/skin. This drug rarely has caused very serious (possibly fatal) problems if its effects lead to small blood clots (usually at the beginning of treatment). This can lead to severe skin/tissue damage that  may require surgery or amputation if left untreated. Patients with certain blood conditions (protein C or S deficiency) may be at greater risk. Get medical help right away if any of these rare but serious side effects occur: painful/red/purplish patches on the skin (such as on the toe, breast, abdomen), change in the amount of urine, vision changes, confusion, slurred speech, weakness on one side of the body. A very serious allergic reaction to this drug is rare. However, get medical help right away if you notice any symptoms of a serious allergic reaction, including: rash, itching/swelling (especially of the face/tongue/throat), severe dizziness, trouble breathing. This is not a complete list of possible side effects. If you notice other effects not listed above, contact your doctor or pharmacist. In the [place] - Call your doctor for medical advice about side effects. You may report side effects to FDA at 0-129-TUM-4310. In [place] - Call your doctor for medical advice about side effects. You may report side effects to [facility] [place] at 1-934.180.7949.      PRECAUTIONS:  Before taking warfarin, tell your doctor or pharmacist if you are allergic to it; or if you have any other allergies. This product may contain inactive ingredients, which can cause allergic reactions or other problems. Talk to your pharmacist for more details. Before using this medication, tell your doctor or pharmacist your medical history, especially of: blood disorders (such as anemia, hemophilia), bleeding problems (such as bleeding of the stomach/intestines, bleeding in the brain), blood vessel disorders (such as aneurysms), recent major injury/surgery, liver disease, alcohol use, mental/mood disorders (including memory problems), frequent falls/injuries. It is important that all your doctors and dentists know that you take warfarin. Before having surgery or any medical/dental procedures, tell your doctor or dentist that you are taking this  medication and about all the products you use (including prescription drugs, nonprescription drugs, and herbal products). Avoid getting injections into the muscles. If you must have an injection into a muscle (for example, a flu shot), it should be given in the arm. This way, it will be easier to check for bleeding and/or apply pressure bandages. This medication may cause stomach bleeding. Daily use of alcohol while using this medicine will increase your risk for stomach bleeding and may also affect how this medication works. Limit or avoid alcoholic beverages. If you have not been eating well, if you have an illness or infection that causes fever, vomiting, or diarrhea for more than 2 days, or if you start using any antibiotic medications, contact your doctor or pharmacist immediately because these conditions can affect how warfarin works. This medication can cause heavy bleeding. To lower the chance of getting cut, bruised, or injured, use great caution with sharp objects like safety razors and nail cutters. Use an electric razor when shaving and a soft toothbrush when brushing your teeth. Avoid activities such as contact sports. If you fall or injure yourself, especially if you hit your head, call your doctor immediately. Your doctor may need to check you. The Food & Drug Administration has stated that generic warfarin products are interchangeable. However, consult your doctor or pharmacist before switching warfarin products. Be careful not to take more than one medication that contains warfarin unless specifically directed by the doctor or health care provider who is monitoring your warfarin treatment. Older adults may be at greater risk for bleeding while using this drug. This medication is not recommended for use during pregnancy because of serious (possibly fatal) harm to an unborn baby. Discuss the use of reliable forms of birth control with your doctor. If you become pregnant or think you may be pregnant,  "tell your doctor immediately. If you are planning pregnancy, discuss a plan for managing your condition with your doctor before you become pregnant. Your doctor may switch the type of medication you use during pregnancy. Very small amounts of this medication may pass into breast milk but is unlikely to harm a nursing infant. Consult your doctor before breast-feeding.      DRUG INTERACTIONS:  Drug interactions may change how your medications work or increase your risk for serious side effects. This document does not contain all possible drug interactions. Keep a list of all the products you use (including prescription/nonprescription drugs and herbal products) and share it with your doctor and pharmacist. Do not start, stop, or change the dosage of any medicines without your doctor's approval. Warfarin interacts with many prescription, nonprescription, vitamin, and herbal products. This includes medications that are applied to the skin or inside the vagina or rectum. The interactions with warfarin usually result in an increase or decrease in the \"blood-thinning\" (anticoagulant) effect. Your doctor or other health care professional should closely monitor you to prevent serious bleeding or clotting problems. While taking warfarin, it is very important to tell your doctor or pharmacist of any changes in medications, vitamins, or herbal products that you are taking. Some products that may interact with this drug include: capecitabine, imatinib, mifepristone. Aspirin, aspirin-like drugs (salicylates), and nonsteroidal anti-inflammatory drugs (NSAIDs such as ibuprofen, naproxen, celecoxib) may have effects similar to warfarin. These drugs may increase the risk of bleeding problems if taken during treatment with warfarin. Carefully check all prescription/nonprescription product labels (including drugs applied to the skin such as pain-relieving creams) since the products may contain NSAIDs or salicylates. Talk to your doctor " about using a different medication (such as acetaminophen) to treat pain/fever. Low-dose aspirin and related drugs (such as clopidogrel, ticlopidine) should be continued if prescribed by your doctor for specific medical reasons such as heart attack or stroke prevention. Consult your doctor or pharmacist for more details. Many herbal products interact with warfarin. Tell your doctor before taking any herbal products, especially bromelains, coenzyme Q10, cranberry, danshen, dong quai, fenugreek, garlic, ginkgo biloba, ginseng, and Deo's wort, among others. This medication may interfere with a certain laboratory test to measure theophylline levels, possibly causing false test results. Make sure laboratory personnel and all your doctors know you use this drug.      OVERDOSE:  If overdose is suspected, contact a poison control center or emergency room immediately. [place] residents can call the [place] National Poison Hotline at 1-702.453.2702. [place] residents can call a provincial poison control center. Symptoms of overdose may include: bloody/black/tarry stools, pink/dark urine, unusual/prolonged bleeding.      NOTES:  Do not share this medication with others. Laboratory and/or medical tests (such as INR, complete blood count) must be performed periodically to monitor your progress or check for side effects. Consult your doctor for more details.      MISSED DOSE:  For the best possible benefit, do not miss any doses. If you do miss a dose and remember on the same day, take it as soon as you remember. If you remember on the next day, skip the missed dose and resume your usual dosing schedule. Do not double the dose to catch up because this could increase your risk for bleeding. Keep a record of missed doses to give to your doctor or pharmacist. Contact your doctor or pharmacist if you miss 2 or more doses in a row.      STORAGE:  Store at room temperature away from light and moisture. Do not store in the bathroom. Keep all  medications away from children and pets. Do not flush medications down the toilet or pour them into a drain unless instructed to do so. Properly discard this product when it is  or no longer needed. Consult your pharmacist or local waste disposal company for more details about how to safely discard your product.      MEDICAL ALERT:  Your condition and medication can cause complications in a medical emergency. For information about enrolling in MedicAlert, call 1-708.666.2681 ([place]) or 1-358.463.4868 ([place]).      Information last revised 2010 Copyright(c) 2010 [employer]             · Is patient Post Blood Transfusion?  No    Depression / Suicide Risk    As you are discharged from this [facility] facility, it is important to learn how to keep safe from harming yourself.    Recognize the warning signs:  · Abrupt changes in personality, positive or negative- including increase in energy   · Giving away possessions  · Change in eating patterns- significant weight changes-  positive or negative  · Change in sleeping patterns- unable to sleep or sleeping all the time   · Unwillingness or inability to communicate  · Depression  · Unusual sadness, discouragement and loneliness  · Talk of wanting to die  · Neglect of personal appearance   · Rebelliousness- reckless behavior  · Withdrawal from people/activities they love  · Confusion- inability to concentrate     If you or a loved one observes any of these behaviors or has concerns about self-harm, here's what you can do:  · Talk about it- your feelings and reasons for harming yourself  · Remove any means that you might use to hurt yourself (examples: pills, rope, extension cords, firearm)  · Get professional help from the community (Mental Health, Substance Abuse, psychological counseling)  · Do not be alone:Call your Safe Contact- someone whom you trust who will be there for you.  · Call your local CRISIS HOTLINE 622-2107 or 006-523-6988  · Call  your local Children's Mobile Crisis Response Team [place] (275) 795-9767 or www.[place].COFCO  · Call the toll free National Suicide Prevention Hotlines   · National Suicide Prevention Lifeline 424-817-AVOO (2364)  · National Hope Line Network 800-SUICIDE (047-6654)

## 2018-01-14 NOTE — PROGRESS NOTES
Inpatient Anticoagulation Service Note    Date: 2018  Reason for Anticoagulation: Aortic Mechanical Valve Replacement    Hemoglobin Value: (!) 10  Hematocrit Value: (!) 29.5  Lab Platelet Value: 266  Target INR: 2.5 to 3.5    INR from last 7 days     Date/Time INR Value    18 0507 (!)  2.88    18 0419 (!)  2.44    18 0300 (!)  1.98    18 0414 (!)  1.46    01/10/18 1047 (!)  1.31    18 0420 (!)  1.4    18 0450 (!)  1.67    18 1015 (!)  3.02        Dose from last 7 days     Date/Time Dose (mg)    18 0600  5    18 0900  5    18 0419  5    18 0700  5    18 0414  7.5    01/10/18 1047  7.5        Significant Interactions: Zosyn  Bridge Therapy: No     Comments:  (Patient takes warfarin 5 mg daily with an occasional 7.5 mg )    Plan:  Coumadin 5 mg PO tonight for INR 2.88  Education Material Provided?: No (Long-term anticoagulation patient)  Pharmacist suggested discharge dosin mg daily, except 7.5 mg      Meryl FaustD

## 2018-01-14 NOTE — PROGRESS NOTES
Received report from NOC RN; assumed pt care. Pt A&Ox4, sitting up in bed, be sating at 98% on 1L, removed O2. Pt complains of 4/10 abdomen pain, 4 lap stabs noted, CDI, bruising. Pt states he feels much better and wants to go home. Pt notified to call for assistance, call light within reach.

## 2018-01-14 NOTE — PROGRESS NOTES
Telemetry Report: pt has been 100% atrial pacing.  HR: 60s-120s  Measurements: (-/14/36)  Ectopy: f PVC, rare couplet    Measurements and updates per Calos ARROYO. This RN is responsible solely for telemetry strip interpretation and primary RN is responsible for acute changes in telemetry monitoring.

## 2018-01-15 NOTE — PROGRESS NOTES
Pulmonary Critical Care Progress Note        Chief Complaint: Shortness of breath, low back pain, and abdominal pain,    Reason for Consultation: Respiratory failure and an abnormal x-ray    Referred by Dr. Benjie Pina    History of Present Illness:     47 y.o. male admitted for  treatment of acute cholecystitis and influenza B.    ROS:  Respiratory: Positive for shortness of breath , Cardiac: Negative , GI: positive abdominal pain.  All other systems negative.    Interval Events:  24 hour interval history reviewed    1/11 -Dr. Rodney Mcallister performed a laparoscopic cholecystectomy on 1/9/2018 for acute cholecystitis. The patient was noted to have hepatic congestion at the time of the procedure. Postoperatively, the patient has required oxygen therapy for respiratory failure and his x-ray shown diffuse left greater than right-sided pulmonary infiltrates. A 18 echo showed that he had an ejection fraction of 65%, a prior mitral valve repair, and RVSP of 65 mmHg, and possibly abnormal diastolic function. He is +1.2 L this admission and -650 mL the last 24 hours.      He is white count today is 8700, hemoglobin 9.8, hematocrit 28.6. His chemistries today show sodium of 133, potassium of 3.5, and SGOT of 89, and SGPT of 60, a total bilirubin of 15.1, and an albumin of 2.4. His INR today is 1.46. His pro calcitonin yesterday was elevated at 2.74. His path specimen showed:Mild chronic and very focal acute cholecystitis with cholelithiasis. Blood cultures on admission were negative and his influenza B was positive. Influenza A was negative. He is currently on a regimen of Zosyn, vancomycin, and oseltamivir.    Regarding his  sleep apnea syndrome, his sleep study was performed on 2/13/17 and showed an apnea hypopnea index of 125.2, a mean event duration of 16.2 seconds, the longest event lasting 24.4 seconds. Lowest saturation during sleep was 72% and saturations were less than 90% for 40% of the diagnostic  "recording. Most of the patient's events were central apneas. He was ultimately placed on auto CPAP 10-15 cmH2O.  Subsequent compliance downloads revealed an average AHI of only 1.1. Therefore there was no need for an adaptive servo ventilation titration. He was last seen at the sleep Center on 5/9/17.    1/12 - continues on 3 lpm, doing well; minus 1 L last 24 hours; transaminases better, bilirubin better, other labs similar; no cxr today, will repeat in the AM    1/14, doing well from respiratory standpoint on room air no SOB        Past Medical History:   Stroke (CMS-HCC) 2010     TIA- no deficits   • Congestive heart failure (CMS-HCC) 2/2007     complication following heart surgery, resolved now   • Myocardial infarct 11/2006   • Anticoagulation monitoring, special range     • Breath shortness     • Hyperlipoproteinemia     • Hypertension     • Pacemaker       AICD   • Paroxysmal atrial fibrillation (CMS-HCC)     • Sleep apnea       CPAP   • Snoring        Surgical History        Past Surgical History:   Procedure Laterality Date   • AORTIC VALVE REPLACEMENT         2007   • AORTIC VALVE REPLACEMENT         mechanical   • MITRAL VALVE REPAIR       • PACEMAKER INSERTION       Social History       Social History   Substance Use Topics   • Smoking status: Never Smoker   • Smokeless tobacco: Never Used   • Alcohol use Yes   Denies tobacco or illicit drug use. Occasional alcohol use.    Allergies        Allergies   Allergen Reactions   • Lactose Diarrhea       Pt states \"I get diarrhea and upset stomach\".          Physical Exam   Constitutional: He is oriented to person, place, and time. He is cooperative. No distress.   HENT:   Mouth/Throat: No oropharyngeal exudate.   Eyes: Conjunctivae and EOM are normal.   Cardiovascular: Normal rate and regular rhythm.    1/6 DEBORAH   Pulmonary/Chest: Effort normal.    He has rales. Right > left base.  Decreased breath sound at bases   Abdominal: Soft. Bowel sounds are normal. There " is tenderness (RUQ tenderness). There is no rebound and no guarding.   Musculoskeletal: He exhibits no edema.   Neurological: He is alert and oriented to person, place, and time.   Skin: No erythema.   Nursing note and vitals reviewed.           PFSH:  No change.    Respiratory:     Pulse Oximetry: 92 %  Chest Tube Drains:          Exam: unlabored respirations, no intercostal retractions or accessory muscle use  ImagingAvailable data reviewed         Invalid input(s): [id number]    HemoDynamics:  Pulse: 69, Heart Rate (Monitored): 70  Blood Pressure: 135/75       Exam: regular rate and rhythm  Imaging: Available data reviewed        Neuro:  GCS Total Bethany Coma Score: 15       Exam: no focal deficits noted  Imaging: Available data reviewed    Fluids:  Intake/Output       01/12/18 0700 - 01/13/18 0659 01/13/18 0700 - 01/14/18 0659 01/14/18 0700 - 01/15/18 0659 (Discharged)      [id number] 9122-2085 Total 5121-9016 0804-6532 Total 3332-2223 9677-9373 Total       Intake    P.O.  800  660 1460  --  780 780  910  -- 910    P.O.  -- 780 780 910 -- 910    I.V.  650  25 675  --  550 550  --  -- --    IV Volume (Zosyn) 150 25 175 -- 300 300 -- -- --    IV Volume (Vancomycin) 500 -- 500 -- 250 250 -- -- --    Total Intake 8793 379 3106 -- 1330 1330 910 -- 910       Output    Urine  2400  1200 3600  650  1150 1800  700  -- 700    Number of Times Voided -- 2 x 2 x -- -- -- 2 x -- 2 x    Void (ml) 2400 1200 3600 650 1150 1800 700 -- 700    Stool  --  -- --  --  -- --  --  -- --    Number of Times Stooled 2 x -- 2 x -- -- -- -- -- --    Total Output 2400 1200 3600 650 1150 1800 700 -- 700       Net I/O     -950 -515 -1465 -650 180 -470 210 -- 210           Recent Labs      01/12/18   0300  01/13/18   0419  01/14/18   0507   SODIUM  135  135  137   POTASSIUM  3.4*  3.4*  3.8   CHLORIDE  98  99  102   CO2  28  28  27   BUN  14  15  19   CREATININE  0.83  0.94  0.99   CALCIUM  7.7*  7.8*  8.3*        GI/Nutrition:  Exam: abdomen is soft and non-tender  Imaging: Available data reviewed  taking PO  Liver Function  Recent Labs      18   0300  18   0419  18   0507   ALTSGPT  57*  57*   --    ASTSGOT  71*  67*   --    ALKPHOSPHAT  46  59   --    TBILIRUBIN  10.7*  6.2*   --    DBILIRUBIN  5.9*   --    --    GLUCOSE  110*  99  100*       Heme:  Recent Labs      18   0300  18   1002  18   0419  18   050   RBC  3.25*   --   3.35*   --    HEMOGLOBIN  9.9*  10.7*  10.0*   --    HEMATOCRIT  28.3*  30.3*  29.5*   --    PLATELETCT  190   --   266   --    PROTHROMBTM  22.0*   --   26.0*  29.6*   INR  1.98*   --   2.44*  2.88*       Infectious Disease:  Temp  Av.9 °C (98.4 °F)  Min: 36.7 °C (98.1 °F)  Max: 37.2 °C (98.9 °F)  Micro: reviewed  Recent Labs      18   03018   041   WBC  10.7  11.3*   NEUTSPOLYS  73.00*  69.10   LYMPHOCYTES  9.70*  11.20*   MONOCYTES  11.00  12.10   EOSINOPHILS  2.00  2.30   BASOPHILS  0.30  0.50   ASTSGOT  71*  67*   ALTSGPT  57*  57*   ALKPHOSPHAT  46  59   TBILIRUBIN  10.7*  6.2*     Current Facility-Administered Medications   Medication Dose Frequency Provider Last Rate Last Dose   • warfarin (COUMADIN) tablet 5 mg  5 mg COUMADIN-ONCE Benjie Pina M.D.       • calcium carbonate (TUMS) chewable tab 1,000 mg  1,000 mg PRN Benjie Pina M.D.   1,000 mg at 18 1130   • furosemide (LASIX) tablet 40 mg  40 mg Q DAY Benjie Pina M.D.   40 mg at 18 0800   • ipratropium-albuterol (DUONEB) nebulizer solution 3 mL  3 mL Q4H PRN (RT) Kalie Ziegler M.D.       • potassium chloride SA (Kdur) tablet 40 mEq  40 mEq DAILY Benjie Pina M.D.   40 mEq at 18 0800   • ondansetron (ZOFRAN) syringe/vial injection 4 mg  4 mg Q4HRS PRN Nataly Meneses D.O.   4 mg at 18 1812   • albuterol (PROVENTIL) 2.5mg/0.5ml nebulizer solution 2.5 mg  2.5 mg Q4H PRN (RT) Benjie Pina M.D.   2.5 mg  at 01/10/18 0731   • MD ALERT... warfarin (COUMADIN) per pharmacy protocol   pharmacy to dose Benjie Pina M.D.       • Pharmacy Consult Request ...Pain Management Review   PRN Bnejie Pina M.D.        And   • oxycodone immediate-release (ROXICODONE) tablet 2.5 mg  2.5 mg Q3HRS PRN Benjie Pina M.D.        And   • oxycodone immediate-release (ROXICODONE) tablet 5 mg  5 mg Q3HRS PRN Benjie Pina M.D.   5 mg at 01/14/18 1545    And   • HYDROmorphone (DILAUDID) injection 0.5 mg  0.5 mg Q3HRS PRN Benjie Pina M.D.   0.5 mg at 01/12/18 2334   • Respiratory Care per Protocol   Continuous RT Rodney Mcallister M.D.       • senna-docusate (PERICOLACE or SENOKOT S) 8.6-50 MG per tablet 2 Tab  2 Tab BID Kalie Ziegler M.D.   2 Tab at 01/14/18 0800    And   • polyethylene glycol/lytes (MIRALAX) PACKET 1 Packet  1 Packet QDAY PRN Kalie Ziegler M.D.   1 Packet at 01/09/18 0517    And   • magnesium hydroxide (MILK OF MAGNESIA) suspension 30 mL  30 mL QDAY PRN Kalie Ziegler M.D.        And   • bisacodyl (DULCOLAX) suppository 10 mg  10 mg QDAY PRN Kalie Ziegler M.D.       • labetalol (NORMODYNE,TRANDATE) injection 10 mg  10 mg Q4HRS PRN Kalie Ziegler M.D.       • zolpidem (AMBIEN) tablet 5 mg  5 mg HS PRN - MR X 1 Kalie Ziegler M.D.       • acetaminophen (TYLENOL) tablet 650 mg  650 mg Q6HRS PRN Kalie Ziegler M.D.   650 mg at 01/11/18 2020   • NS (BOLUS) infusion 500 mL  500 mL Once PRN Kalie K Ziegler, M.D.       • piperacillin-tazobactam (ZOSYN) 3.375 g in  mL IVPB  3.375 g Q8HRS Benjie Pina M.D. 25 mL/hr at 01/14/18 1240 3.375 g at 01/14/18 1240     Last reviewed on 1/7/2018 10:45 AM by Christopher Feliz    Quality  Measures:   EKG reviewed, Radiology images reviewed, Labs reviewed and Medications reviewed                      Problems:  Influenza B infection  Acute cholecystitis and cholelithiasis status post laparoscopic  cholecystectomy  Respiratory failure  Pneumonia  Central sleep apnea syndrome but adequately treated with auto titrating CPAP 10-15 cm water   Anemia  Electrolyte disturbance  Hyperglycemia  Abnormal liver function  Protein calorie malnutrition  History of prior CVA  History of permanent pacemaker placemen,t history of mitral valve repair,  history of aortic valve replacement        Plan:  The patient's pulmonary infiltrates are likely a consequence of the influenza B infection, possible capillary leak secondary to sepsis, and possibly bacterial superinfection. The present time the patient continues to improve on a regimen of vancomycin, Zosyn, and Tamiflu. He is currently room air         Continue his auto-titrating cpap for primarily central sleep apnea, f/u [facility] after discharge for clinical and data card review.    Can be discharged from respiratory standpoint

## 2018-01-15 NOTE — PROGRESS NOTES
Discharging pt home per MD order. Discussed discharge instructions, follow up appointments, prescriptions, and home care for Lap Jen/Flu/Pneumo. Pt voiding and ambulating without difficulty, pain controlled, tolerating diet. Family at bedside, all questions answered. Pt discharged off unit with hospital escort at 1627.

## 2018-01-15 NOTE — PROGRESS NOTES
Pt's tele summary: 100% paced.    HR 68      NC interval --P--  QRS complex 0.14  QT interval 0.38      Average HR: 68-73    This RN only responsible for interpreting tele strip at this time.      Primary nurse in charge of primary monitoring and keeping in touch w/CCT.

## 2018-01-22 ENCOUNTER — HOSPITAL ENCOUNTER (OUTPATIENT)
Dept: LAB | Facility: MEDICAL CENTER | Age: 48
End: 2018-01-22
Attending: NURSE PRACTITIONER
Payer: COMMERCIAL

## 2018-01-22 LAB
INR PPP: 1.83 (ref 0.87–1.13)
PROTHROMBIN TIME: 20.8 SEC (ref 12–14.6)

## 2018-01-22 PROCEDURE — 36415 COLL VENOUS BLD VENIPUNCTURE: CPT

## 2018-01-22 PROCEDURE — 85610 PROTHROMBIN TIME: CPT

## 2018-01-23 ENCOUNTER — ANTICOAGULATION MONITORING (OUTPATIENT)
Dept: VASCULAR LAB | Facility: MEDICAL CENTER | Age: 48
End: 2018-01-23

## 2018-01-23 ENCOUNTER — OFFICE VISIT (OUTPATIENT)
Dept: MEDICAL GROUP | Facility: PHYSICIAN GROUP | Age: 48
End: 2018-01-23
Payer: COMMERCIAL

## 2018-01-23 VITALS
WEIGHT: 233 LBS | OXYGEN SATURATION: 98 % | DIASTOLIC BLOOD PRESSURE: 78 MMHG | RESPIRATION RATE: 16 BRPM | SYSTOLIC BLOOD PRESSURE: 120 MMHG | HEART RATE: 93 BPM | HEIGHT: 67 IN | TEMPERATURE: 98.4 F | BODY MASS INDEX: 36.57 KG/M2

## 2018-01-23 DIAGNOSIS — I48.0 PAROXYSMAL ATRIAL FIBRILLATION (HCC): ICD-10-CM

## 2018-01-23 DIAGNOSIS — Z79.01 CHRONIC ANTICOAGULATION: ICD-10-CM

## 2018-01-23 DIAGNOSIS — G47.33 OSA ON CPAP: Chronic | ICD-10-CM

## 2018-01-23 DIAGNOSIS — Z95.0 PRESENCE OF PERMANENT CARDIAC PACEMAKER: ICD-10-CM

## 2018-01-23 DIAGNOSIS — Z23 NEED FOR VACCINATION: ICD-10-CM

## 2018-01-23 DIAGNOSIS — Z13.6 SCREENING FOR CARDIOVASCULAR CONDITION: ICD-10-CM

## 2018-01-23 DIAGNOSIS — F33.0 MILD EPISODE OF RECURRENT MAJOR DEPRESSIVE DISORDER (HCC): ICD-10-CM

## 2018-01-23 DIAGNOSIS — Z95.2 HISTORY OF MECHANICAL AORTIC VALVE REPLACEMENT: ICD-10-CM

## 2018-01-23 DIAGNOSIS — E66.9 OBESITY (BMI 30-39.9): ICD-10-CM

## 2018-01-23 PROCEDURE — 99214 OFFICE O/P EST MOD 30 MIN: CPT | Mod: 25 | Performed by: PHYSICIAN ASSISTANT

## 2018-01-23 PROCEDURE — 90471 IMMUNIZATION ADMIN: CPT | Performed by: PHYSICIAN ASSISTANT

## 2018-01-23 PROCEDURE — 90686 IIV4 VACC NO PRSV 0.5 ML IM: CPT | Performed by: PHYSICIAN ASSISTANT

## 2018-01-23 RX ORDER — ESCITALOPRAM OXALATE 10 MG/1
10 TABLET ORAL DAILY
Qty: 30 TAB | Refills: 1 | Status: SHIPPED | OUTPATIENT
Start: 2018-01-23 | End: 2018-02-18

## 2018-01-23 ASSESSMENT — PATIENT HEALTH QUESTIONNAIRE - PHQ9
2. FEELING DOWN, DEPRESSED, IRRITABLE, OR HOPELESS: 3
SUM OF ALL RESPONSES TO PHQ QUESTIONS 1-9: 16
SUM OF ALL RESPONSES TO PHQ9 QUESTIONS 1 AND 2: 6
CLINICAL INTERPRETATION OF PHQ2 SCORE: 0
8. MOVING OR SPEAKING SO SLOWLY THAT OTHER PEOPLE COULD HAVE NOTICED. OR THE OPPOSITE, BEING SO FIGETY OR RESTLESS THAT YOU HAVE BEEN MOVING AROUND A LOT MORE THAN USUAL: 0
6. FEELING BAD ABOUT YOURSELF - OR THAT YOU ARE A FAILURE OR HAVE LET YOURSELF OR YOUR FAMILY DOWN: 1
4. FEELING TIRED OR HAVING LITTLE ENERGY: 3
3. TROUBLE FALLING OR STAYING ASLEEP OR SLEEPING TOO MUCH: 1
7. TROUBLE CONCENTRATING ON THINGS, SUCH AS READING THE NEWSPAPER OR WATCHING TELEVISION: 2
9. THOUGHTS THAT YOU WOULD BE BETTER OFF DEAD, OR OF HURTING YOURSELF: 1
5. POOR APPETITE OR OVEREATING: 2
1. LITTLE INTEREST OR PLEASURE IN DOING THINGS: 3

## 2018-01-23 ASSESSMENT — PAIN SCALES - GENERAL: PAINLEVEL: NO PAIN

## 2018-01-23 NOTE — ASSESSMENT & PLAN NOTE
Patient states diet is healthy. I consistently needs, vegetables, and fruit. Denies having a regular exercise routine.

## 2018-01-23 NOTE — ASSESSMENT & PLAN NOTE
Patient states he was diagnosed with obstructive sleep apnea 4-5 years ago. States he wears a CPAP nightly. States he is compliant and experiences no complications or side effects. States he gets regular adjustments in fittings.

## 2018-01-23 NOTE — ASSESSMENT & PLAN NOTE
Patient states he has a mechanical aortic valve and follows up at [facility] clinic every other month. States he is prescribed warfarin 5 mg Once Daily. States he is compliant with medication and experiences no side effects or complications from medication. Denies Shortness of Breath, Chest Pain, Palpitations, Syncopal, Edema, Lower Extremity Pain. Patient states he has not followed up with cardiology in 8 months. States he cannot afford the copayment of $100. Strongly advised patient to follow up with cardiology as soon as he can for regular evaluation.

## 2018-01-23 NOTE — ASSESSMENT & PLAN NOTE
Patient states for the past 1.5 years he is feeling depressed. States he has no interest in activities and is experiencing decreased libido. States he during the days he is not working. States he's tried counseling in the past but did not find it beneficial. States he has tried Zoloft with felt success. Patient did not homicidal or suicidal ideation. Patient is inquiring about antidepressant medication.    Depression Screening    Little interest or pleasure in doing things?  0 - not at all  Feeling down, depressed , or hopeless? 0 - not at all  Patient Health Questionnaire Score: 0    If depressive symptoms identified deferred to follow up visit unless specifically addressed in assesment and plan.      Interpretation of PHQ-9 Total Score   Score Severity   1-4 Minimal Depression   5-9 Mild Depression   10-14 Moderate Depression   15-19 Moderately Severe Depression   20-27 Severe Depression

## 2018-01-23 NOTE — LETTER
January 23, 2018    To Whom It May Concern:         This is confirmation that Brandon [place] attended his scheduled appointment with Ruby Kendrick P.A.-C. on 1/23/18. Brandon may return to work on 01/24/18.         If you have any questions please do not hesitate to call me at the phone number listed below.    Sincerely,          Ruby Kendrick P.A.-C.  [phone]

## 2018-01-23 NOTE — PROGRESS NOTES
Anticoagulation Summary  As of 1/23/2018    INR goal:   2.5-3.5   TTR:   51.7 % (2.3 y)   Today's INR:   1.83! (1/22/2018)   Maintenance plan:   7.5 mg (5 mg x 1.5) on Wed, Fri; 5 mg (5 mg x 1) all other days   Weekly total:   40 mg   Plan last modified:   Figueroa Mata, Matteo (1/23/2018)   Next INR check:   1/30/2018   Target end date:   Indefinite    Indications    Paroxysmal atrial fibrillation (CMS-[facility]) [I48.0]  Chronic anticoagulation [Z79.01]  History of mechanical aortic valve replacement [Z95.2]             Anticoagulation Episode Summary     INR check location[facility]   [facility]    Preferred lab:       Send INR reminders to:       Comments:   [facility]      Anticoagulation Care Providers     Provider Role Specialty Phone number    Brandon Chawla M.D. Referring Cardiology [phone]    [facility] Responsible  [phone]    Rashi Shook, PharmD Responsible          Anticoagulation Patient Findings        Spoke to patient on the phone.   INR  sub-therapeutic.   Denies signs/symptoms of bleeding and/or thrombosis.   Denies changes to diet or medications.   Follow up appointment in 2 week(s).    Increase weekly warfarin dose as noted      Figueroa Mata, PharmD

## 2018-01-24 ENCOUNTER — HOSPITAL ENCOUNTER (OUTPATIENT)
Dept: LAB | Facility: MEDICAL CENTER | Age: 48
End: 2018-01-24
Attending: PHYSICIAN ASSISTANT
Payer: COMMERCIAL

## 2018-01-24 DIAGNOSIS — Z13.6 SCREENING FOR CARDIOVASCULAR CONDITION: ICD-10-CM

## 2018-01-24 PROCEDURE — 36415 COLL VENOUS BLD VENIPUNCTURE: CPT

## 2018-01-24 PROCEDURE — 80061 LIPID PANEL: CPT

## 2018-01-25 LAB
CHOLEST SERPL-MCNC: 250 MG/DL (ref 100–199)
HDLC SERPL-MCNC: 26 MG/DL
LDLC SERPL CALC-MCNC: 186 MG/DL
TRIGL SERPL-MCNC: 192 MG/DL (ref 0–149)

## 2018-01-27 NOTE — PROGRESS NOTES
Chief Complaint   Patient presents with   • Establish Care     Work Release       HISTORY OF PRESENT ILLNESS: Brandon Winters is an established 47 y.o. male here to discuss the evaluation and management of:    History of mechanical aortic valve replacement  Patient states he has a mechanical aortic valve and follows up at [facility] every other month. States he is prescribed warfarin 5 mg Once Daily. States he is compliant with medication and experiences no side effects or complications from medication. Denies Shortness of Breath, Chest Pain, Palpitations, Syncopal, Edema, Lower Extremity Pain. Patient states he has not followed up with cardiology in 8 months. States he cannot afford the copayment of $100. Strongly advised patient to follow up with cardiology as soon as he can for regular evaluation.    TRIP on CPAP  Patient states he was diagnosed with obstructive sleep apnea 4-5 years ago. States he wears a CPAP nightly. States he is compliant and experiences no complications or side effects. States he gets regular adjustments in fittings.    Obesity (BMI 30-39.9)  Patient states diet is healthy. I consistently needs, vegetables, and fruit. Denies having a regular exercise routine.    Mild episode of recurrent major depressive disorder (CMS-HCC)  Patient states for the past 1.5 years he is feeling depressed. States he has no interest in activities and is experiencing decreased libido. States he during the days he is not working. States he's tried counseling in the past but did not find it beneficial. States he has tried Zoloft with felt success. Patient did not homicidal or suicidal ideation. Patient is inquiring about antidepressant medication. Denies weight gain, chest palpitations, shortness of breath, diaphoresis, hair loss, hoarseness of voice, diarrhea, constipation, temperature intolerance.        Patient Active Problem List    Diagnosis Date Noted   • Non-rheumatic mitral valve stenosis  10/11/2016     Priority: Medium   • Paroxysmal atrial fibrillation (CMS-HCC) 2015     Priority: Medium   • History of mechanical aortic valve replacement      Priority: Medium   • Hyperlipoproteinemia      Priority: Medium   • Presence of permanent cardiac pacemaker      Priority: Medium   • Chronic anticoagulation      Priority: Medium   • History of TIA (transient ischemic attack)      Priority: Medium   • TRIP on CPAP      Priority: Low   • Hypertension      Priority: Low   • Obesity (BMI 30-39.9) 2018   • Mild episode of recurrent major depressive disorder (CMS-HCC) 2018   • Chronic fatigue 01/10/2017   • Obesity (BMI 35.0-39.9 without comorbidity) 10/14/2016   • Dyspnea on exertion 10/13/2016   • Ventral hernia without obstruction or gangrene 10/13/2016       Allergies:Lactose    Current Outpatient Prescriptions   Medication Sig Dispense Refill   • escitalopram (LEXAPRO) 10 MG Tab Take 1 Tab by mouth every day. 30 Tab 1   • warfarin (COUMADIN) 5 MG Tab Take one tablet by mouth once daily as instructed by [facility] anticoagulation services 90 Tab 1     No current facility-administered medications for this visit.        Social History   Substance Use Topics   • Smoking status: Never Smoker   • Smokeless tobacco: Never Used   • Alcohol use Yes      Comment: occ.        Family Status   Relation Status   • Father Alive   • Mother Alive   • Sister Alive   • Maternal Grandmother    • Maternal Grandfather    • Paternal Grandmother    • Paternal Grandfather    • Paternal Uncle    • Daughter Alive     Family History   Problem Relation Age of Onset   • Other Father      Hernia   • Heart Disease Father      Ascending Aortic Aneurysm Repair   • No Known Problems Mother    • No Known Problems Sister    • Heart Disease Maternal Grandmother      Open Heart Surgery   • Dementia Maternal Grandmother    • Heart Disease Paternal Grandmother      Open Heart Surgery (CABG)   •  "Alzheimer's Disease Paternal Grandmother    • Heart Disease Paternal Grandfather      ?Aortic Aneurysm   • Cancer Paternal Uncle      Prostate Cancer   • No Known Problems Daughter        ROS:  Review of Systems   Constitutional: Negative for fever, chills, weight loss and malaise/fatigue.   HENT: Negative for ear pain, nosebleeds, congestion, sore throat and neck pain.    Eyes: Negative for blurred vision.   Respiratory: Negative for cough, sputum production, shortness of breath and wheezing.    Cardiovascular: Negative for chest pain, palpitations, orthopnea and leg swelling.   Gastrointestinal: Negative for heartburn, nausea, vomiting and abdominal pain.   Genitourinary: Negative for dysuria, urgency and frequency.   Musculoskeletal: Negative for myalgias, back pain and joint pain.   Skin: Negative for rash and itching.   Neurological: Negative for dizziness, tingling, tremors, sensory change, focal weakness and headaches.   Endo/Heme/Allergies: Does not bruise/bleed easily.   Psychiatric/Behavioral: Negative for depression, suicidal ideas and memory loss.  The patient is not nervous/anxious and does not have insomnia.    All other systems reviewed and are negative except as in HPI.    Exam: Blood pressure 120/78, pulse 93, temperature 36.9 °C (98.4 °F), resp. rate 16, height 1.704 m (5' 7.09\"), weight 105.7 kg (233 lb), SpO2 98 %. Body mass index is 36.4 kg/m².  General: Normal appearing. No distress.  HEENT: Normocephalic. Eyes conjunctiva clear lids without ptosis, pupils equal and reactive to light accommodation, ears normal shape and contour, canals are clear bilaterally, tympanic membranes are benign, nasal mucosa benign, oropharynx is without erythema, edema or exudates.   Neck: Supple without JVD or bruit. Thyroid is not enlarged.  Pulmonary: Clear to ausculation.  Normal effort. No rales, ronchi, or wheezing.  Cardiovascular: Reveals mechanical sounds with no murmur. Carotid and radial pulses are intact " and equal bilaterally.  Abdomen: Soft, nontender, nondistended. Normal bowel sounds. Liver and spleen are not palpable  Neurologic: Grossly nonfocal.  Cranial nerves are normal. LE DTR's normal and symmetric.  Lymph: No cervical, supraclavicular or axillary lymph nodes are palpable  Skin: Warm and dry.  No rashes or suspicious skin lesions.  Musculoskeletal: Normal gait. No extremity cyanosis, clubbing, or edema.  Psych: Normal mood and affect. Alert and oriented x3. Judgment and insight is normal.    Medical decision-making and discussion:  1. Presence of permanent cardiac pacemaker  2. History of mechanical aortic valve replacement  3. Chronic anticoagulation  Continue to follow up with cardiology as indicated. Continue current medication regimen. Continue to follow up with [facility] regularly.  ED precautions: seek emergency evaluation for symptoms including but not limited to : crushing chest pain, chest pain associated with difficulty breathing, nausea, or sweats, heart rate irregular or too fast to count, shortness of breath.     4. TRIP on CPAP  Chronic stable condition. Continue to follow up for regular feedings and adjustments.    5. Obesity (BMI 30-39.9)  - Encouraged diet high in fruits, vegetables, and fiber. And a diet low in salt, refined carbohydrates, cholesterol, saturated fat, and trans fatty acids.    - Encouraged  a minimum of 30 minutes of moderate intensity aerobic exercise (eg, brisk walking) is recommended on five days each week. Or 20 minutes of vigorous-intensity aerobic exercise (eg, jogging) on three days each week.   Lipid profile has been ordered to further evaluate patient for cardiovascular conditions. Patient will be contacted with results. Pt. will follow-up in one month to review results.    - Patient identified as having weight management issue.  Appropriate orders and counseling given.    6. Mild episode of recurrent major depressive disorder (CMS-HCC)  During today's  appointment patient has been prescribed Lexapro 10 mg tab and tablet by mouth every day. Discussed common side effects and adverse reactions of medication with patient. Advised patient if he develops suicidal or homicidal ideation to seek immediate emergency care. Patient will follow-up in one month for med eval.  Denies any suicidal or homicidal ideation. Emphasized importance of healthy diet and exercise. Discussed that should the patient have any symptoms they should call suicide prevention hotline or report to the emergency room immediately.    - escitalopram (LEXAPRO) 10 MG Tab; Take 1 Tab by mouth every day.  Dispense: 30 Tab; Refill: 1    7. Screening for cardiovascular condition  Lipid profile has been ordered to further evaluate patient for cardiovascular conditions. Patient will be contacted with results. Pt. will follow-up in one month to review results.    - LIPID PROFILE; Future    8. Need for vaccination  A flu vaccination was administered to patient without complication. A VIS form was provided to patient.     - INFLUENZA VACCINE QUAD INJ >3Y(PF)      Please note that this dictation was created using voice recognition software. I have made every reasonable attempt to correct obvious errors, but I expect that there are errors of grammar and possibly content that I did not discover before finalizing the note.    Assessment/Plan:  1. Presence of permanent cardiac pacemaker     2. History of mechanical aortic valve replacement     3. Chronic anticoagulation     4. TRIP on CPAP     5. Obesity (BMI 30-39.9)  LIPID PROFILE    Patient identified as having weight management issue.  Appropriate orders and counseling given.   6. Mild episode of recurrent major depressive disorder (CMS-HCC)  escitalopram (LEXAPRO) 10 MG Tab   7. Screening for cardiovascular condition  LIPID PROFILE   8. Need for vaccination  INFLUENZA VACCINE QUAD INJ >3Y(PF)       Return in about 1 month (around 2/23/2018).

## 2018-01-29 ENCOUNTER — TELEPHONE (OUTPATIENT)
Dept: MEDICAL GROUP | Facility: PHYSICIAN GROUP | Age: 48
End: 2018-01-29

## 2018-01-29 PROBLEM — Z90.49 HISTORY OF LAPAROSCOPIC CHOLECYSTECTOMY: Status: ACTIVE | Noted: 2018-01-29

## 2018-01-29 NOTE — TELEPHONE ENCOUNTER
----- Message from Ruby Kendrick P.A.-C. sent at 1/26/2018  7:59 PM PST -----  Please call patient. I have reviewed labs and they are abnormal.   Total Cholesterol, Triglycerides, HDL (good cholesterol), LDL (bad cholesterol) are abnormal.  Statin medication is not indicated. Will discuss in more detail during follow-up appointment on 2/28/18.  - Encourage diet high in fruits, vegetables, and fiber. And a diet low in salt, refined carbohydrates, cholesterol, saturated fat, and trans fatty acids.    - Encourage  a minimum of 30 minutes of moderate intensity aerobic exercise (eg, brisk walking) is recommended on five days each week    Thank you,    Marta GUERRERO

## 2018-02-13 ENCOUNTER — TELEPHONE (OUTPATIENT)
Dept: VASCULAR LAB | Facility: MEDICAL CENTER | Age: 48
End: 2018-02-13

## 2018-02-18 ENCOUNTER — APPOINTMENT (OUTPATIENT)
Dept: RADIOLOGY | Facility: MEDICAL CENTER | Age: 48
End: 2018-02-18
Attending: EMERGENCY MEDICINE
Payer: COMMERCIAL

## 2018-02-18 ENCOUNTER — HOSPITAL ENCOUNTER (EMERGENCY)
Facility: MEDICAL CENTER | Age: 48
End: 2018-02-18
Attending: EMERGENCY MEDICINE
Payer: COMMERCIAL

## 2018-02-18 ENCOUNTER — HOSPITAL ENCOUNTER (EMERGENCY)
Facility: MEDICAL CENTER | Age: 48
End: 2018-02-18
Payer: COMMERCIAL

## 2018-02-18 VITALS
OXYGEN SATURATION: 94 % | SYSTOLIC BLOOD PRESSURE: 141 MMHG | DIASTOLIC BLOOD PRESSURE: 93 MMHG | BODY MASS INDEX: 35.15 KG/M2 | HEIGHT: 68 IN | RESPIRATION RATE: 18 BRPM | TEMPERATURE: 98.1 F | WEIGHT: 231.92 LBS | HEART RATE: 60 BPM

## 2018-02-18 VITALS
DIASTOLIC BLOOD PRESSURE: 86 MMHG | BODY MASS INDEX: 35.38 KG/M2 | TEMPERATURE: 98.5 F | WEIGHT: 233.47 LBS | SYSTOLIC BLOOD PRESSURE: 142 MMHG | HEART RATE: 84 BPM | RESPIRATION RATE: 18 BRPM | HEIGHT: 68 IN | OXYGEN SATURATION: 100 %

## 2018-02-18 DIAGNOSIS — R55 NEAR SYNCOPE: ICD-10-CM

## 2018-02-18 LAB
ALBUMIN SERPL BCP-MCNC: 4.2 G/DL (ref 3.2–4.9)
ALBUMIN/GLOB SERPL: 1.5 G/DL
ALP SERPL-CCNC: 64 U/L (ref 30–99)
ALT SERPL-CCNC: 25 U/L (ref 2–50)
ANION GAP SERPL CALC-SCNC: 5 MMOL/L (ref 0–11.9)
APTT PPP: 33.5 SEC (ref 24.7–36)
AST SERPL-CCNC: 23 U/L (ref 12–45)
BASOPHILS # BLD AUTO: 0.4 % (ref 0–1.8)
BASOPHILS # BLD: 0.03 K/UL (ref 0–0.12)
BILIRUB SERPL-MCNC: 1.4 MG/DL (ref 0.1–1.5)
BUN SERPL-MCNC: 11 MG/DL (ref 8–22)
CALCIUM SERPL-MCNC: 9.1 MG/DL (ref 8.4–10.2)
CHLORIDE SERPL-SCNC: 106 MMOL/L (ref 96–112)
CO2 SERPL-SCNC: 26 MMOL/L (ref 20–33)
CREAT SERPL-MCNC: 0.94 MG/DL (ref 0.5–1.4)
EKG IMPRESSION: NORMAL
EKG IMPRESSION: NORMAL
EOSINOPHIL # BLD AUTO: 0.18 K/UL (ref 0–0.51)
EOSINOPHIL NFR BLD: 2.6 % (ref 0–6.9)
ERYTHROCYTE [DISTWIDTH] IN BLOOD BY AUTOMATED COUNT: 41.2 FL (ref 35.9–50)
GLOBULIN SER CALC-MCNC: 2.8 G/DL (ref 1.9–3.5)
GLUCOSE SERPL-MCNC: 138 MG/DL (ref 65–99)
HCT VFR BLD AUTO: 40 % (ref 42–52)
HGB BLD-MCNC: 13.7 G/DL (ref 14–18)
IMM GRANULOCYTES # BLD AUTO: 0.08 K/UL (ref 0–0.11)
IMM GRANULOCYTES NFR BLD AUTO: 1.2 % (ref 0–0.9)
INR PPP: 1.91 (ref 0.87–1.13)
LYMPHOCYTES # BLD AUTO: 1.23 K/UL (ref 1–4.8)
LYMPHOCYTES NFR BLD: 17.9 % (ref 22–41)
MCH RBC QN AUTO: 29.3 PG (ref 27–33)
MCHC RBC AUTO-ENTMCNC: 34.3 G/DL (ref 33.7–35.3)
MCV RBC AUTO: 85.7 FL (ref 81.4–97.8)
MONOCYTES # BLD AUTO: 0.54 K/UL (ref 0–0.85)
MONOCYTES NFR BLD AUTO: 7.9 % (ref 0–13.4)
NEUTROPHILS # BLD AUTO: 4.8 K/UL (ref 1.82–7.42)
NEUTROPHILS NFR BLD: 70 % (ref 44–72)
NRBC # BLD AUTO: 0 K/UL
NRBC BLD-RTO: 0 /100 WBC
PLATELET # BLD AUTO: 202 K/UL (ref 164–446)
PMV BLD AUTO: 9 FL (ref 9–12.9)
POTASSIUM SERPL-SCNC: 3.8 MMOL/L (ref 3.6–5.5)
PROT SERPL-MCNC: 7 G/DL (ref 6–8.2)
PROTHROMBIN TIME: 21.4 SEC (ref 12–14.6)
RBC # BLD AUTO: 4.67 M/UL (ref 4.7–6.1)
SODIUM SERPL-SCNC: 137 MMOL/L (ref 135–145)
TROPONIN I SERPL-MCNC: <0.02 NG/ML (ref 0–0.04)
WBC # BLD AUTO: 6.9 K/UL (ref 4.8–10.8)

## 2018-02-18 PROCEDURE — 85025 COMPLETE CBC W/AUTO DIFF WBC: CPT

## 2018-02-18 PROCEDURE — 85610 PROTHROMBIN TIME: CPT

## 2018-02-18 PROCEDURE — 71045 X-RAY EXAM CHEST 1 VIEW: CPT

## 2018-02-18 PROCEDURE — 36415 COLL VENOUS BLD VENIPUNCTURE: CPT

## 2018-02-18 PROCEDURE — 93005 ELECTROCARDIOGRAM TRACING: CPT

## 2018-02-18 PROCEDURE — 80053 COMPREHEN METABOLIC PANEL: CPT

## 2018-02-18 PROCEDURE — 85730 THROMBOPLASTIN TIME PARTIAL: CPT

## 2018-02-18 PROCEDURE — 99284 EMERGENCY DEPT VISIT MOD MDM: CPT

## 2018-02-18 PROCEDURE — 84484 ASSAY OF TROPONIN QUANT: CPT

## 2018-02-18 PROCEDURE — 302449 STATCHG TRIAGE ONLY (STATISTIC)

## 2018-02-18 RX ORDER — ESCITALOPRAM OXALATE 10 MG/1
10 TABLET ORAL
Status: SHIPPED | COMMUNITY
End: 2018-06-06

## 2018-02-18 RX ORDER — WARFARIN SODIUM 5 MG/1
5 TABLET ORAL
COMMUNITY

## 2018-02-18 RX ORDER — NAPROXEN SODIUM 220 MG
440 TABLET ORAL PRN
COMMUNITY

## 2018-02-18 NOTE — ED TRIAGE NOTES
.  Chief Complaint   Patient presents with   • Near Syncopal   • Pacemaker Check/Dysfunction     st sudeep     Reports episode of near syncope and dizziness this am. Pt feels that he pacer stopped working. To triage room for ekg

## 2018-02-18 NOTE — ED NOTES
"Chief Complaint   Patient presents with   • Numbness     Per pt, \"legs went numb from my feet all the way up my body\"   • Irregular Heart Beat     felt like \"heart beat slowed down\"  \"I could hear it start up again\"       /93   Pulse 60   Temp 36.7 °C (98.1 °F)   Resp 16   Ht 1.727 m (5' 8\")   Wt 105.2 kg (231 lb 14.8 oz)   SpO2 96%   BMI 35.26 kg/m²     Pt denies c/o CP, abd pain, or back pain.  Pt concerned PM may have malfunctioned.    "

## 2018-02-18 NOTE — ED PROVIDER NOTES
"ED Provider Note    CHIEF COMPLAINT  Chief Complaint   Patient presents with   • Numbness     Per pt, \"legs went numb from my feet all the way up my body\"   • Irregular Heart Beat     felt like \"heart beat slowed down\"  \"I could hear it start up again\"       HPI  Brandon Winters is a 47 y.o. male who presents after a near syncopal episode. He is on the toilet trying to move his bowels not straining when he felt his legs go numb all abdomen. He got lightheaded he hears his mechanical valve and hurt it \"start to go out\" he thought he was going to pass out. He did not totally lose consciousness. And then he started to hear his valve and heart rate coming back. He denies any chest pain he does report some \"heart soreness\" no shortness of breath. His symptoms have since gone away. He has never had anything like this before. He recently has gallbladder out he is on anticoagulation for mechanical valve.        REVIEW OF SYSTEMS  positive for near-syncope numbness, negative for fevers chills. All other systems are negative.     PAST MEDICAL HISTORY   has a past medical history of Anticoagulation monitoring, special range; Breath shortness; Congestive heart failure (CMS-[facility]) (2/2007); Hyperlipoproteinemia; Hypertension; Myocardial infarct (11/2006); Pacemaker; Paroxysmal atrial fibrillation (CMS-HCC); Sleep apnea; Snoring; and Stroke (CMS-[facility]) (2010).    SOCIAL HISTORY  Social History     Social History Main Topics   • Smoking status: Never Smoker   • Smokeless tobacco: Never Used   • Alcohol use Yes   • Drug use: No   • Sexual activity: Yes     Partners: Female     Birth control/ protection: Surgical      Comment: .        SURGICAL HISTORY   has a past surgical history that includes aortic valve replacement; aortic valve replacement; pacemaker insertion; mitral valve repair; madhavi by laparoscopy (N/A, 1/9/2018); other (2007); and dental extraction(s) (2005).    CURRENT MEDICATIONS  Home Medications     " "Reviewed by Christopher Feliz (Pharmacy Tech) on 02/18/18 at 1510  Med List Status: Complete   Medication Last Dose Status   escitalopram (LEXAPRO) 10 MG Tab 2/17/2018 Active   naproxen (ALEVE) 220 MG tablet > 3 days Active   warfarin (COUMADIN) 5 MG Tab 2/17/2018 Active                ALLERGIES  Allergies   Allergen Reactions   • Lactose Diarrhea     Pt states \"I get diarrhea and upset stomach\".         PHYSICAL EXAM  VITAL SIGNS: /93   Pulse 60   Temp 36.7 °C (98.1 °F)   Resp 16   Ht 1.727 m (5' 8\")   Wt 105.2 kg (231 lb 14.8 oz)   SpO2 93%   BMI 35.26 kg/m² .  Constitutional: Alert in no apparent distress.  HENT: No signs of trauma, Bilateral external ears normal, Nose normal.   Eyes: Pupils are equal and reactive, Conjunctiva normal, Non-icteric.   Neck: Normal range of motion, No tenderness, Supple, No stridor.   Cardiovascular: Mechanical valve click no extra heart sounds no murmurs  Thorax & Lungs: Normal breath sounds, No respiratory distress, No wheezing, No chest tenderness.   Abdomen: Bowel sounds normal, Soft, No tenderness, No masses, No peritoneal signs.  Skin: Warm, Dry, No erythema, No rash.   Musculoskeletal:  no major deformities noted.   Neurologic: Alert,  No focal deficits noted.   Psychiatric: Affect normal, Judgment normal, Mood normal.       DIAGNOSTIC STUDIES / PROCEDURES      EKG  12-lead EKG interpreted by myself.  AV dual paced rhythm.   Impression: no further interpretation can be performed secondary to pacing.     LABS  Labs Reviewed   CBC WITH DIFFERENTIAL - Abnormal; Notable for the following:        Result Value    RBC 4.67 (*)     Hemoglobin 13.7 (*)     Hematocrit 40.0 (*)     Lymphocytes 17.90 (*)     Immature Granulocytes 1.20 (*)     All other components within normal limits    Narrative:     Indicate which anticoagulants the patient is on:->COUMADIN   COMP METABOLIC PANEL - Abnormal; Notable for the following:     Glucose 138 (*)     All other components " within normal limits    Narrative:     Indicate which anticoagulants the patient is on:->COUMADIN   PROTHROMBIN TIME - Abnormal; Notable for the following:     PT 21.4 (*)     INR 1.91 (*)     All other components within normal limits    Narrative:     Indicate which anticoagulants the patient is on:->COUMADIN   APTT    Narrative:     Indicate which anticoagulants the patient is on:->COUMADIN   TROPONIN    Narrative:     Indicate which anticoagulants the patient is on:->COUMADIN   ESTIMATED GFR    Narrative:     Indicate which anticoagulants the patient is on:->COUMADIN       RADIOLOGY  DX-CHEST-PORTABLE (1 VIEW)   Final Result         1.  Cardiomegaly and cardiac pacemaker again noted.      2.  Pulmonary opacifications have resolved. No new infiltrates or consolidations are noted.              COURSE & MEDICAL DECISION MAKING  Pertinent Labs & Imaging studies reviewed. (See chart for details)  This is a 47-year-old male who presents with this episode of numbness and near syncope. It is unclear why it's happened. Differentials include irregular heartbeat, transient hypotension, heart failure.    Labs show a normal troponin and minimally low INR 1.9 CMP and CBC are essentially normal chest x-ray does not show any evidence of opacifications.    5:03 PM  I spoke with Dr. Omer, cardiology regarding the patient. We are trying to interrogate pacemaker. He recommended that we do this and then he will have the patient seen as an outpatient early next week but he did not feel otherwise that there is any more investigation that required admission at this time. Patient is otherwise well-appearing I do think he can be discharged as long as the pacemaker interrogation is performed and unremarkable.     The patient will return for new or worsening symptoms and is stable at the time of discharge. Patient was given return precautions. Patient and/or family member verbalizes understanding and will  comply.    DISPOSITION:  Patient will be discharged home in stable condition.    FOLLOW UP:  No follow-up provider specified.    OUTPATIENT MEDICATIONS:  New Prescriptions    No medications on file         FINAL IMPRESSION  1. Near syncope        2.   3.    This dictation has been creating using voice recognition software. The accuracy of the dictation is limited the abilities of the software.  I expect there may be some errors of grammar and possibly content. I made every attempt to manually correct the errors within my dictation. However errors related to this voice recognition software may still exist and should be interpreted within the appropriate context.      The note accurately reflects work and decisions made by me.  Nataly Fulton  2/18/2018  5:10 PM

## 2018-02-18 NOTE — ED NOTES
Pt in no apparent distress, amb to room. Paced rhythm on moniter. erp at bedside. Plan of are discussed. Saline lock with blood draw

## 2018-02-19 NOTE — ED NOTES
Discharge information provided. Pt verbalized understanding of discharge instructions to follow up with PCP and Cardiology and to return to ER if condition worsens. Pt ambulated out of ER in a steady gait, no additional questions or concerns. NO new medications.

## 2018-02-19 NOTE — DISCHARGE INSTRUCTIONS
Near-Syncope  Near-syncope (commonly known as near fainting) is sudden weakness, dizziness, or feeling like you might pass out. During an episode of near-syncope, you may also develop pale skin, have tunnel vision, or feel sick to your stomach (nauseous). Near-syncope may occur when getting up after sitting or while standing for a long time. It is caused by a sudden decrease in blood flow to the brain. This decrease can result from various causes or triggers, most of which are not serious. However, because near-syncope can sometimes be a sign of something serious, a medical evaluation is required. The specific cause is often not determined.  HOME CARE INSTRUCTIONS   Monitor your condition for any changes. The following actions may help to alleviate any discomfort you are experiencing:  · Have someone stay with you until you feel stable.  · Lie down right away and prop your feet up if you start feeling like you might faint. Breathe deeply and steadily. Wait until all the symptoms have passed. Most of these episodes last only a few minutes. You may feel tired for several hours.    · Drink enough fluids to keep your urine clear or pale yellow.    · If you are taking blood pressure or heart medicine, get up slowly when seated or lying down. Take several minutes to sit and then stand. This can reduce dizziness.  · Follow up with your health care provider as directed.   SEEK IMMEDIATE MEDICAL CARE IF:   · You have a severe headache.    · You have unusual pain in the chest, abdomen, or back.    · You are bleeding from the mouth or rectum, or you have black or tarry stool.    · You have an irregular or very fast heartbeat.    · You have repeated fainting or have seizure-like jerking during an episode.    · You faint when sitting or lying down.    · You have confusion.    · You have difficulty walking.    · You have severe weakness.    · You have vision problems.    MAKE SURE YOU:   · Understand these instructions.  · Will  watch your condition.  · Will get help right away if you are not doing well or get worse.     This information is not intended to replace advice given to you by your health care provider. Make sure you discuss any questions you have with your health care provider.     Document Released: 12/18/2006 Document Revised: 12/23/2014 Document Reviewed: 05/23/2014  [employer] Interactive Patient Education ©2016 [employer]

## 2018-02-20 ENCOUNTER — PATIENT OUTREACH (OUTPATIENT)
Dept: HEALTH INFORMATION MANAGEMENT | Facility: OTHER | Age: 48
End: 2018-02-20

## 2018-02-22 ENCOUNTER — OFFICE VISIT (OUTPATIENT)
Dept: CARDIOLOGY | Facility: MEDICAL CENTER | Age: 48
End: 2018-02-22
Payer: COMMERCIAL

## 2018-02-22 VITALS
SYSTOLIC BLOOD PRESSURE: 142 MMHG | HEART RATE: 70 BPM | DIASTOLIC BLOOD PRESSURE: 80 MMHG | OXYGEN SATURATION: 95 % | BODY MASS INDEX: 35.77 KG/M2 | WEIGHT: 236 LBS | HEIGHT: 68 IN

## 2018-02-22 DIAGNOSIS — Z95.0 PRESENCE OF PERMANENT CARDIAC PACEMAKER: ICD-10-CM

## 2018-02-22 DIAGNOSIS — I34.2 NON-RHEUMATIC MITRAL VALVE STENOSIS: ICD-10-CM

## 2018-02-22 DIAGNOSIS — Z95.2 HISTORY OF MECHANICAL AORTIC VALVE REPLACEMENT: ICD-10-CM

## 2018-02-22 DIAGNOSIS — Z79.01 CHRONIC ANTICOAGULATION: ICD-10-CM

## 2018-02-22 DIAGNOSIS — I48.0 PAROXYSMAL ATRIAL FIBRILLATION (HCC): ICD-10-CM

## 2018-02-22 PROCEDURE — 99214 OFFICE O/P EST MOD 30 MIN: CPT | Performed by: NURSE PRACTITIONER

## 2018-02-22 RX ORDER — ROSUVASTATIN CALCIUM 10 MG/1
10 TABLET, COATED ORAL EVERY EVENING
Qty: 30 TAB | Refills: 11 | Status: SHIPPED | OUTPATIENT
Start: 2018-02-22

## 2018-02-22 ASSESSMENT — ENCOUNTER SYMPTOMS
NAUSEA: 0
VOMITING: 0
COUGH: 0
PALPITATIONS: 0
SHORTNESS OF BREATH: 0
DIZZINESS: 1
FEVER: 0
ORTHOPNEA: 0
WHEEZING: 0
BLURRED VISION: 1
CHILLS: 1

## 2018-02-22 NOTE — PROGRESS NOTES
"Subjective:   Brandon Winters is a 47 y.o. male who has a history of AVR with MAZE 2007, PPM for heart block, PAF, HTN, TIA, and TRIP on CPAP.    The pt presents today after being seen in the [facility] ED for a near syncopal episode. Pt states he was having a bowel movement and was not straining when he felt very lightheaded and felt numbness from his feet to his abdomen. He has a mechanical valve, which he is very aware of and felt it \"started to go out.\" He did not pass out completely per his report. PPM was interrogated with no significant episodes. The settings were changed for pt comfort. Pt has had no further presyncopal episodes.      During hospitalization, TTE was performed indicating elevation gradients. This will need to be followed.    Pt reports having an issue with his eyes crossing.  He is wondering if this is associated with his near syncopal episode. Recommend pt see ophthalmologist.     Past Medical History:   Diagnosis Date   • Anticoagulation monitoring, special range    • Breath shortness    • Congestive heart failure (CMS-HCC) 2/2007    complication following heart surgery, resolved now   • Hyperlipoproteinemia    • Hypertension    • Myocardial infarct 11/2006   • Pacemaker     AICD   • Paroxysmal atrial fibrillation (CMS-[facility])    • Sleep apnea     CPAP   • Snoring    • Stroke (CMS-[facility]) 2010    TIA- no deficits     Past Surgical History:   Procedure Laterality Date   • JOSE BY LAPAROSCOPY N/A 1/9/2018    Procedure: JOSE BY LAPAROSCOPY WITH GRAMS  ;  Surgeon: Rodney Mcallister M.D.;  Location: SURGERY [facility];  Service: General   • OTHER  2007    ascending aortic anuerysm repair   • DENTAL EXTRACTION(S)  2005   • AORTIC VALVE REPLACEMENT      2007   • AORTIC VALVE REPLACEMENT      mechanical   • MITRAL VALVE REPAIR     • PACEMAKER INSERTION       Family History   Problem Relation Age of Onset   • Other Father      Hernia   • Heart Disease Father      Ascending Aortic Aneurysm " "Repair   • No Known Problems Mother    • No Known Problems Sister    • Heart Disease Maternal Grandmother      Open Heart Surgery   • Dementia Maternal Grandmother    • Heart Disease Paternal Grandmother      Open Heart Surgery (CABG)   • Alzheimer's Disease Paternal Grandmother    • Heart Disease Paternal Grandfather      ?Aortic Aneurysm   • Cancer Paternal Uncle      Prostate Cancer   • No Known Problems Daughter      History   Smoking Status   • Never Smoker   Smokeless Tobacco   • Never Used     Allergies   Allergen Reactions   • Lactose Diarrhea     Pt states \"I get diarrhea and upset stomach\".       Outpatient Encounter Prescriptions as of 2/22/2018   Medication Sig Dispense Refill   • rosuvastatin (CRESTOR) 10 MG Tab Take 1 Tab by mouth every evening. 30 Tab 11   • escitalopram (LEXAPRO) 10 MG Tab Take 10 mg by mouth every bedtime.     • warfarin (COUMADIN) 5 MG Tab Take 5 mg by mouth every bedtime.     • naproxen (ALEVE) 220 MG tablet Take 440 mg by mouth PRN.       No facility-administered encounter medications on file as of 2/22/2018.      Review of Systems   Constitutional: Positive for chills. Negative for fever.   HENT: Negative for hearing loss.    Eyes: Positive for blurred vision.   Respiratory: Negative for cough, shortness of breath and wheezing.    Cardiovascular: Positive for leg swelling. Negative for chest pain, palpitations and orthopnea.   Gastrointestinal: Negative for nausea and vomiting.   Neurological: Positive for dizziness.   All other systems reviewed and are negative.       Objective:   /80   Pulse 70   Ht 1.727 m (5' 8\")   Wt 107 kg (236 lb)   SpO2 95%   BMI 35.88 kg/m²     Physical Exam   Constitutional: He is oriented to person, place, and time. He appears well-developed and well-nourished.   HENT:   Head: Normocephalic and atraumatic.   Eyes: EOM are normal.   Neck: Normal range of motion. Neck supple.   Cardiovascular: Normal rate, regular rhythm and normal heart " sounds.    Pulmonary/Chest: Effort normal and breath sounds normal.   Abdominal: Soft. Bowel sounds are normal.   Musculoskeletal: Normal range of motion.   Neurological: He is alert and oriented to person, place, and time.   Skin: Skin is warm and dry.   Psychiatric: He has a normal mood and affect. His behavior is normal.   Nursing note and vitals reviewed.    Echo 1/7/18  Normal left ventricular systolic function.  Left ventricular ejection fraction is visually estimated to be 65%.  Tissue Doppler imaging suggests abnormal diastolic function.  Normal right ventricular systolic function.  Known mitral valve repair with mean transvalvular gradient of 9 mmHg at   a heart rate of 69 BPM.  Known mechanical aortic valve.  Transvalvular gradients are - Peak: 56 mmHg, Mean: 33 mmHg.  Moderate tricuspid regurgitation.  Right ventricular systolic pressure is estimated to be 65 mmHg.  Compared to the images of the prior study done 9/22/16, the measured   transaortic gradients are increased and the estimated right ventricular   systolic pressure is increased, previously normal.    Assessment:     1. Paroxysmal atrial fibrillation (CMS-HCC)     2. History of mechanical aortic valve replacement     3. Presence of permanent cardiac pacemaker     4. Chronic anticoagulation     5. Non-rheumatic mitral valve stenosis         Medical Decision Making:  Today's Assessment / Status / Plan:   1. PAF  -cont warfarin    2. AVR with Mechanical valve  -cont warfarin  -cionsider echo to reassess gradient in future    3. PPM  -settings adjusted during recent ED visit  -pt to contact office if he feels PPM not working appropriately    Collaborating MD is Dr. Lozano

## 2018-02-28 ENCOUNTER — OFFICE VISIT (OUTPATIENT)
Dept: MEDICAL GROUP | Facility: PHYSICIAN GROUP | Age: 48
End: 2018-02-28
Payer: COMMERCIAL

## 2018-02-28 VITALS
BODY MASS INDEX: 36.07 KG/M2 | TEMPERATURE: 98.1 F | HEIGHT: 68 IN | SYSTOLIC BLOOD PRESSURE: 138 MMHG | WEIGHT: 238 LBS | RESPIRATION RATE: 18 BRPM | DIASTOLIC BLOOD PRESSURE: 88 MMHG | OXYGEN SATURATION: 98 % | HEART RATE: 111 BPM

## 2018-02-28 DIAGNOSIS — F33.0 MILD EPISODE OF RECURRENT MAJOR DEPRESSIVE DISORDER (HCC): ICD-10-CM

## 2018-02-28 DIAGNOSIS — E78.2 MIXED HYPERLIPIDEMIA: ICD-10-CM

## 2018-02-28 PROCEDURE — 99214 OFFICE O/P EST MOD 30 MIN: CPT | Performed by: PHYSICIAN ASSISTANT

## 2018-02-28 RX ORDER — DULOXETIN HYDROCHLORIDE 20 MG/1
20 CAPSULE, DELAYED RELEASE ORAL DAILY
Qty: 30 CAP | Refills: 2 | Status: SHIPPED | OUTPATIENT
Start: 2018-02-28 | End: 2018-06-06 | Stop reason: SDUPTHER

## 2018-02-28 RX ORDER — WARFARIN SODIUM 2.5 MG/1
2.5 TABLET ORAL PRN
COMMUNITY
Start: 2018-01-23

## 2018-02-28 ASSESSMENT — PAIN SCALES - GENERAL: PAINLEVEL: NO PAIN

## 2018-03-01 NOTE — ASSESSMENT & PLAN NOTE
Patient was prescribed Lexapro 10 mg tab once nightly during last appointment on 1/26/18. States since being on medication he has been experiencing dry mouth and memory loss. Patient is requesting to discontinue medication and start Cymbalta. States in the past he has been prescribed Cymbalta and had successful results when taking medication. States he's tried Zoloft in the past with no success. States he is still feeling depressed, has no interest in activities, and is still experiencing decreased libido. Patient denies homicidal or suicidal ideation.

## 2018-03-01 NOTE — ASSESSMENT & PLAN NOTE
Discussed lipid profile lab work from 1/24/18 with patient. Results are as follows:  Cholesterol,Tot 250   100 - 199 mg/dL Final   Triglycerides 192   0 - 149 mg/dL Final   HDL 26   >=40 mg/dL Final      <100 mg/dL Final   Patient was prescribed Crestor 10 mg tab every night by cardiology on 2/2218. State he is compliant with medication and denies expected side effects or complications medication.

## 2018-03-02 NOTE — PROGRESS NOTES
Chief Complaint   Patient presents with   • Follow-Up     5 week follow up, lab review       HISTORY OF PRESENT ILLNESS: Brandon Winters is an established 47 y.o. male here to discuss the evaluation and management of:    Mixed hyperlipidemia  Discussed lipid profile lab work from 1/24/18 with patient. Results are as follows:  Cholesterol,Tot 250   100 - 199 mg/dL Final   Triglycerides 192   0 - 149 mg/dL Final   HDL 26   >=40 mg/dL Final      <100 mg/dL Final   Patient was prescribed Crestor 10 mg tab every night by cardiology on 2/2218. State he is compliant with medication and denies expected side effects or complications medication.          Mild episode of recurrent major depressive disorder (CMS-HCC)  Patient was prescribed Lexapro 10 mg tab once nightly during last appointment on 1/26/18. States since being on medication he has been experiencing dry mouth and memory loss. Patient is requesting to discontinue medication and start Cymbalta. States in the past he has been prescribed Cymbalta and had successful results when taking medication. States he's tried Zoloft in the past with no success. States he is still feeling depressed, has no interest in activities, and is still experiencing decreased libido. Patient denies homicidal or suicidal ideation.        Patient Active Problem List    Diagnosis Date Noted   • Non-rheumatic mitral valve stenosis 10/11/2016     Priority: Medium   • Paroxysmal atrial fibrillation (CMS-[facility]) 09/16/2015     Priority: Medium   • History of mechanical aortic valve replacement      Priority: Medium   • Hyperlipoproteinemia      Priority: Medium   • Presence of permanent cardiac pacemaker      Priority: Medium   • Chronic anticoagulation      Priority: Medium   • History of TIA (transient ischemic attack)      Priority: Medium   • TRIP on CPAP      Priority: Low   • Hypertension      Priority: Low   • Mixed hyperlipidemia 02/28/2018   • History of laparoscopic  cholecystectomy 2018   • Obesity (BMI 30-39.9) 2018   • Mild episode of recurrent major depressive disorder (CMS-HCC) 2018   • Chronic fatigue 01/10/2017   • Obesity (BMI 35.0-39.9 without comorbidity) 10/14/2016   • Dyspnea on exertion 10/13/2016   • Ventral hernia without obstruction or gangrene 10/13/2016       Allergies:Lactose    Current Outpatient Prescriptions   Medication Sig Dispense Refill   • DULoxetine (CYMBALTA) 20 MG Cap DR Particles Take 1 Cap by mouth every day. 30 Cap 2   • warfarin (COUMADIN) 2.5 MG Tab Take 2.5 mg by mouth as needed.     • rosuvastatin (CRESTOR) 10 MG Tab Take 1 Tab by mouth every evening. 30 Tab 11   • escitalopram (LEXAPRO) 10 MG Tab Take 10 mg by mouth every bedtime.     • warfarin (COUMADIN) 5 MG Tab Take 5 mg by mouth every bedtime.     • naproxen (ALEVE) 220 MG tablet Take 440 mg by mouth PRN.       No current facility-administered medications for this visit.        Social History   Substance Use Topics   • Smoking status: Never Smoker   • Smokeless tobacco: Never Used   • Alcohol use No       Family Status   Relation Status   • Father Alive   • Mother Alive   • Sister Alive   • Maternal Grandmother    • Maternal Grandfather    • Paternal Grandmother    • Paternal Grandfather    • Paternal Uncle    • Daughter Alive     Family History   Problem Relation Age of Onset   • Other Father      Hernia   • Heart Disease Father      Ascending Aortic Aneurysm Repair   • No Known Problems Mother    • No Known Problems Sister    • Heart Disease Maternal Grandmother      Open Heart Surgery   • Dementia Maternal Grandmother    • Heart Disease Paternal Grandmother      Open Heart Surgery (CABG)   • Alzheimer's Disease Paternal Grandmother    • Heart Disease Paternal Grandfather      ?Aortic Aneurysm   • Cancer Paternal Uncle      Prostate Cancer   • No Known Problems Daughter        ROS:  Review of Systems   Constitutional: Negative for  "fever, chills, weight loss and malaise/fatigue.   HENT: Negative for ear pain, nosebleeds, congestion, sore throat and neck pain.    Eyes: Negative for blurred vision.   Respiratory: Negative for cough, sputum production, shortness of breath and wheezing.    Cardiovascular: Negative for chest pain, palpitations, orthopnea and leg swelling.   Gastrointestinal: Negative for heartburn, nausea, vomiting and abdominal pain.   Genitourinary: Negative for dysuria, urgency and frequency. Positive for decreased libido.  Musculoskeletal: Negative for myalgias, back pain and joint pain.   Skin: Negative for rash and itching.   Neurological: Negative for dizziness, tingling, tremors, sensory change, focal weakness and headaches.   Endo/Heme/Allergies: Does not bruise/bleed easily.   Psychiatric/Behavioral: Negative for suicidal ideas and memory loss.  The patient is not nervous/anxious and does not have insomnia.  Positive for depression.  All other systems reviewed and are negative except as in HPI.    Exam: Blood pressure 138/88, pulse (!) 111, temperature 36.7 °C (98.1 °F), resp. rate 18, height 1.727 m (5' 8\"), weight 108 kg (238 lb), SpO2 98 %. Body mass index is 36.19 kg/m².  General: Normal appearing. No distress.  HEENT: Normocephalic. Eyes conjunctiva clear lids without ptosis, pupils equal and reactive to light accommodation, ears normal shape and contour, canals are clear bilaterally, tympanic membranes are benign, nasal mucosa benign, oropharynx is without erythema, edema or exudates.   Neck: Supple without JVD or bruit. Thyroid is not enlarged.  Pulmonary: Clear to ausculation.  Normal effort. No rales, ronchi, or wheezing.  Cardiovascular: Regular rate and rhythm without murmur. Carotid and radial pulses are intact and equal bilaterally. Positive for mechanical heart sounds.  Abdomen: Soft, nontender, nondistended. Normal bowel sounds. Liver and spleen are not palpable. Negative for CVA tenderness with " palpation.  Neurologic: Grossly nonfocal.  Cranial nerves are normal. LE DTR's normal and symmetric.  Lymph: No cervical, supraclavicular or axillary lymph nodes are palpable  Skin: Warm and dry.  No rashes or suspicious skin lesions.  Musculoskeletal: Normal gait. No extremity cyanosis, clubbing, or edema.  Psych: Normal mood and affect. Alert and oriented x3. Judgment and insight is normal.    Medical decision-making and discussion:  1. Mixed hyperlipidemia   Continue Crestor as prescribed. Continue to monitor.  Advised patient to follow up with cardiology as indicated.    2. Mild episode of recurrent major depressive disorder (CMS-HCC)  During today's appointment Lexapro 10 mg once daily has been discontinued due to patient expressing side effects. Patient has been prescribed Cymbalta 20 mg Take one By mouth every day. Discussed common side effects and adverse reactions of medication with patient. Patient will follow up in 6 weeks for med eval.  Denies any suicidal or homicidal ideation. Emphasized importance of healthy diet and exercise. Discussed that should the patient have any symptoms they should call suicide prevention hotline or report to the emergency room immediately.    - DULoxetine (CYMBALTA) 20 MG Cap DR Particles; Take 1 Cap by mouth every day.  Dispense: 30 Cap; Refill: 2      Patient underwent a cholecystectomy on 01/09/18. States he feels an overall good health.    Please note that this dictation was created using voice recognition software. I have made every reasonable attempt to correct obvious errors, but I expect that there are errors of grammar and possibly content that I did not discover before finalizing the note.        Return in about 6 weeks (around 4/11/2018).

## 2018-03-05 ENCOUNTER — TELEPHONE (OUTPATIENT)
Dept: VASCULAR LAB | Facility: MEDICAL CENTER | Age: 48
End: 2018-03-05

## 2018-03-05 NOTE — TELEPHONE ENCOUNTER
[facility]    Left a voicemail to remind pt to obtain next INR.     Rashi Shook, PharmD

## 2018-03-08 ENCOUNTER — HOSPITAL ENCOUNTER (OUTPATIENT)
Dept: LAB | Facility: MEDICAL CENTER | Age: 48
End: 2018-03-08
Attending: NURSE PRACTITIONER
Payer: COMMERCIAL

## 2018-03-08 DIAGNOSIS — Z95.2 HISTORY OF MECHANICAL AORTIC VALVE REPLACEMENT: ICD-10-CM

## 2018-03-08 LAB
INR PPP: 2.21 (ref 0.87–1.13)
PROTHROMBIN TIME: 24.2 SEC (ref 12–14.6)

## 2018-03-08 PROCEDURE — 36415 COLL VENOUS BLD VENIPUNCTURE: CPT

## 2018-03-08 PROCEDURE — 85610 PROTHROMBIN TIME: CPT

## 2018-03-09 ENCOUNTER — ANTICOAGULATION MONITORING (OUTPATIENT)
Dept: VASCULAR LAB | Facility: MEDICAL CENTER | Age: 48
End: 2018-03-09

## 2018-03-09 DIAGNOSIS — Z79.01 CHRONIC ANTICOAGULATION: ICD-10-CM

## 2018-03-09 DIAGNOSIS — I48.0 PAROXYSMAL ATRIAL FIBRILLATION (HCC): ICD-10-CM

## 2018-03-09 DIAGNOSIS — Z95.2 HISTORY OF MECHANICAL AORTIC VALVE REPLACEMENT: ICD-10-CM

## 2018-03-09 NOTE — PROGRESS NOTES
Anticoagulation Summary  As of 3/9/2018    INR goal:   2.5-3.5   TTR:   49.1 % (2.4 y)   Today's INR:   2.21! (3/8/2018)   Maintenance plan:   7.5 mg (5 mg x 1.5) on Fri; 5 mg (5 mg x 1) all other days   Weekly total:   37.5 mg   Plan last modified:   Kareem Zavala, PharmD (3/9/2018)   Next INR check:   3/23/2018   Target end date:   Indefinite    Indications    Paroxysmal atrial fibrillation (CMS-HCC) [I48.0]  Chronic anticoagulation [Z79.01]  History of mechanical aortic valve replacement [Z95.2]             Anticoagulation Episode Summary     INR check location:   [facility]    Preferred lab:       Send INR reminders to:       Comments:   [facility]      Anticoagulation Care Providers     Provider Role Specialty Phone number    Brandon Chawla M.D. Referring Cardiology [phone]    [facility] Responsible  [phone]    Rashi Shook, PharmD Responsible          Anticoagulation Patient Findings  Patient Findings     Positives:   Emergency department visit    Negatives:   Signs/symptoms of thrombosis, Signs/symptoms of bleeding, Laboratory test error suspected, Change in health, Change in alcohol use, Change in activity, Upcoming invasive procedure, Upcoming dental procedure, Missed doses, Extra doses, Change in medications, Change in diet/appetite, Hospital admission, Bruising, Other complaints        Spoke with patient today regarding subtherapeutic INR of 2.21.  Patient denies any signs/symptoms of bruising or bleeding or any changes in diet and medications.  Instructed patient to call clinic with any questions or concerns.  Patient has been taking lower weekly warfarin regimen than documented since discharge following cholecystectomy around the first of the year.  Declines lovenox, understands risks.  Instructed patient to increase weekly warfarin regimen as detailed above.  Follow up in 2 weeks (patient preference), to reduce risk of adverse events related to this high risk  medication,  Warfarin.    Kareem Zavala, PharmD

## 2018-04-12 ENCOUNTER — TELEPHONE (OUTPATIENT)
Dept: VASCULAR LAB | Facility: MEDICAL CENTER | Age: 48
End: 2018-04-12

## 2018-04-12 NOTE — TELEPHONE ENCOUNTER
[facility]    Spoke with emergency contact, she will inform pt to obtain next INR ASAP.    Rashi Shook, PharmD

## 2018-04-25 ENCOUNTER — APPOINTMENT (OUTPATIENT)
Dept: MEDICAL GROUP | Facility: PHYSICIAN GROUP | Age: 48
End: 2018-04-25
Payer: COMMERCIAL

## 2018-05-04 ENCOUNTER — TELEPHONE (OUTPATIENT)
Dept: VASCULAR LAB | Facility: MEDICAL CENTER | Age: 48
End: 2018-05-04

## 2018-05-04 NOTE — TELEPHONE ENCOUNTER
[facility]    Unable to leave  at this time, will continue to try contacting pt.    Rashi Shook, GovindD

## 2018-05-22 ENCOUNTER — TELEPHONE (OUTPATIENT)
Dept: VASCULAR LAB | Facility: MEDICAL CENTER | Age: 48
End: 2018-05-22

## 2018-05-22 NOTE — TELEPHONE ENCOUNTER
[facility]    Unable to leave VM with pt.  Left VM with emergency contact.  Sent letter of compliance.    Rashi Shook, PharmD

## 2018-06-06 DIAGNOSIS — F33.0 MILD EPISODE OF RECURRENT MAJOR DEPRESSIVE DISORDER (HCC): ICD-10-CM

## 2018-06-06 RX ORDER — DULOXETIN HYDROCHLORIDE 20 MG/1
20 CAPSULE, DELAYED RELEASE ORAL DAILY
Qty: 30 CAP | Refills: 1 | Status: SHIPPED | OUTPATIENT
Start: 2018-06-06 | End: 2018-06-12 | Stop reason: SDUPTHER

## 2018-06-12 DIAGNOSIS — F33.0 MILD EPISODE OF RECURRENT MAJOR DEPRESSIVE DISORDER (HCC): ICD-10-CM

## 2018-06-12 RX ORDER — DULOXETIN HYDROCHLORIDE 20 MG/1
20 CAPSULE, DELAYED RELEASE ORAL DAILY
Qty: 30 CAP | Refills: 2 | Status: SHIPPED | OUTPATIENT
Start: 2018-06-12

## 2018-06-20 ENCOUNTER — TELEPHONE (OUTPATIENT)
Dept: VASCULAR LAB | Facility: MEDICAL CENTER | Age: 48
End: 2018-06-20

## 2018-07-19 ENCOUNTER — TELEPHONE (OUTPATIENT)
Dept: VASCULAR LAB | Facility: MEDICAL CENTER | Age: 48
End: 2018-07-19

## 2018-07-19 NOTE — TELEPHONE ENCOUNTER
[facility]    Left VM with pt and emergency contact.  Unable to reach pt over 3 months with several phone calls and including a letter of compliance.  Will suggest pt is discharged from [facility].     Rashi Shook, PharmD

## 2018-07-20 ENCOUNTER — ANTICOAGULATION MONITORING (OUTPATIENT)
Dept: VASCULAR LAB | Facility: MEDICAL CENTER | Age: 48
End: 2018-07-20

## 2018-07-20 DIAGNOSIS — Z95.2 HISTORY OF MECHANICAL AORTIC VALVE REPLACEMENT: ICD-10-CM

## 2018-07-20 DIAGNOSIS — I48.0 PAROXYSMAL ATRIAL FIBRILLATION (HCC): ICD-10-CM

## 2018-07-20 DIAGNOSIS — Z79.01 CHRONIC ANTICOAGULATION: ICD-10-CM

## 2018-07-20 NOTE — PROGRESS NOTES
[facility]    Unable to reach pt, discharged from our clinic.    Rashi Shook, PharmD  [facility]

## 2018-11-15 ENCOUNTER — TELEPHONE (OUTPATIENT)
Dept: VASCULAR LAB | Facility: MEDICAL CENTER | Age: 48
End: 2018-11-15

## 2018-11-15 DIAGNOSIS — Z95.2 HISTORY OF MECHANICAL AORTIC VALVE REPLACEMENT: ICD-10-CM

## 2019-01-17 ENCOUNTER — TELEPHONE (OUTPATIENT)
Dept: VASCULAR LAB | Facility: MEDICAL CENTER | Age: 49
End: 2019-01-17

## 2019-01-17 NOTE — TELEPHONE ENCOUNTER
Called pt to remind that the  echo  is due for surveillance. Scheduling office and our office phone numbers provided. GURDEEP Paulino.

## 2019-01-31 ENCOUNTER — TELEPHONE (OUTPATIENT)
Dept: VASCULAR LAB | Facility: MEDICAL CENTER | Age: 49
End: 2019-01-31

## 2019-02-14 ENCOUNTER — TELEPHONE (OUTPATIENT)
Dept: VASCULAR LAB | Facility: MEDICAL CENTER | Age: 49
End: 2019-02-14

## 2019-02-21 ENCOUNTER — TELEPHONE (OUTPATIENT)
Dept: VASCULAR LAB | Facility: MEDICAL CENTER | Age: 49
End: 2019-02-21

## 2019-03-07 ENCOUNTER — TELEPHONE (OUTPATIENT)
Dept: VASCULAR LAB | Facility: MEDICAL CENTER | Age: 49
End: 2019-03-07

## 2019-03-07 NOTE — TELEPHONE ENCOUNTER
Called pt to remind that the  echo  is due for surveillance. Scheduling office and our office phone numbers provided. GURDEEP Paulino.  Pt called back he has [place] out of the [place] and is being managed by [place] [facility] and [facility]. .marcos

## 2019-08-26 NOTE — DIETARY
NUTRITION SERVICES - Poor PO PTA - Pt is a 46 yo male who comes in with an admitting Dx of Acute cholecystitis. H/o Stroke, HTN, and Hyperlipoproteinemia. BMI = 38.08.    Pertinent Labs: Calcium 8.2 (L, adjusted = 8.7 WNL), ALT 51 (H), TBili 7.0 (H), INR 1.67 (H), PTT 19.2 (H)    Pertinent Meds: Tamiflu, Zosyn, Pericolace, (PRN: Roxicodone, Dilaudid, Miralax, MOM, Dulcolax)    Fluids: None noted in MAR    GI: last BM on 1/7    Skin: no skin breakdown noted    Diet order: Clear liquid    Weight: 250# (113.6kg)    The pt was seen today to interview for poor PO PTA per the nutrition admit screening. The pt confirms not eating at his baseline for several weeks PTA d/t abdominal pain and feelings of early satiety with eating. The pt c/o feeling increasingly tired and weak the last few days. When asked about wt loss during the last few weeks, the pt denies and states that he actually gained wt. Discussed possibility of fluid gains vs gains in body mass since pt has had sub-optimal PO nutrition for several weeks. Per MD note, pt with trace LE edema present on exam. Pt is now on clear liquid diet, no PO records on file yet. Per general surgery notes, plan is for pt to have cholecystectomy tomorrow. Discussed diet order with the pt and what to expect as diet is advanced. All questions answered.    Plan/Rec:   1) Advance diet if/when medically feasible  2) If pt is to remain on clear liquid diet, consider adding Boost Breeze orange flavor with meals (250kcal, 9g protein each)  3) Fluids per MD discretion    RD monitoring.   - - -

## 2021-06-22 ENCOUNTER — TELEPHONE (OUTPATIENT)
Dept: SLEEP MEDICINE | Facility: MEDICAL CENTER | Age: 51
End: 2021-06-22

## 2021-06-22 NOTE — TELEPHONE ENCOUNTER
[facility] whom is treating patient is requesting records to be sent to them at F [phone]

## (undated) DEVICE — SUTURE 0 VICRYL PLUS CT-2 - 27 INCH (36/BX)

## (undated) DEVICE — TUBE CONNECTING SUCTION - CLEAR PLASTIC STERILE 72 IN (50EA/CA)

## (undated) DEVICE — MASK ANESTHESIA ADULT  - (100/CA)

## (undated) DEVICE — SUCTION INSTRUMENT YANKAUER BULBOUS TIP W/O VENT (50EA/CA)

## (undated) DEVICE — ELECTRODE DUAL RETURN W/ CORD - (50/PK)

## (undated) DEVICE — TROCAR Z THREAD11MM OPTICAL - NON BLADED(6/BX)

## (undated) DEVICE — TUBING INSUFFLATION - (10/BX)

## (undated) DEVICE — GOWN WARMING STANDARD FLEX - (30/CA)

## (undated) DEVICE — GLOVE BIOGEL PI ORTHO SZ 8 PF LF (40PR/BX)

## (undated) DEVICE — TROCAR 5X100 BLADED Z-THREAD - KII (6/BX)

## (undated) DEVICE — HUMID-VENT HEAT AND MOISTURE EXCHANGE- (50/BX)

## (undated) DEVICE — SUTURE GENERAL

## (undated) DEVICE — GLOVE, LITE (PAIR)

## (undated) DEVICE — SUTURE 4-0 MONOCRYL PLUS PS-2 - 27 INCH (36/BX)

## (undated) DEVICE — Device

## (undated) DEVICE — NEPTUNE 4 PORT MANIFOLD - (20/PK)

## (undated) DEVICE — DERMABOND ADVANCED - (12EA/BX)

## (undated) DEVICE — CHLORAPREP 26 ML APPLICATOR - ORANGE TINT(25/CA)

## (undated) DEVICE — CLIP MED LG INTNL HRZN TI ESCP - (20/BX)

## (undated) DEVICE — BAG, SPONGE COUNT 50600

## (undated) DEVICE — ELECTRODE 5MM LHK LAPSCP STERILE DISP- MEGADYNE  (5/CA)

## (undated) DEVICE — KIT ROOM DECONTAMINATION

## (undated) DEVICE — PROTECTOR ULNA NERVE - (36PR/CA)

## (undated) DEVICE — SET SUCTION/IRRIGATION WITH DISPOSABLE TIP (6/CA )PART #0250-070-520 IS A SUB

## (undated) DEVICE — ELECTRODE 850 FOAM ADHESIVE - HYDROGEL RADIOTRNSPRNT (50/PK)

## (undated) DEVICE — CANISTER SUCTION RIGID RED 1500CC (40EA/CA)

## (undated) DEVICE — DRESSING TRANSPARENT FILM TEGADERM 2.375 X 2.75"  (100EA/BX)"

## (undated) DEVICE — LACTATED RINGERS INJ 1000 ML - (14EA/CA 60CA/PF)

## (undated) DEVICE — HEAD HOLDER JUNIOR/ADULT

## (undated) DEVICE — SENSOR SPO2 NEO LNCS ADHESIVE (20/BX) SEE USER NOTES

## (undated) DEVICE — TUBE ET REINFORCED 7.0 - (5EA/BX)

## (undated) DEVICE — SODIUM CHL IRRIGATION 0.9% 1000ML (12EA/CA)

## (undated) DEVICE — CANNULA W/SEAL 5X100 Z-THRE - ADED KII (12/BX)

## (undated) DEVICE — WATER IRRIGATION STERILE 1000ML (12EA/CA)

## (undated) DEVICE — KIT ANESTHESIA W/CIRCUIT & 3/LT BAG W/FILTER (20EA/CA)